# Patient Record
Sex: FEMALE | Race: BLACK OR AFRICAN AMERICAN | NOT HISPANIC OR LATINO | Employment: OTHER | URBAN - METROPOLITAN AREA
[De-identification: names, ages, dates, MRNs, and addresses within clinical notes are randomized per-mention and may not be internally consistent; named-entity substitution may affect disease eponyms.]

---

## 2017-04-26 PROBLEM — G30.1 LATE ONSET ALZHEIMER'S DISEASE WITHOUT BEHAVIORAL DISTURBANCE: Status: ACTIVE | Noted: 2017-04-26

## 2017-04-26 PROBLEM — F02.80 LATE ONSET ALZHEIMER'S DISEASE WITHOUT BEHAVIORAL DISTURBANCE: Status: ACTIVE | Noted: 2017-04-26

## 2017-04-26 PROBLEM — I12.9 HYPERTENSIVE KIDNEY DISEASE WITH CHRONIC KIDNEY DISEASE STAGE III: Status: ACTIVE | Noted: 2017-04-26

## 2017-04-26 PROBLEM — E66.09 NON MORBID OBESITY DUE TO EXCESS CALORIES: Status: ACTIVE | Noted: 2017-04-26

## 2017-04-26 PROBLEM — N18.30 HYPERTENSIVE KIDNEY DISEASE WITH CHRONIC KIDNEY DISEASE STAGE III: Status: ACTIVE | Noted: 2017-04-26

## 2017-10-09 PROBLEM — E11.69 DM TYPE 2 WITH DIABETIC MIXED HYPERLIPIDEMIA: Status: ACTIVE | Noted: 2017-10-09

## 2017-10-09 PROBLEM — E78.2 DM TYPE 2 WITH DIABETIC MIXED HYPERLIPIDEMIA: Status: ACTIVE | Noted: 2017-10-09

## 2018-03-19 PROBLEM — N18.30 TYPE 2 DIABETES MELLITUS WITH STAGE 3 CHRONIC KIDNEY DISEASE, WITH LONG-TERM CURRENT USE OF INSULIN: Status: ACTIVE | Noted: 2018-03-19

## 2018-03-19 PROBLEM — I10 HTN, GOAL BELOW 140/90: Status: ACTIVE | Noted: 2018-03-19

## 2018-03-19 PROBLEM — Z79.4 TYPE 2 DIABETES MELLITUS WITH STAGE 3 CHRONIC KIDNEY DISEASE, WITH LONG-TERM CURRENT USE OF INSULIN: Status: ACTIVE | Noted: 2018-03-19

## 2018-03-19 PROBLEM — E11.22 TYPE 2 DIABETES MELLITUS WITH STAGE 3 CHRONIC KIDNEY DISEASE, WITH LONG-TERM CURRENT USE OF INSULIN: Status: ACTIVE | Noted: 2018-03-19

## 2018-04-09 PROBLEM — H61.21 IMPACTED CERUMEN OF RIGHT EAR: Status: ACTIVE | Noted: 2018-04-09

## 2018-04-09 PROBLEM — H92.02 LEFT EAR PAIN: Status: ACTIVE | Noted: 2018-04-09

## 2018-04-09 PROBLEM — H91.92: Status: ACTIVE | Noted: 2018-04-09

## 2018-04-09 PROBLEM — H60.502 ACUTE OTITIS EXTERNA OF LEFT EAR: Status: ACTIVE | Noted: 2018-04-09

## 2018-09-26 PROBLEM — Z23 NEEDS FLU SHOT: Status: ACTIVE | Noted: 2018-09-26

## 2019-01-17 PROBLEM — G40.909 SEIZURE DISORDER: Status: ACTIVE | Noted: 2019-01-17

## 2019-02-21 PROBLEM — H91.93 BILATERAL HEARING LOSS: Status: ACTIVE | Noted: 2019-02-21

## 2019-03-13 PROBLEM — E66.09 NON MORBID OBESITY DUE TO EXCESS CALORIES: Status: RESOLVED | Noted: 2017-04-26 | Resolved: 2019-03-13

## 2023-04-05 ENCOUNTER — OFFICE VISIT (OUTPATIENT)
Dept: FAMILY MEDICINE | Facility: CLINIC | Age: 88
End: 2023-04-05
Payer: MEDICARE

## 2023-04-05 VITALS
TEMPERATURE: 99 F | HEART RATE: 58 BPM | BODY MASS INDEX: 33.63 KG/M2 | HEIGHT: 60 IN | SYSTOLIC BLOOD PRESSURE: 134 MMHG | WEIGHT: 171.31 LBS | DIASTOLIC BLOOD PRESSURE: 60 MMHG | OXYGEN SATURATION: 97 %

## 2023-04-05 DIAGNOSIS — G40.909 SEIZURE DISORDER: ICD-10-CM

## 2023-04-05 DIAGNOSIS — N18.30 TYPE 2 DIABETES MELLITUS WITH STAGE 3 CHRONIC KIDNEY DISEASE, WITH LONG-TERM CURRENT USE OF INSULIN: ICD-10-CM

## 2023-04-05 DIAGNOSIS — F01.50 VASCULAR DEMENTIA WITHOUT BEHAVIORAL DISTURBANCE: ICD-10-CM

## 2023-04-05 DIAGNOSIS — I10 ESSENTIAL HYPERTENSION, BENIGN: ICD-10-CM

## 2023-04-05 DIAGNOSIS — E11.22 TYPE 2 DIABETES MELLITUS WITH STAGE 3 CHRONIC KIDNEY DISEASE, WITH LONG-TERM CURRENT USE OF INSULIN: ICD-10-CM

## 2023-04-05 DIAGNOSIS — I10 HTN, GOAL BELOW 140/90: ICD-10-CM

## 2023-04-05 DIAGNOSIS — Z79.4 TYPE 2 DIABETES MELLITUS WITH STAGE 3 CHRONIC KIDNEY DISEASE, WITH LONG-TERM CURRENT USE OF INSULIN: ICD-10-CM

## 2023-04-05 PROCEDURE — 99999 PR PBB SHADOW E&M-NEW PATIENT-LVL III: ICD-10-PCS | Mod: PBBFAC,,, | Performed by: FAMILY MEDICINE

## 2023-04-05 PROCEDURE — 99205 PR OFFICE/OUTPT VISIT, NEW, LEVL V, 60-74 MIN: ICD-10-PCS | Mod: S$PBB,,, | Performed by: FAMILY MEDICINE

## 2023-04-05 PROCEDURE — 99205 OFFICE O/P NEW HI 60 MIN: CPT | Mod: S$PBB,,, | Performed by: FAMILY MEDICINE

## 2023-04-05 PROCEDURE — 99203 OFFICE O/P NEW LOW 30 MIN: CPT | Mod: PBBFAC,PO | Performed by: FAMILY MEDICINE

## 2023-04-05 PROCEDURE — 99999 PR PBB SHADOW E&M-NEW PATIENT-LVL III: CPT | Mod: PBBFAC,,, | Performed by: FAMILY MEDICINE

## 2023-04-05 RX ORDER — AMLODIPINE BESYLATE 10 MG/1
10 TABLET ORAL DAILY
Qty: 90 TABLET | Refills: 3 | Status: SHIPPED | OUTPATIENT
Start: 2023-04-05

## 2023-04-05 RX ORDER — DONEPEZIL HYDROCHLORIDE 5 MG/1
5 TABLET, FILM COATED ORAL DAILY
Qty: 90 TABLET | Refills: 3 | Status: SHIPPED | OUTPATIENT
Start: 2023-04-05

## 2023-04-05 RX ORDER — RIVAROXABAN 2.5 MG/1
2.5 TABLET, FILM COATED ORAL DAILY
Qty: 30 TABLET | Refills: 0 | Status: SHIPPED | OUTPATIENT
Start: 2023-04-05 | End: 2023-05-04

## 2023-04-05 RX ORDER — PRAVASTATIN SODIUM 40 MG/1
40 TABLET ORAL
COMMUNITY
Start: 2023-03-24 | End: 2023-04-05 | Stop reason: SDUPTHER

## 2023-04-05 RX ORDER — INSULIN LISPRO 100 [IU]/ML
INJECTION, SUSPENSION SUBCUTANEOUS
Qty: 30 ML | Refills: 6 | OUTPATIENT
Start: 2023-04-05 | End: 2023-08-07

## 2023-04-05 RX ORDER — CARBAMAZEPINE 200 MG/1
200 CAPSULE, EXTENDED RELEASE ORAL 2 TIMES DAILY
Qty: 180 CAPSULE | Refills: 4 | Status: SHIPPED | OUTPATIENT
Start: 2023-04-05

## 2023-04-05 RX ORDER — CARVEDILOL 3.12 MG/1
3.12 TABLET ORAL 2 TIMES DAILY
Qty: 180 TABLET | Refills: 3 | Status: SHIPPED | OUTPATIENT
Start: 2023-04-05 | End: 2024-04-04

## 2023-04-05 RX ORDER — VALSARTAN AND HYDROCHLOROTHIAZIDE 320; 25 MG/1; MG/1
1 TABLET, FILM COATED ORAL DAILY
Qty: 90 TABLET | Refills: 3 | OUTPATIENT
Start: 2023-04-05 | End: 2023-08-07

## 2023-04-05 RX ORDER — PEN NEEDLE, DIABETIC 32 GX 1/4"
NEEDLE, DISPOSABLE MISCELLANEOUS 2 TIMES DAILY
COMMUNITY
Start: 2023-02-05

## 2023-04-05 RX ORDER — PRAVASTATIN SODIUM 40 MG/1
40 TABLET ORAL NIGHTLY
Qty: 90 TABLET | Refills: 3 | Status: SHIPPED | OUTPATIENT
Start: 2023-04-05

## 2023-04-05 NOTE — PROGRESS NOTES
HISTORY OF PRESENT ILLNESS:  Jossie Morejon is a 89 y.o. female who presents to the clinic today for Establish Care  .       Is a new patient to us with complex medical care.  She is 89 years old and she was away and the state for her last 12 months getting care.  Since that time she has death of her younger start her which has really changed her overall ability to care of herself over last 6 months.  Her current caretaker who is mainly her granddaughter but has help from the rest of family states that over the last 6 months she has had a significant decline.  She is way more foot withdrawn she is more irritable and her memory loss is even worse.  After reviewing the chart it seems like she has vascular type dementia and seizure disorder.  She was taken off the Aricept were not sure why but she feels like she was doing much better with her memory on this.  To the best of her knowledge and from review of records we can find any reason where the Aricept was discontinued secondary to harmful reasons.  Now that she is here permanently they are looking to get her established and in care and they were concerned that she does not like to go outside for care.  They are open to all possible avenues of caring for their loved one.    Patient Active Problem List   Diagnosis    Vitamin D deficiency    Dyslipidemia    History of breast cancer    Diabetes mellitus with renal manifestations, controlled    Prerenal azotemia    Chronic kidney disease, stage III (moderate)    BMI 32.0-32.9,adult    Hypertensive kidney disease with chronic kidney disease stage III    Late onset Alzheimer's disease without behavioral disturbance    DM type 2 with diabetic mixed hyperlipidemia    Type 2 diabetes mellitus with stage 3 chronic kidney disease, with long-term current use of insulin    HTN, goal below 140/90    Hearing difficulty, left    Left ear pain    Impacted cerumen of right ear    Acute otitis externa of left ear    Needs flu shot     "Seizure disorder    Bilateral hearing loss           CARE TEAM:  Patient Care Team:  Sammy Woody MD as PCP - General (Family Medicine)         ROS  Per HPI  Memory issues noted      PHYSICAL EXAM:   /60   Pulse (!) 58   Temp 98.9 °F (37.2 °C) (Oral)   Ht 5' (1.524 m)   Wt 77.7 kg (171 lb 4.8 oz)   SpO2 97%   BMI 33.45 kg/m²   BP Readings from Last 5 Encounters:   04/05/23 134/60   03/13/19 (!) 155/72   02/21/19 (!) 163/75   01/17/19 (!) 161/83   09/26/18 136/80     Wt Readings from Last 5 Encounters:   04/05/23 77.7 kg (171 lb 4.8 oz)   03/13/19 78.2 kg (172 lb 6.4 oz)   02/21/19 77.4 kg (170 lb 9.6 oz)   01/17/19 79.4 kg (175 lb)   09/26/18 79.8 kg (176 lb)             She is alert.  Hearing is compromised  Dentition is poor.  Hygiene is good.  Abdominal obesity noted  Arthritic change in the joints of hands/elbow, knees.  Wide based gait noted.  Hard to keep her attention.  Obvious memory issues and hard for her to participate in standard conversation.      Seek old records  Reviewed epic records.     Medication List with Changes/Refills   New Medications    RIVAROXABAN (XARELTO) 2.5 MG TAB    Take 1 tablet (2.5 mg total) by mouth once daily.   Current Medications    BLOOD SUGAR DIAGNOSTIC STRP    1 strip by Misc.(Non-Drug; Combo Route) route 3 (three) times daily.    BLOOD-GLUCOSE METER MISC    1 Device by Misc.(Non-Drug; Combo Route) route 3 (three) times daily.    ERGOCALCIFEROL (VITAMIN D2) 50,000 UNIT CAP    Take 1 capsule (50,000 Units total) by mouth every 7 days.    INSULIN SYRINGE-NEEDLE U-100 (INS SYRINGE/NEEDLE 0.5 ML 27 G) 1/2 ML 27 GAUGE X 1/2" SYRG    1 each by Misc.(Non-Drug; Combo Route) route 2 (two) times daily.    UNIFINE PENTIPS 32 GAUGE X 1/4" NDLE    2 (two) times daily.   Changed and/or Refilled Medications    Modified Medication Previous Medication    AMLODIPINE (NORVASC) 10 MG TABLET amLODIPine (NORVASC) 10 MG tablet       Take 1 tablet (10 mg total) by mouth once daily.    " Take 1 tablet (10 mg total) by mouth once daily.    CARBAMAZEPINE (CARBATROL) 200 MG CM12 carBAMazepine (CARBATROL) 200 MG CM12       Take 1 capsule (200 mg total) by mouth 2 (two) times daily.    Take 1 capsule (200 mg total) by mouth 2 (two) times daily.    CARVEDILOL (COREG) 3.125 MG TABLET carvedilol (COREG) 3.125 MG tablet       Take 1 tablet (3.125 mg total) by mouth 2 (two) times daily.    Take 1 tablet (3.125 mg total) by mouth 2 (two) times daily.    DONEPEZIL (ARICEPT) 5 MG TABLET donepezil (ARICEPT) 5 MG tablet       Take 1 tablet (5 mg total) by mouth once daily.    TAKE ONE TABLET BY MOUTH EVERY DAY    HUMALOG MIX 75-25,U-100,INSULN 100 UNIT/ML (75-25) SUSP HUMALOG MIX 75-25,U-100,INSULN 100 unit/mL (75-25) Susp       30 units QAM, 15 units QPM daily    30 units QAM, 15 units QPM daily    PRAVASTATIN (PRAVACHOL) 40 MG TABLET pravastatin (PRAVACHOL) 40 MG tablet       Take 1 tablet (40 mg total) by mouth every evening.    Take 40 mg by mouth.    VALSARTAN-HYDROCHLOROTHIAZIDE (DIOVAN-HCT) 320-25 MG PER TABLET valsartan-hydrochlorothiazide (DIOVAN-HCT) 320-25 mg per tablet       Take 1 tablet by mouth once daily.    Take 1 tablet by mouth once daily.   Discontinued Medications    ATORVASTATIN (LIPITOR) 10 MG TABLET    Take 1 tablet (10 mg total) by mouth once daily.         ASSESSMENT AND PLAN:    Problem List Items Addressed This Visit       Type 2 diabetes mellitus with stage 3 chronic kidney disease, with long-term current use of insulin    Relevant Medications    HUMALOG MIX 75-25,U-100,INSULN 100 unit/mL (75-25) Susp    pravastatin (PRAVACHOL) 40 MG tablet    HTN, goal below 140/90    Relevant Medications    valsartan-hydrochlorothiazide (DIOVAN-HCT) 320-25 mg per tablet    carvediloL (COREG) 3.125 MG tablet    Seizure disorder    Relevant Medications    carBAMazepine (CARBATROL) 200 MG CM12     Other Visit Diagnoses       Essential hypertension        Relevant Medications    amLODIPine (NORVASC) 10  MG tablet    Vascular dementia without behavioral disturbance        Relevant Medications    donepeziL (ARICEPT) 5 MG tablet    rivaroxaban (XARELTO) 2.5 mg Tab          Potential medication side effects were discussed with the patient; let me know if any occur.      We had a prolonged discussion about these complex clinical issues and went over the various important aspects to consider. All questions were answered.    Spoke with granddaughter who is the caregiver at length about history and current concerns 25 minutes.    Reviewed prior chart from Dr. Ignacio for 20 minutes.    Seek the old records and coordinate from her out of state care over last 12 months, 15 minutes.    Coordinate care, set up family meeting for possible palliative home care, refills and charting for an additional 20 minutes.  Future Appointments   Date Time Provider Department Center   5/9/2023  4:00 PM Sammy Woody MD [facility] [place]     1 month and go over the directives and possible palliative care  No follow-ups on file. or sooner as needed.

## 2023-04-26 ENCOUNTER — HOSPITAL ENCOUNTER (EMERGENCY)
Facility: HOSPITAL | Age: 88
Discharge: HOME | End: 2023-04-26
Attending: EMERGENCY MEDICINE
Payer: MEDICARE

## 2023-04-26 VITALS
DIASTOLIC BLOOD PRESSURE: 90 MMHG | OXYGEN SATURATION: 98 % | HEART RATE: 75 BPM | WEIGHT: 165 LBS | BODY MASS INDEX: 27.49 KG/M2 | TEMPERATURE: 99 F | HEIGHT: 65 IN | RESPIRATION RATE: 15 BRPM | SYSTOLIC BLOOD PRESSURE: 180 MMHG

## 2023-04-26 DIAGNOSIS — H61.23 BILATERAL IMPACTED CERUMEN: ICD-10-CM

## 2023-04-26 DIAGNOSIS — R51.9 ACUTE NONINTRACTABLE HEADACHE, UNSPECIFIED HEADACHE TYPE: ICD-10-CM

## 2023-04-26 DIAGNOSIS — S16.1XXA NECK MUSCLE STRAIN, INITIAL ENCOUNTER: Primary | ICD-10-CM

## 2023-04-26 LAB — POCT GLUCOSE: 147 MG/DL (ref 70–110)

## 2023-04-26 PROCEDURE — 82962 GLUCOSE BLOOD TEST: CPT

## 2023-04-26 PROCEDURE — 25000003 PHARM REV CODE 250

## 2023-04-26 PROCEDURE — 69209 REMOVE IMPACTED EAR WAX UNI: CPT | Mod: 50

## 2023-04-26 PROCEDURE — 99284 EMERGENCY DEPT VISIT MOD MDM: CPT | Mod: 25

## 2023-04-26 RX ORDER — ACETAMINOPHEN 500 MG
500 TABLET ORAL EVERY 6 HOURS PRN
Qty: 20 TABLET | Refills: 0 | Status: SHIPPED | OUTPATIENT
Start: 2023-04-26 | End: 2023-12-17

## 2023-04-26 RX ORDER — LIDOCAINE 50 MG/G
1 PATCH TOPICAL DAILY
Qty: 15 PATCH | Refills: 0 | Status: SHIPPED | OUTPATIENT
Start: 2023-04-26

## 2023-04-26 RX ORDER — DICLOFENAC SODIUM 10 MG/G
2 GEL TOPICAL 4 TIMES DAILY PRN
Qty: 50 G | Refills: 0 | Status: SHIPPED | OUTPATIENT
Start: 2023-04-26

## 2023-04-26 RX ORDER — TIZANIDINE 2 MG/1
4 TABLET ORAL EVERY 6 HOURS PRN
Qty: 20 TABLET | Refills: 0 | OUTPATIENT
Start: 2023-04-26 | End: 2023-08-07

## 2023-04-26 RX ADMIN — CARBAMIDE PEROXIDE 6.5% 5 DROP: 6.5 LIQUID AURICULAR (OTIC) at 01:04

## 2023-04-26 NOTE — ED TRIAGE NOTES
Pt presents to ED via POV with daughter at bedside with c/o LEFT sided neck pain. States took something to help her sleep last night and when she woke up this morning, she felt like she could not move. She called for her granddaughter who helped sit her up in bed. Since, she had has neck pain., especially with movement. Pt also reports LEFT ear pain and fullness x several days. No tx used.

## 2023-04-26 NOTE — DISCHARGE INSTRUCTIONS

## 2023-04-26 NOTE — Clinical Note
Jossie Ornelas accompanied their mother to the emergency department on 4/26/2023. They may return to work on 04/27/2023.      If you have any questions or concerns, please don't hesitate to call.      Alayna Holdsworth, PA-C

## 2023-04-26 NOTE — ED PROVIDER NOTES
Encounter Date: 4/26/2023    SCRIBE #1 NOTE: ITrena, am scribing for, and in the presence of,  Alayna Holdsworth, PA-C. I have scribed the following portions of the note - Other sections scribed: HPI and ROS.     History     Chief Complaint   Patient presents with    Neck Pain    Headache     Pt c/o headache and neck pain x 1 week. Pt's daughter states pt has been c/o left ear fullness and may require cleaning. Pt states she took tylenol 2 night ago which put her to sleep. Pt denies cp, sob, n/v/d, AMS, numbness/tingling.      Jossie Morejon is a 89 y.o. female, with a past medical history of alzheimer's, DM, HLD, HTN, stroke, and seizures, who presents to the ED with intermittent left-sided neck pain and headache that began 1 week ago but exacerbated this morning. Patient states the pain was so bad that she could hardly get up. Patient has an associated symptom of frontalis headache that is intermittent and currently a 0/10. Additional history obtained form daughter states the patient has been complaining of left ear pain.  Patient's daughter states that she is a history of cerumen impaction and usually has to get it removed.  Patient endorse tylenol pm with mild relief. No other exacerbating or alleviating factors. Patient denies fever, chest pain, nausea, emesis, or other associated symptoms. Daughter denies the patient experiencing recent trauma or sick contacts. No known allergies.  Per daughter patient is at mental baseline.      The history is provided by the patient and a relative. No  was used.   Review of patient's allergies indicates:  No Known Allergies  Past Medical History:   Diagnosis Date    Alzheimer disease     Breast cancer     CKD (chronic kidney disease)     Diabetes mellitus type II     Dyslipidemia     Dyslipidemia     Essential hypertension, benign     History of CVA (cerebrovascular accident)     Hypercholesteremia     Microalbuminuria     Mild anemia      Seizure disorder      No past surgical history on file.  Family History   Problem Relation Age of Onset    Diabetes Mother      Social History     Tobacco Use    Smoking status: Never    Smokeless tobacco: Never   Substance Use Topics    Alcohol use: No    Drug use: No     Review of Systems   Constitutional:  Negative for chills, diaphoresis and fever.   HENT:  Positive for ear pain (left). Negative for congestion, rhinorrhea and sore throat.    Eyes:  Negative for visual disturbance.   Respiratory:  Negative for cough and shortness of breath.    Cardiovascular:  Negative for chest pain.   Gastrointestinal:  Negative for abdominal pain, constipation, diarrhea, nausea and vomiting.   Genitourinary:  Negative for decreased urine volume, dysuria, frequency and urgency.   Musculoskeletal:  Positive for neck pain (left).   Skin:  Negative for color change, rash and wound.   Neurological:  Positive for headaches (frontalis). Negative for dizziness, syncope, speech difficulty, weakness, light-headedness and numbness.   Psychiatric/Behavioral:  Negative for behavioral problems and confusion.      Physical Exam     Initial Vitals [04/26/23 1209]   BP Pulse Resp Temp SpO2   (!) 179/74 76 18 99.2 °F (37.3 °C) 99 %      MAP       --         Physical Exam    Nursing note and vitals reviewed.  Constitutional: She appears well-developed and well-nourished. She is not diaphoretic. She is active. She does not appear ill. No distress.   HENT:   Head: Normocephalic and atraumatic.   Right Ear: External ear normal.   Left Ear: External ear normal.   Nose: Nose normal.   Bilateral cerumen impaction   Eyes: Conjunctivae, EOM and lids are normal. Pupils are equal, round, and reactive to light. Right eye exhibits no discharge. Left eye exhibits no discharge. Right eye exhibits normal extraocular motion and no nystagmus. Left eye exhibits normal extraocular motion and no nystagmus.   Neck: Phonation normal. Neck supple.   Normal range of  motion.   Full passive range of motion without pain.     Cardiovascular:  Normal rate and regular rhythm.           Pulmonary/Chest: Effort normal and breath sounds normal. No respiratory distress.   Abdominal: She exhibits no distension.   Musculoskeletal:         General: Normal range of motion.      Cervical back: Full passive range of motion without pain, normal range of motion and neck supple. No edema, erythema or rigidity. No spinous process tenderness or muscular tenderness. Normal range of motion.     Neurological: She is alert and oriented to person, place, and time. She has normal strength. No cranial nerve deficit or sensory deficit. GCS eye subscore is 4. GCS verbal subscore is 5. GCS motor subscore is 6.   Skin: Skin is dry. Capillary refill takes less than 2 seconds.       ED Course   Procedures  Labs Reviewed   POCT GLUCOSE - Abnormal; Notable for the following components:       Result Value    POCT Glucose 147 (*)     All other components within normal limits   POCT GLUCOSE MONITORING CONTINUOUS          Imaging Results              CT Head Without Contrast (Final result)  Result time 04/26/23 13:38:56      Final result by Tamara Romeo MD (04/26/23 13:38:56)                   Impression:      1. No acute intracranial abnormality.  2. No fracture or subluxation of the cervical spine.      Electronically signed by: Tamara Romeo  Date:    04/26/2023  Time:    13:38               Narrative:    EXAMINATION:  CT HEAD WITHOUT CONTRAST; CT CERVICAL SPINE WITHOUT CONTRAST    CLINICAL HISTORY:  Headache, new or worsening (Age >= 50y);; Neck trauma (Age >= 65y);    TECHNIQUE:  Low dose axial images were obtained through the head and cervical spine.  Coronal and sagittal reformations were also performed. Contrast was not administered.    COMPARISON:  None.    FINDINGS:  Head:    Blood: No acute intracranial hemorrhage.    Parenchyma: No definite loss of gray-white differentiation to suggest acute or  subacute transcortical infarct. Remote left lacunar infarct    Ventricles/Extra-axial spaces: No abnormal extra-axial fluid collection. Basal cisterns are patent.    Vessels: The internal carotid arteries are calcified bilaterally.    Orbits: Unremarkable.    Scalp: Unremarkable.    Skull: There are no depressed skull fractures or destructive bone lesions.    Sinuses and mastoids: Polypoid circumferential mucosal thickening in the right maxillary sinus.    Other findings: None    Cervical spine:    Fractures: No acute fractures    Alignment: There is no significant vertebral subluxation  Atlanto-axial and atlanto-occipital joints: Atlanto-axial and atlanto-occipital intervals are not widened.  Facet joints: There is no traumatic facet joint widening.  Vertebral bodies: Normal  Discs: Multilevel degenerative disc osteophyte complexes are present.  There is also bilateral facet and ligamentum flavum hypertrophy.  Spinal canal and foraminal narrowing: Although CT does not optimally evaluate the soft tissue contents of the spinal canal and foramina, no critical stenosis is suggested.  Paraspinal soft tissues: Unremarkable.    Upper Lungs:Clear                                       CT Cervical Spine Without Contrast (Final result)  Result time 04/26/23 13:38:56      Final result by Tamara Romeo MD (04/26/23 13:38:56)                   Impression:      1. No acute intracranial abnormality.  2. No fracture or subluxation of the cervical spine.      Electronically signed by: Tamara Romeo  Date:    04/26/2023  Time:    13:38               Narrative:    EXAMINATION:  CT HEAD WITHOUT CONTRAST; CT CERVICAL SPINE WITHOUT CONTRAST    CLINICAL HISTORY:  Headache, new or worsening (Age >= 50y);; Neck trauma (Age >= 65y);    TECHNIQUE:  Low dose axial images were obtained through the head and cervical spine.  Coronal and sagittal reformations were also performed. Contrast was not  administered.    COMPARISON:  None.    FINDINGS:  Head:    Blood: No acute intracranial hemorrhage.    Parenchyma: No definite loss of gray-white differentiation to suggest acute or subacute transcortical infarct. Remote left lacunar infarct    Ventricles/Extra-axial spaces: No abnormal extra-axial fluid collection. Basal cisterns are patent.    Vessels: The internal carotid arteries are calcified bilaterally.    Orbits: Unremarkable.    Scalp: Unremarkable.    Skull: There are no depressed skull fractures or destructive bone lesions.    Sinuses and mastoids: Polypoid circumferential mucosal thickening in the right maxillary sinus.    Other findings: None    Cervical spine:    Fractures: No acute fractures    Alignment: There is no significant vertebral subluxation  Atlanto-axial and atlanto-occipital joints: Atlanto-axial and atlanto-occipital intervals are not widened.  Facet joints: There is no traumatic facet joint widening.  Vertebral bodies: Normal  Discs: Multilevel degenerative disc osteophyte complexes are present.  There is also bilateral facet and ligamentum flavum hypertrophy.  Spinal canal and foraminal narrowing: Although CT does not optimally evaluate the soft tissue contents of the spinal canal and foramina, no critical stenosis is suggested.  Paraspinal soft tissues: Unremarkable.    Upper Lungs:Clear                                       Medications   carbamide peroxide 6.5 % otic solution 5 drop (5 drops Left Ear Given 4/26/23 1328)   carbamide peroxide 6.5 % otic solution 5 drop (5 drops Right Ear Given 4/26/23 1328)     Medical Decision Making:   Initial Assessment:   89 y.o. female, with a past medical history of alzheimer's, DM, HLD, HTN, stroke, and seizures, who presents to the ED with intermittent left-sided neck pain and headache.  Patient's chart and medical history reviewed.  Differential Diagnosis:   SAH  Epidural hematoma  Subdural  hematoma  CVA  TIA  Fracture  Dislocation  Contusion  Sprain  Strain  Clinical Tests:   Lab Tests: Ordered and Reviewed  Radiological Study: Reviewed and Ordered  ED Management:  Patient's vitals reviewed.  She is afebrile, no respiratory distress, nontoxic-appearing in the ED. patient's neuro exam was normal.  Patient's physical exam is unremarkable besides bilateral cerumen impaction.  Patient denied pain medication.  Point of care glucose is 147.  Bilateral cerumen impaction performed by nurse, patient states she is feeling a lot better.  CT head and neck showed no acute intracranial abnormality. No fracture or subluxation of the cervical spine.  Discussed with patient started this could possibly be due to her sleeping wrong having a neck strain on left side.  Patient will be sent home with Tylenol, lidocaine patches, tizanidine, and Voltaren gel for symptomatic control.  Discussed with her that she must leave patch on for 12 hours then leave off for 12 hours, she verbalized understanding.  Instructed patient to rest, ice, and heat.  Patient will follow-up with her PCP. Patient agrees with this plan. Discussed with her strict return precautions, she verbalized understanding. Patient is stable for discharge.   \          Scribe Attestation:   Scribe #1: I performed the above scribed service and the documentation accurately describes the services I performed. I attest to the accuracy of the note.                   Clinical Impression:   Final diagnoses:  [S16.1XXA] Neck muscle strain, initial encounter - left (Primary)  [H61.23] Bilateral impacted cerumen  [R51.9] Acute nonintractable headache, unspecified headache type        ED Disposition Condition    Discharge Stable        I, Alayna Holdsworth,PA-C, personally performed the services described in this documentation. All medical record entries made by the scribe were at my direction and in my presence. I have reviewed the chart and agree that the record reflects  my personal performance and is accurate and complete.    ED Prescriptions       Medication Sig Dispense Start Date End Date Auth. Provider    acetaminophen (TYLENOL) 500 MG tablet Take 1 tablet (500 mg total) by mouth every 6 (six) hours as needed for Temperature greater than or Pain. 20 tablet 4/26/2023 -- Alayna Holdsworth, PA-C    LIDOcaine (LIDODERM) 5 % Place 1 patch onto the skin once daily. Remove & Discard patch within 12 hours then leave off for 12 hours 15 patch 4/26/2023 -- Alayna Holdsworth, PA-C    tiZANidine (ZANAFLEX) 2 MG tablet Take 2 tablets (4 mg total) by mouth every 6 (six) hours as needed (Pain). 20 tablet 4/26/2023 -- Alayna Holdsworth, PA-C    diclofenac sodium (VOLTAREN) 1 % Gel Apply 2 g topically 4 (four) times daily as needed (Pain). 50 g 4/26/2023 -- Alayna Holdsworth, PA-C          Follow-up Information       Follow up With Specialties Details Why Contact Info    Sammy Woody MD Family Medicine   [address] [facility]  [place] [id number]  [phone]               Alayna Holdsworth, PA-C  04/26/23 3773

## 2023-08-06 ENCOUNTER — HOSPITAL ENCOUNTER (OUTPATIENT)
Facility: HOSPITAL | Age: 88
Discharge: HOME | End: 2023-08-07
Attending: EMERGENCY MEDICINE | Admitting: HOSPITALIST
Payer: MEDICARE

## 2023-08-06 DIAGNOSIS — U07.1 COVID: Primary | ICD-10-CM

## 2023-08-06 DIAGNOSIS — R79.89 ELEVATED TROPONIN: ICD-10-CM

## 2023-08-06 DIAGNOSIS — R55 NEAR SYNCOPE: ICD-10-CM

## 2023-08-06 DIAGNOSIS — E16.2 HYPOGLYCEMIA: ICD-10-CM

## 2023-08-06 DIAGNOSIS — N17.9 AKI (ACUTE KIDNEY INJURY): ICD-10-CM

## 2023-08-06 PROBLEM — N18.30 ACUTE RENAL FAILURE SUPERIMPOSED ON STAGE 3 CHRONIC KIDNEY DISEASE: Status: ACTIVE | Noted: 2023-08-06

## 2023-08-06 LAB
ALBUMIN SERPL BCP-MCNC: 3.6 G/DL (ref 3.5–5.2)
ALP SERPL-CCNC: 145 U/L (ref 55–135)
ALT SERPL W/O P-5'-P-CCNC: 10 U/L (ref 10–44)
AMORPH CRY URNS QL MICRO: ABNORMAL
ANION GAP SERPL CALC-SCNC: 9 MMOL/L (ref 8–16)
AST SERPL-CCNC: 13 U/L (ref 10–40)
BACTERIA #/AREA URNS HPF: ABNORMAL /HPF
BASOPHILS # BLD AUTO: 0.01 K/UL (ref 0–0.2)
BASOPHILS NFR BLD: 0.2 % (ref 0–1.9)
BILIRUB SERPL-MCNC: 0.2 MG/DL (ref 0.1–1)
BILIRUB UR QL STRIP: NEGATIVE
BNP SERPL-MCNC: 50 PG/ML (ref 0–99)
BUN SERPL-MCNC: 40 MG/DL (ref 8–23)
CALCIUM SERPL-MCNC: 8.9 MG/DL (ref 8.7–10.5)
CARBAMAZEPINE SERPL-MCNC: 3.7 UG/ML (ref 4–12)
CHLORIDE SERPL-SCNC: 104 MMOL/L (ref 95–110)
CK SERPL-CCNC: 202 U/L (ref 20–180)
CLARITY UR: ABNORMAL
CO2 SERPL-SCNC: 23 MMOL/L (ref 23–29)
COLOR UR: ABNORMAL
CREAT SERPL-MCNC: 2.5 MG/DL (ref 0.5–1.4)
CTP QC/QA: YES
CTP QC/QA: YES
DIFFERENTIAL METHOD: ABNORMAL
EOSINOPHIL # BLD AUTO: 0.1 K/UL (ref 0–0.5)
EOSINOPHIL NFR BLD: 1.1 % (ref 0–8)
ERYTHROCYTE [DISTWIDTH] IN BLOOD BY AUTOMATED COUNT: 17.3 % (ref 11.5–14.5)
EST. GFR  (NO RACE VARIABLE): 18 ML/MIN/1.73 M^2
GLUCOSE SERPL-MCNC: 126 MG/DL (ref 70–110)
GLUCOSE UR QL STRIP: NEGATIVE
HCT VFR BLD AUTO: 28.5 % (ref 37–48.5)
HGB BLD-MCNC: 8.8 G/DL (ref 12–16)
HGB UR QL STRIP: ABNORMAL
HYALINE CASTS #/AREA URNS LPF: 0 /LPF
IMM GRANULOCYTES # BLD AUTO: 0.07 K/UL (ref 0–0.04)
IMM GRANULOCYTES NFR BLD AUTO: 1.1 % (ref 0–0.5)
KETONES UR QL STRIP: NEGATIVE
LACTATE SERPL-SCNC: 1.7 MMOL/L (ref 0.5–2.2)
LEUKOCYTE ESTERASE UR QL STRIP: ABNORMAL
LYMPHOCYTES # BLD AUTO: 1.1 K/UL (ref 1–4.8)
LYMPHOCYTES NFR BLD: 17.7 % (ref 18–48)
MAGNESIUM SERPL-MCNC: 2.4 MG/DL (ref 1.6–2.6)
MCH RBC QN AUTO: 24 PG (ref 27–31)
MCHC RBC AUTO-ENTMCNC: 30.9 G/DL (ref 32–36)
MCV RBC AUTO: 78 FL (ref 82–98)
MICROSCOPIC COMMENT: ABNORMAL
MONOCYTES # BLD AUTO: 0.6 K/UL (ref 0.3–1)
MONOCYTES NFR BLD: 9.2 % (ref 4–15)
NEUTROPHILS # BLD AUTO: 4.5 K/UL (ref 1.8–7.7)
NEUTROPHILS NFR BLD: 70.7 % (ref 38–73)
NITRITE UR QL STRIP: NEGATIVE
NRBC BLD-RTO: 0 /100 WBC
PH UR STRIP: 6 [PH] (ref 5–8)
PLATELET # BLD AUTO: 202 K/UL (ref 150–450)
PMV BLD AUTO: 9.8 FL (ref 9.2–12.9)
POC MOLECULAR INFLUENZA A AGN: NEGATIVE
POC MOLECULAR INFLUENZA B AGN: NEGATIVE
POCT GLUCOSE: 124 MG/DL (ref 70–110)
POCT GLUCOSE: 142 MG/DL (ref 70–110)
POCT GLUCOSE: 168 MG/DL (ref 70–110)
POCT GLUCOSE: 211 MG/DL (ref 70–110)
POCT GLUCOSE: 94 MG/DL (ref 70–110)
POTASSIUM SERPL-SCNC: 3.3 MMOL/L (ref 3.5–5.1)
PROT SERPL-MCNC: 7 G/DL (ref 6–8.4)
PROT UR QL STRIP: ABNORMAL
RBC # BLD AUTO: 3.66 M/UL (ref 4–5.4)
RBC #/AREA URNS HPF: 6 /HPF (ref 0–4)
SARS-COV-2 RDRP RESP QL NAA+PROBE: POSITIVE
SODIUM SERPL-SCNC: 136 MMOL/L (ref 136–145)
SP GR UR STRIP: 1.01 (ref 1–1.03)
SQUAMOUS #/AREA URNS HPF: 9 /HPF
TROPONIN I SERPL DL<=0.01 NG/ML-MCNC: 0.04 NG/ML (ref 0–0.03)
TROPONIN I SERPL DL<=0.01 NG/ML-MCNC: 0.08 NG/ML (ref 0–0.03)
TSH SERPL DL<=0.005 MIU/L-ACNC: 1.19 UIU/ML (ref 0.4–4)
URN SPEC COLLECT METH UR: ABNORMAL
UROBILINOGEN UR STRIP-ACNC: NEGATIVE EU/DL
WBC # BLD AUTO: 6.38 K/UL (ref 3.9–12.7)
WBC #/AREA URNS HPF: 4 /HPF (ref 0–5)

## 2023-08-06 PROCEDURE — 93005 ELECTROCARDIOGRAM TRACING: CPT

## 2023-08-06 PROCEDURE — 83735 ASSAY OF MAGNESIUM: CPT | Performed by: EMERGENCY MEDICINE

## 2023-08-06 PROCEDURE — 82962 GLUCOSE BLOOD TEST: CPT | Mod: 91

## 2023-08-06 PROCEDURE — 82550 ASSAY OF CK (CPK): CPT | Performed by: EMERGENCY MEDICINE

## 2023-08-06 PROCEDURE — 80053 COMPREHEN METABOLIC PANEL: CPT | Performed by: EMERGENCY MEDICINE

## 2023-08-06 PROCEDURE — 84484 ASSAY OF TROPONIN QUANT: CPT | Mod: 91 | Performed by: PHYSICIAN ASSISTANT

## 2023-08-06 PROCEDURE — 80156 ASSAY CARBAMAZEPINE TOTAL: CPT | Performed by: EMERGENCY MEDICINE

## 2023-08-06 PROCEDURE — 87635 SARS-COV-2 COVID-19 AMP PRB: CPT | Performed by: EMERGENCY MEDICINE

## 2023-08-06 PROCEDURE — 85025 COMPLETE CBC W/AUTO DIFF WBC: CPT | Performed by: EMERGENCY MEDICINE

## 2023-08-06 PROCEDURE — 93010 EKG 12-LEAD: ICD-10-PCS | Mod: ,,, | Performed by: INTERNAL MEDICINE

## 2023-08-06 PROCEDURE — 83880 ASSAY OF NATRIURETIC PEPTIDE: CPT | Performed by: EMERGENCY MEDICINE

## 2023-08-06 PROCEDURE — 81000 URINALYSIS NONAUTO W/SCOPE: CPT | Performed by: EMERGENCY MEDICINE

## 2023-08-06 PROCEDURE — 82962 GLUCOSE BLOOD TEST: CPT

## 2023-08-06 PROCEDURE — 93010 ELECTROCARDIOGRAM REPORT: CPT | Mod: ,,, | Performed by: INTERNAL MEDICINE

## 2023-08-06 PROCEDURE — 63600175 PHARM REV CODE 636 W HCPCS: Performed by: EMERGENCY MEDICINE

## 2023-08-06 PROCEDURE — 99285 EMERGENCY DEPT VISIT HI MDM: CPT | Mod: 25

## 2023-08-06 PROCEDURE — 83605 ASSAY OF LACTIC ACID: CPT | Performed by: EMERGENCY MEDICINE

## 2023-08-06 PROCEDURE — 96361 HYDRATE IV INFUSION ADD-ON: CPT

## 2023-08-06 PROCEDURE — 96374 THER/PROPH/DIAG INJ IV PUSH: CPT

## 2023-08-06 PROCEDURE — G0378 HOSPITAL OBSERVATION PER HR: HCPCS

## 2023-08-06 PROCEDURE — 84484 ASSAY OF TROPONIN QUANT: CPT | Performed by: EMERGENCY MEDICINE

## 2023-08-06 PROCEDURE — 83036 HEMOGLOBIN GLYCOSYLATED A1C: CPT | Performed by: PHYSICIAN ASSISTANT

## 2023-08-06 PROCEDURE — 25000003 PHARM REV CODE 250: Performed by: PHYSICIAN ASSISTANT

## 2023-08-06 PROCEDURE — 84443 ASSAY THYROID STIM HORMONE: CPT | Performed by: EMERGENCY MEDICINE

## 2023-08-06 RX ORDER — ASPIRIN 325 MG
325 TABLET ORAL
Status: DISPENSED | OUTPATIENT
Start: 2023-08-06 | End: 2023-08-07

## 2023-08-06 RX ORDER — AMLODIPINE BESYLATE 5 MG/1
10 TABLET ORAL DAILY
Status: DISCONTINUED | OUTPATIENT
Start: 2023-08-07 | End: 2023-08-07 | Stop reason: HOSPADM

## 2023-08-06 RX ORDER — GLUCAGON 1 MG
1 KIT INJECTION
Status: DISCONTINUED | OUTPATIENT
Start: 2023-08-06 | End: 2023-08-06

## 2023-08-06 RX ORDER — ASCORBIC ACID 500 MG
500 TABLET ORAL 2 TIMES DAILY
Status: DISCONTINUED | OUTPATIENT
Start: 2023-08-06 | End: 2023-08-07 | Stop reason: HOSPADM

## 2023-08-06 RX ORDER — IBUPROFEN 200 MG
24 TABLET ORAL
Status: DISCONTINUED | OUTPATIENT
Start: 2023-08-06 | End: 2023-08-06

## 2023-08-06 RX ORDER — PRAVASTATIN SODIUM 40 MG/1
40 TABLET ORAL NIGHTLY
Status: DISCONTINUED | OUTPATIENT
Start: 2023-08-06 | End: 2023-08-07 | Stop reason: HOSPADM

## 2023-08-06 RX ORDER — CARVEDILOL 3.12 MG/1
3.12 TABLET ORAL 2 TIMES DAILY
Status: DISCONTINUED | OUTPATIENT
Start: 2023-08-06 | End: 2023-08-07

## 2023-08-06 RX ORDER — SODIUM CHLORIDE 9 MG/ML
INJECTION, SOLUTION INTRAVENOUS CONTINUOUS
Status: DISCONTINUED | OUTPATIENT
Start: 2023-08-06 | End: 2023-08-07 | Stop reason: HOSPADM

## 2023-08-06 RX ORDER — GLUCAGON 1 MG
1 KIT INJECTION
Status: DISCONTINUED | OUTPATIENT
Start: 2023-08-06 | End: 2023-08-07 | Stop reason: HOSPADM

## 2023-08-06 RX ORDER — DONEPEZIL HYDROCHLORIDE 5 MG/1
5 TABLET, FILM COATED ORAL DAILY
Status: DISCONTINUED | OUTPATIENT
Start: 2023-08-07 | End: 2023-08-07 | Stop reason: HOSPADM

## 2023-08-06 RX ORDER — IBUPROFEN 200 MG
16 TABLET ORAL
Status: DISCONTINUED | OUTPATIENT
Start: 2023-08-06 | End: 2023-08-06

## 2023-08-06 RX ORDER — ACETAMINOPHEN 500 MG
500 TABLET ORAL EVERY 6 HOURS PRN
Status: DISCONTINUED | OUTPATIENT
Start: 2023-08-06 | End: 2023-08-07 | Stop reason: HOSPADM

## 2023-08-06 RX ORDER — DEXTROSE MONOHYDRATE AND SODIUM CHLORIDE 5; .9 G/100ML; G/100ML
INJECTION, SOLUTION INTRAVENOUS CONTINUOUS
Status: DISCONTINUED | OUTPATIENT
Start: 2023-08-06 | End: 2023-08-06

## 2023-08-06 RX ORDER — AMOXICILLIN 250 MG
1 CAPSULE ORAL DAILY PRN
Status: DISCONTINUED | OUTPATIENT
Start: 2023-08-06 | End: 2023-08-07 | Stop reason: HOSPADM

## 2023-08-06 RX ORDER — CARBAMAZEPINE 100 MG/1
200 TABLET, EXTENDED RELEASE ORAL 2 TIMES DAILY
Status: DISCONTINUED | OUTPATIENT
Start: 2023-08-07 | End: 2023-08-07 | Stop reason: HOSPADM

## 2023-08-06 RX ORDER — TALC
6 POWDER (GRAM) TOPICAL NIGHTLY PRN
Status: DISCONTINUED | OUTPATIENT
Start: 2023-08-06 | End: 2023-08-07 | Stop reason: HOSPADM

## 2023-08-06 RX ORDER — SODIUM CHLORIDE 0.9 % (FLUSH) 0.9 %
10 SYRINGE (ML) INJECTION
Status: DISCONTINUED | OUTPATIENT
Start: 2023-08-06 | End: 2023-08-07 | Stop reason: HOSPADM

## 2023-08-06 RX ORDER — IBUPROFEN 200 MG
16 TABLET ORAL
Status: DISCONTINUED | OUTPATIENT
Start: 2023-08-06 | End: 2023-08-07 | Stop reason: HOSPADM

## 2023-08-06 RX ORDER — IBUPROFEN 200 MG
24 TABLET ORAL
Status: DISCONTINUED | OUTPATIENT
Start: 2023-08-06 | End: 2023-08-07 | Stop reason: HOSPADM

## 2023-08-06 RX ADMIN — SODIUM CHLORIDE, POTASSIUM CHLORIDE, SODIUM LACTATE AND CALCIUM CHLORIDE 1000 ML: 600; 310; 30; 20 INJECTION, SOLUTION INTRAVENOUS at 04:08

## 2023-08-06 RX ADMIN — SODIUM CHLORIDE: 9 INJECTION, SOLUTION INTRAVENOUS at 09:08

## 2023-08-06 NOTE — ED PROVIDER NOTES
Encounter Date: 8/6/2023    SCRIBE #1 NOTE: I, ROBIN CRAWFORD, am scribing for, and in the presence of,  Denita Maria MD. I have scribed the following portions of the note - Other sections scribed: HPI, ROS, PE, MDM.       History     Chief Complaint   Patient presents with    near syncope     PT to EMS triage for near syncope. Per EMS, daughter states pt was having seizure like activity while walking from car to get inside after Urgent Care visit for +covid. PMHX of dementia and only oriented to self at baseline. EMS reports upon arrival pt was at baseline with a glucose of 48. 25g of Dextrose was given with EMS and 200 cc of D10, repeat cbg was 280. Upon arrival . VSSDaryl Morejon is a 89 y.o. female, with a PMHx of Alzheimer disease, breast cancer, CKD, DM type 2, dyslipidemia, essential HTN, Hx of CVA, hypercholesteremia, microalbuminuria, mild anemia, and seizure disorder, who presents to the ED via EMS with near syncope. Patient reports that she is not in any pain. Additional history obtained from independent historian, patient's granddaughter, who states that the patient was walking back from a vehicle into the house when she started having full body shaking and fell onto her mother. She further states that her mother put the patient down to the ground and her eyes rolled back and got stiff. She reports that the patient would talk or shake her head during the episode and could have had a seizure. She further reports that the patient was diagnosed with COVID-19 after coming into sick contact with family members in [place] the summer. She notes that the patient had been sleeping more than usual and eating less. No other exacerbating or alleviating factors. Denies any CP, dizziness, LOC, or other associated symptoms. Endorses compliance with daily medications. Denies any compliance with Xarelto. Patient denies any EtOH, tobacco, or illicit drug use. Patient has NKDA.     Additional history  obtained from independent historian, EMS personnel, who states that patient had a low BP of 90 systolic with CBG 48. They administered 25 g of Dextrose and 200 cc of D10 with patient's repeat . Upon arrival, patient had .    The history is provided by the patient, the EMS personnel and a relative. No  was used.     Review of patient's allergies indicates:  No Known Allergies  Past Medical History:   Diagnosis Date    Alzheimer disease     Breast cancer     CKD (chronic kidney disease)     Diabetes mellitus type II     Dyslipidemia     Dyslipidemia     Essential hypertension, benign     History of CVA (cerebrovascular accident)     Hypercholesteremia     Microalbuminuria     Mild anemia     Seizure disorder      No past surgical history on file.  Family History   Problem Relation Age of Onset    Diabetes Mother      Social History     Tobacco Use    Smoking status: Never    Smokeless tobacco: Never   Substance Use Topics    Alcohol use: No    Drug use: No     Review of Systems   Constitutional:  Positive for appetite change and fatigue. Negative for chills, diaphoresis and fever.   Eyes:  Negative for photophobia and visual disturbance.   Respiratory:  Negative for cough and shortness of breath.    Cardiovascular:  Negative for chest pain and leg swelling.   Gastrointestinal:  Negative for abdominal pain, blood in stool, constipation, diarrhea, nausea and vomiting.   Genitourinary:  Negative for dysuria, flank pain, frequency, hematuria and urgency.   Musculoskeletal:  Negative for neck pain and neck stiffness.   Skin:  Negative for rash and wound.   Neurological:  Positive for tremors and syncope. Negative for dizziness, weakness, light-headedness, numbness and headaches.   Psychiatric/Behavioral:  Negative for confusion and suicidal ideas.    All other systems reviewed and are negative.      Physical Exam     Initial Vitals [08/06/23 1524]   BP Pulse Resp Temp SpO2   (!) 111/56 82  20 99.5 °F (37.5 °C) 95 %      MAP       --         Physical Exam    Nursing note and vitals reviewed.  Constitutional: She appears well-developed and well-nourished. She is not diaphoretic. No distress.   History consistent with history of dementia.    HENT:   Head: Normocephalic and atraumatic.   Mouth/Throat: Oropharynx is clear and moist. No oropharyngeal exudate.   Slightly dry mucous membranes.    Eyes: Conjunctivae and EOM are normal. Pupils are equal, round, and reactive to light. Right eye exhibits no discharge. Left eye exhibits no discharge.   Neck: Neck supple. No JVD present.   Normal range of motion.  Cardiovascular:  Normal rate, regular rhythm, normal heart sounds and intact distal pulses.     Exam reveals no gallop and no friction rub.       No murmur heard.  Pulmonary/Chest: Breath sounds normal. No respiratory distress. She has no wheezes. She has no rhonchi. She has no rales.   Abdominal: Abdomen is soft. Bowel sounds are normal. She exhibits no distension. There is no abdominal tenderness. There is no rebound and no guarding.   Musculoskeletal:         General: No tenderness or edema.      Cervical back: Normal range of motion and neck supple.     Lymphadenopathy:     She has no cervical adenopathy.   Neurological: She is alert. She has normal strength. No cranial nerve deficit or sensory deficit. GCS score is 15. GCS eye subscore is 4. GCS verbal subscore is 5. GCS motor subscore is 6.   AAOx2.   Moves all extremities and carries on conversation. CN- II: PERRL; III/IV/VI: EOMI w/out evidence of nystagmus; V: no deficits appreciated to light touch bilateral face; VII: no facial weakness, no facial asymmetry. Eyebrow raise symmetric. Smile symmetric; IX/X: palate midline, and raises symmetrically; XI: shoulder shrug 5/5 bilaterally; XII: tongue is midline w/out asymmetry. Strength 4/5 to bilateral upper and lower extremities, sensation intact to light touch   Skin: Skin is warm and dry. Capillary  refill takes less than 2 seconds.   Psychiatric: She has a normal mood and affect. Thought content normal.         ED Course   Procedures  Labs Reviewed   CBC W/ AUTO DIFFERENTIAL - Abnormal; Notable for the following components:       Result Value    RBC 3.66 (*)     Hemoglobin 8.8 (*)     Hematocrit 28.5 (*)     MCV 78 (*)     MCH 24.0 (*)     MCHC 30.9 (*)     RDW 17.3 (*)     Immature Granulocytes 1.1 (*)     Immature Grans (Abs) 0.07 (*)     Lymph % 17.7 (*)     All other components within normal limits   COMPREHENSIVE METABOLIC PANEL - Abnormal; Notable for the following components:    Potassium 3.3 (*)     Glucose 126 (*)     BUN 40 (*)     Creatinine 2.5 (*)     Alkaline Phosphatase 145 (*)     eGFR 18 (*)     All other components within normal limits   TROPONIN I - Abnormal; Notable for the following components:    Troponin I 0.042 (*)     All other components within normal limits   URINALYSIS, REFLEX TO URINE CULTURE - Abnormal; Notable for the following components:    Color, UA Orange (*)     Appearance, UA Cloudy (*)     Protein, UA 1+ (*)     Occult Blood UA 3+ (*)     Leukocytes, UA 3+ (*)     All other components within normal limits    Narrative:     Specimen Source->Urine   CK - Abnormal; Notable for the following components:     (*)     All other components within normal limits   URINALYSIS MICROSCOPIC - Abnormal; Notable for the following components:    RBC, UA 6 (*)     Bacteria Many (*)     All other components within normal limits    Narrative:     Specimen Source->Urine   CARBAMAZEPINE LEVEL, TOTAL - Abnormal; Notable for the following components:    Carbamazepine Lvl 3.7 (*)     All other components within normal limits   SARS-COV-2 RDRP GENE - Abnormal; Notable for the following components:    POC Rapid COVID Positive (*)     All other components within normal limits   POCT GLUCOSE - Abnormal; Notable for the following components:    POCT Glucose 211 (*)     All other components  within normal limits   POCT GLUCOSE - Abnormal; Notable for the following components:    POCT Glucose 168 (*)     All other components within normal limits   POCT GLUCOSE - Abnormal; Notable for the following components:    POCT Glucose 142 (*)     All other components within normal limits   POCT GLUCOSE - Abnormal; Notable for the following components:    POCT Glucose 124 (*)     All other components within normal limits   B-TYPE NATRIURETIC PEPTIDE   MAGNESIUM   TSH   LACTIC ACID, PLASMA   CARBAMAZEPINE LEVEL, TOTAL   HEMOGLOBIN A1C   TROPONIN I   POCT INFLUENZA A/B MOLECULAR   POCT GLUCOSE MONITORING CONTINUOUS   POCT GLUCOSE MONITORING CONTINUOUS   POCT GLUCOSE MONITORING CONTINUOUS          Imaging Results              CT Head Without Contrast (Final result)  Result time 08/06/23 18:06:57      Final result by Kitty Ovalles MD (08/06/23 18:06:57)                   Impression:      No acute intracranial abnormality detected.  Cerebral atrophy and chronic small vessel ischemic changes.    Sinus disease.    Empty sella.      Electronically signed by: Kitty Ovalles  Date:    08/06/2023  Time:    18:06               Narrative:    EXAMINATION:  CT OF THE HEAD WITHOUT    CLINICAL HISTORY:  Seizure, new-onset, no history of trauma;    TECHNIQUE:  5 mm unenhanced axial images were obtained from the skull base to the vertex.    COMPARISON:  04/26/2023    FINDINGS:  Moderate cerebral atrophy and chronic small vessel ischemic changes are present.  There is no acute intracranial hemorrhage, territorial infarct or mass effect, or midline shift.  The sella is empty.  In the visualized paranasal sinuses, there is an air-fluid level seen in the left maxillary sinus.  There is moderate patchy opacification of bilateral mastoid air cells.  There is mucoperiosteal thickening seen in the left frontal sinus.  Mild mucoperiosteal thickening is seen in the visualized anterior aspects of sphenoid sinuses and the right maxillary  sinus.  Hyperostosis frontalis is present.                                       X-Ray Chest AP Portable (Final result)  Result time 08/06/23 16:15:46      Final result by Kishore Mendez MD (08/06/23 16:15:46)                   Impression:      No acute chest disease identified.      Electronically signed by: Kishore Mendez MD  Date:    08/06/2023  Time:    16:15               Narrative:    EXAMINATION:  XR CHEST AP PORTABLE    CLINICAL HISTORY:  Syncope and collapse    TECHNIQUE:  Single frontal view of the chest was performed.    COMPARISON:  08/15/2011.    FINDINGS:  The heart and mediastinal structures appear unremarkable.  Atherosclerotic calcification is present within the aortic arch.  Pulmonary vasculature appears to be within normal limits.  The lungs are free of focal consolidations.  There is no evidence for pneumothorax or large pleural effusions.  Bony structures are grossly intact.                                       Medications   potassium bicarbonate disintegrating tablet 20 mEq (20 mEq Oral Not Given 8/6/23 1715)   aspirin tablet 325 mg (325 mg Oral Not Given 8/6/23 1715)   sodium chloride 0.9% flush 10 mL (has no administration in time range)   glucose chewable tablet 16 g (has no administration in time range)   glucose chewable tablet 24 g (has no administration in time range)   dextrose 50% injection 12.5 g (has no administration in time range)   dextrose 50% injection 25 g (has no administration in time range)   glucagon (human recombinant) injection 1 mg (has no administration in time range)   ascorbic acid (vitamin C) tablet 500 mg (500 mg Oral Not Given 8/6/23 2100)   multivitamin tablet (has no administration in time range)   acetaminophen tablet 500 mg (has no administration in time range)   senna-docusate 8.6-50 mg per tablet 1 tablet (has no administration in time range)   melatonin tablet 6 mg (has no administration in time range)   amLODIPine tablet 10 mg (has no administration in  time range)   carBAMazepine 12 hr tablet 200 mg (has no administration in time range)   carvediloL tablet 3.125 mg (3.125 mg Oral Not Given 8/6/23 2200)   donepeziL tablet 5 mg (has no administration in time range)   pravastatin tablet 40 mg (40 mg Oral Not Given 8/6/23 2200)   0.9%  NaCl infusion ( Intravenous New Bag 8/6/23 2152)   lactated ringers bolus 1,000 mL (0 mLs Intravenous Stopped 8/6/23 1745)     Medical Decision Making:   History:   I obtained history from: someone other than patient and EMS provider.       <> Summary of History: Additional history is provided by independent historian: patient's granddaughter.    Old Medical Records: I decided to obtain old medical records.  Old Records Summarized: other records.  Independently Interpreted Test(s):   I have ordered and independently interpreted EKG Reading(s) - see summary below       <> Summary of EKG Reading(s): EKG independently interpreted by Denita Maria MD reads: See ED course.    Clinical Tests:   Lab Tests: Ordered and Reviewed  Radiological Study: Ordered and Reviewed  Medical Tests: Ordered and Reviewed  MDM  MDM:    89 y.o.female with PMHx as noted above presents with near syncope vs seizure, hypoglycemia, hypotension. Physical exam remarkable for well appearing female, conversing with ease, no peripheral edema noted, abdomen soft, nt/nd,  in no overt respiratory distress.  ED workup remarkable for CXR - unremarkable, Pt presentation consistent with COVID19 infection, with symptoms consistent as noted above.  At this time given patient's history, physical exam, and ED workup do not suspect acute PE, acute MI/ACS, PTX, CHF/COPD exacerbation, bacterial PNA, septic shock, acute respiratory failure, or any further malignant cause.      Patient near syncope vs seizure. History of seizure once in the past per family.    Per EMS patient started shaking and legs gave out on her but did not have LOC, consistent with more near syncope. Noted to be  hypoglycemia and hypotensive.     Family gave insulin this morning but patient with decreased appetite, likely contributing.     Improved with D10 given by EMS and food given in ED. BG remained stable.     BP improved with IVF.     Troponin elevated at 0.042, EKG as below.     Crea elevated at 2.5 (previous 1.25), BUN 40, likely 2/2 to dehydration. Patient has not provided urine sample yet in ED and refusing in and out cath.     Potassium 3.3, replaced.     Anemia lower to 8.8 from previous baseline. Family denies melena or BRBPR.     Covid positive, fall risk so not ambulated but SPO2 WNL at rest.     Case discussed with patient and family who agree with plan for monitoring and hydration.     Case discussed with Hero who agrees to place patient in observation for further evaluation and care.             Scribe Attestation:   Scribe #1: I performed the above scribed service and the documentation accurately describes the services I performed. I attest to the accuracy of the note.        ED Course as of 08/06/23 2258   Sun Aug 06, 2023   1658 EKG 12-lead  Initial EKG with background noise but normal sinus rhythm at 74.  Possible LVH.  Difficult to ascertain ST-elevation or depression given the noise however no clear ST elevation noted.  T-wave inversions in aVL and appears to be flattening in 1.  QTC is 457.    Repeat EKG obtained still with some back round noise however sinus rhythm with first-degree AV block at 69.  No ST elevation or significant depression noted.  T-wave flattening in 1 and T-wave inversions in aVL.  Normal axis.  QTC is 450.  [JT]      ED Course User Index  [JT] Denita Maria MD               Scribstephanie attestation: I, Denita Maria, personally performed the services described in this documentation. All medical record entries made by the scribe were at my direction and in my presence.  I have reviewed the chart and agree that the record reflects my personal performance and is accurate and complete.    Clinical Impression:   Final diagnoses:  [R55] Near syncope  [N17.9] CHRISTO (acute kidney injury)  [U07.1] COVID (Primary)  [R77.8] Elevated troponin        ED Disposition Condition    Observation Stable                Denita Maria MD  08/06/23 1033

## 2023-08-06 NOTE — Clinical Note
Diagnosis: CHRISTO (acute kidney injury) [[id number]]   Future Attending Provider: MENDOZA LANDIN [[id number]]   Admitting Provider:: MENDOZA LANDIN [[id number]]

## 2023-08-07 VITALS
RESPIRATION RATE: 21 BRPM | SYSTOLIC BLOOD PRESSURE: 148 MMHG | HEART RATE: 69 BPM | DIASTOLIC BLOOD PRESSURE: 63 MMHG | OXYGEN SATURATION: 98 % | BODY MASS INDEX: 27.49 KG/M2 | WEIGHT: 165 LBS | HEIGHT: 65 IN | TEMPERATURE: 99 F

## 2023-08-07 DIAGNOSIS — U07.1 COVID-19 VIRUS DETECTED: ICD-10-CM

## 2023-08-07 LAB
ALBUMIN SERPL BCP-MCNC: 3.4 G/DL (ref 3.5–5.2)
ALBUMIN SERPL BCP-MCNC: 3.5 G/DL (ref 3.5–5.2)
ALP SERPL-CCNC: 149 U/L (ref 55–135)
ALT SERPL W/O P-5'-P-CCNC: 10 U/L (ref 10–44)
ANION GAP SERPL CALC-SCNC: 11 MMOL/L (ref 8–16)
ANION GAP SERPL CALC-SCNC: 7 MMOL/L (ref 8–16)
AST SERPL-CCNC: 16 U/L (ref 10–40)
BASOPHILS # BLD AUTO: 0.01 K/UL (ref 0–0.2)
BASOPHILS NFR BLD: 0.2 % (ref 0–1.9)
BILIRUB SERPL-MCNC: 0.2 MG/DL (ref 0.1–1)
BUN SERPL-MCNC: 29 MG/DL (ref 8–23)
BUN SERPL-MCNC: 36 MG/DL (ref 8–23)
CALCIUM SERPL-MCNC: 8.7 MG/DL (ref 8.7–10.5)
CALCIUM SERPL-MCNC: 9.4 MG/DL (ref 8.7–10.5)
CHLORIDE SERPL-SCNC: 106 MMOL/L (ref 95–110)
CHLORIDE SERPL-SCNC: 108 MMOL/L (ref 95–110)
CO2 SERPL-SCNC: 21 MMOL/L (ref 23–29)
CO2 SERPL-SCNC: 23 MMOL/L (ref 23–29)
CREAT SERPL-MCNC: 1.7 MG/DL (ref 0.5–1.4)
CREAT SERPL-MCNC: 2 MG/DL (ref 0.5–1.4)
DIFFERENTIAL METHOD: ABNORMAL
EOSINOPHIL # BLD AUTO: 0.1 K/UL (ref 0–0.5)
EOSINOPHIL NFR BLD: 1.5 % (ref 0–8)
ERYTHROCYTE [DISTWIDTH] IN BLOOD BY AUTOMATED COUNT: 17.3 % (ref 11.5–14.5)
EST. GFR  (NO RACE VARIABLE): 23 ML/MIN/1.73 M^2
EST. GFR  (NO RACE VARIABLE): 28 ML/MIN/1.73 M^2
ESTIMATED AVG GLUCOSE: 120 MG/DL (ref 68–131)
GLUCOSE SERPL-MCNC: 110 MG/DL (ref 70–110)
GLUCOSE SERPL-MCNC: 83 MG/DL (ref 70–110)
HBA1C MFR BLD: 5.8 % (ref 4–5.6)
HCT VFR BLD AUTO: 29.6 % (ref 37–48.5)
HGB BLD-MCNC: 9 G/DL (ref 12–16)
IMM GRANULOCYTES # BLD AUTO: 0.06 K/UL (ref 0–0.04)
IMM GRANULOCYTES NFR BLD AUTO: 1.3 % (ref 0–0.5)
LYMPHOCYTES # BLD AUTO: 1.6 K/UL (ref 1–4.8)
LYMPHOCYTES NFR BLD: 34.8 % (ref 18–48)
MAGNESIUM SERPL-MCNC: 2.3 MG/DL (ref 1.6–2.6)
MCH RBC QN AUTO: 23.9 PG (ref 27–31)
MCHC RBC AUTO-ENTMCNC: 30.4 G/DL (ref 32–36)
MCV RBC AUTO: 79 FL (ref 82–98)
MONOCYTES # BLD AUTO: 0.5 K/UL (ref 0.3–1)
MONOCYTES NFR BLD: 11.4 % (ref 4–15)
NEUTROPHILS # BLD AUTO: 2.3 K/UL (ref 1.8–7.7)
NEUTROPHILS NFR BLD: 50.8 % (ref 38–73)
NRBC BLD-RTO: 0 /100 WBC
PHOSPHATE SERPL-MCNC: 2.9 MG/DL (ref 2.7–4.5)
PHOSPHATE SERPL-MCNC: 3.2 MG/DL (ref 2.7–4.5)
PLATELET # BLD AUTO: 210 K/UL (ref 150–450)
PMV BLD AUTO: 10.3 FL (ref 9.2–12.9)
POTASSIUM SERPL-SCNC: 3.6 MMOL/L (ref 3.5–5.1)
POTASSIUM SERPL-SCNC: 4.3 MMOL/L (ref 3.5–5.1)
PROT SERPL-MCNC: 6.9 G/DL (ref 6–8.4)
RBC # BLD AUTO: 3.76 M/UL (ref 4–5.4)
SODIUM SERPL-SCNC: 138 MMOL/L (ref 136–145)
SODIUM SERPL-SCNC: 138 MMOL/L (ref 136–145)
TROPONIN I SERPL DL<=0.01 NG/ML-MCNC: 0.05 NG/ML (ref 0–0.03)
WBC # BLD AUTO: 4.57 K/UL (ref 3.9–12.7)

## 2023-08-07 PROCEDURE — 84100 ASSAY OF PHOSPHORUS: CPT | Performed by: PHYSICIAN ASSISTANT

## 2023-08-07 PROCEDURE — 99205 PR OFFICE/OUTPT VISIT, NEW, LEVL V, 60-74 MIN: ICD-10-PCS | Mod: ,,, | Performed by: NURSE PRACTITIONER

## 2023-08-07 PROCEDURE — 25000003 PHARM REV CODE 250: Performed by: PHYSICIAN ASSISTANT

## 2023-08-07 PROCEDURE — 63600175 PHARM REV CODE 636 W HCPCS: Performed by: NURSE PRACTITIONER

## 2023-08-07 PROCEDURE — 80053 COMPREHEN METABOLIC PANEL: CPT | Performed by: PHYSICIAN ASSISTANT

## 2023-08-07 PROCEDURE — 84100 ASSAY OF PHOSPHORUS: CPT | Mod: 59 | Performed by: NURSE PRACTITIONER

## 2023-08-07 PROCEDURE — 96361 HYDRATE IV INFUSION ADD-ON: CPT

## 2023-08-07 PROCEDURE — G0378 HOSPITAL OBSERVATION PER HR: HCPCS

## 2023-08-07 PROCEDURE — 85025 COMPLETE CBC W/AUTO DIFF WBC: CPT | Performed by: PHYSICIAN ASSISTANT

## 2023-08-07 PROCEDURE — 83735 ASSAY OF MAGNESIUM: CPT | Performed by: PHYSICIAN ASSISTANT

## 2023-08-07 PROCEDURE — 84484 ASSAY OF TROPONIN QUANT: CPT | Performed by: PHYSICIAN ASSISTANT

## 2023-08-07 PROCEDURE — 99205 OFFICE O/P NEW HI 60 MIN: CPT | Mod: ,,, | Performed by: NURSE PRACTITIONER

## 2023-08-07 RX ORDER — MOLNUPIRAVIR 200 MG/1
CAPSULE ORAL
COMMUNITY
Start: 2023-08-06 | End: 2023-08-07

## 2023-08-07 RX ORDER — ASCORBIC ACID 500 MG
500 TABLET ORAL 2 TIMES DAILY
Qty: 30 TABLET | Refills: 3 | Status: SHIPPED | OUTPATIENT
Start: 2023-08-07

## 2023-08-07 RX ORDER — HYDRALAZINE HYDROCHLORIDE 20 MG/ML
5 INJECTION INTRAMUSCULAR; INTRAVENOUS EVERY 6 HOURS PRN
Status: DISCONTINUED | OUTPATIENT
Start: 2023-08-07 | End: 2023-08-07 | Stop reason: HOSPADM

## 2023-08-07 RX ADMIN — HYDRALAZINE HYDROCHLORIDE 5 MG: 20 INJECTION INTRAMUSCULAR; INTRAVENOUS at 03:08

## 2023-08-07 RX ADMIN — OXYCODONE HYDROCHLORIDE AND ACETAMINOPHEN 500 MG: 500 TABLET ORAL at 09:08

## 2023-08-07 RX ADMIN — THERA TABS 1 TABLET: TAB at 09:08

## 2023-08-07 RX ADMIN — AMLODIPINE BESYLATE 10 MG: 5 TABLET ORAL at 09:08

## 2023-08-07 RX ADMIN — CARBAMAZEPINE 200 MG: 100 TABLET, EXTENDED RELEASE ORAL at 09:08

## 2023-08-07 NOTE — ASSESSMENT & PLAN NOTE
Cr today of 2.5. baseline of 1.2 to 1.5. suspect related to decreased PO intake in setting of COVID.  Started on gentle IVF  Renal dose medications, avoid nephrotoxic drugs, monitor intake and output  Home valsartan/HCTZ held  Was also hypoglycemic on EMS arrival to 48 suspect related to decreased PO Intake, without hypoglycemia in ED      Patient with acute kidney injury/acute renal failure likely due to pre-renal azotemia due to dehydration CHRISTO is currently stable. Baseline creatinine 1.2-1.5 - Labs reviewed- Renal function/electrolytes with Estimated Creatinine Clearance: 15.1 mL/min (A) (based on SCr of 2.5 mg/dL (H)). according to latest data. Monitor urine output and serial BMP and adjust therapy as needed. Avoid nephrotoxins and renally dose meds for GFR listed above.

## 2023-08-07 NOTE — SUBJECTIVE & OBJECTIVE
"Past Medical History:   Diagnosis Date    Alzheimer disease     Breast cancer     CKD (chronic kidney disease)     Diabetes mellitus type II     Dyslipidemia     Dyslipidemia     Essential hypertension, benign     History of CVA (cerebrovascular accident)     Hypercholesteremia     Microalbuminuria     Mild anemia     Seizure disorder        No past surgical history on file.    Review of patient's allergies indicates:  No Known Allergies    No current facility-administered medications on file prior to encounter.     Current Outpatient Medications on File Prior to Encounter   Medication Sig    acetaminophen (TYLENOL) 500 MG tablet Take 1 tablet (500 mg total) by mouth every 6 (six) hours as needed for Temperature greater than or Pain.    amLODIPine (NORVASC) 10 MG tablet Take 1 tablet (10 mg total) by mouth once daily.    blood sugar diagnostic Strp 1 strip by Misc.(Non-Drug; Combo Route) route 3 (three) times daily.    blood-glucose meter Misc 1 Device by Misc.(Non-Drug; Combo Route) route 3 (three) times daily.    carBAMazepine (CARBATROL) 200 MG CM12 Take 1 capsule (200 mg total) by mouth 2 (two) times daily.    carvediloL (COREG) 3.125 MG tablet Take 1 tablet (3.125 mg total) by mouth 2 (two) times daily.    diclofenac sodium (VOLTAREN) 1 % Gel Apply 2 g topically 4 (four) times daily as needed (Pain).    donepeziL (ARICEPT) 5 MG tablet Take 1 tablet (5 mg total) by mouth once daily.    ergocalciferol (VITAMIN D2) 50,000 unit Cap Take 1 capsule (50,000 Units total) by mouth every 7 days.    HUMALOG MIX 75-25,U-100,INSULN 100 unit/mL (75-25) Susp 30 units QAM, 15 units QPM daily    insulin syringe-needle U-100 (INS SYRINGE/NEEDLE 0.5 ML 27 G) 1/2 mL 27 gauge x 1/2" Syrg 1 each by Misc.(Non-Drug; Combo Route) route 2 (two) times daily.    LIDOcaine (LIDODERM) 5 % Place 1 patch onto the skin once daily. Remove & Discard patch within 12 hours then leave off for 12 hours    pravastatin (PRAVACHOL) 40 MG tablet Take 1 " "tablet (40 mg total) by mouth every evening.    rivaroxaban (XARELTO) 2.5 mg Tab TAKE 1 TABLET(2.5 MG) BY MOUTH EVERY DAY    tiZANidine (ZANAFLEX) 2 MG tablet Take 2 tablets (4 mg total) by mouth every 6 (six) hours as needed (Pain).    UNIFINE PENTIPS 32 gauge x 1/4" Ndle 2 (two) times daily.    valsartan-hydrochlorothiazide (DIOVAN-HCT) 320-25 mg per tablet Take 1 tablet by mouth once daily.     Family History       Problem Relation (Age of Onset)    Diabetes Mother          Tobacco Use    Smoking status: Never    Smokeless tobacco: Never   Substance and Sexual Activity    Alcohol use: No    Drug use: No    Sexual activity: Never     Review of Systems   Unable to perform ROS: Dementia     Objective:     Vital Signs (Most Recent):  Temp: 99.5 °F (37.5 °C) (08/06/23 1524)  Pulse: 83 (08/06/23 1906)  Resp: 20 (08/06/23 1642)  BP: (!) 164/73 (08/06/23 1851)  SpO2: 98 % (08/06/23 1906) Vital Signs (24h Range):  Temp:  [99.5 °F (37.5 °C)] 99.5 °F (37.5 °C)  Pulse:  [71-83] 83  Resp:  [18-20] 20  SpO2:  [95 %-100 %] 98 %  BP: (111-164)/(56-73) 164/73     Weight: 74.8 kg (165 lb)  Body mass index is 27.46 kg/m².     Physical Exam  Vitals and nursing note reviewed.   Constitutional:       General: She is not in acute distress.     Appearance: She is well-developed. She is not diaphoretic.   HENT:      Head: Normocephalic and atraumatic.      Right Ear: External ear normal.      Left Ear: External ear normal.   Eyes:      General:         Right eye: No discharge.         Left eye: No discharge.      Conjunctiva/sclera: Conjunctivae normal.   Neck:      Thyroid: No thyromegaly.   Cardiovascular:      Rate and Rhythm: Normal rate and regular rhythm.      Heart sounds: No murmur heard.  Pulmonary:      Effort: Pulmonary effort is normal. No respiratory distress.      Breath sounds: Normal breath sounds.   Abdominal:      General: Bowel sounds are normal. There is no distension.      Palpations: Abdomen is soft. There is no " mass.      Tenderness: There is no abdominal tenderness.   Musculoskeletal:         General: No deformity.      Cervical back: Normal range of motion and neck supple.      Right lower leg: No edema.      Left lower leg: No edema.   Skin:     General: Skin is warm and dry.   Neurological:      Mental Status: She is alert.      Sensory: No sensory deficit.      Comments: Symmetric movement of BUE and BLE against gravity   Psychiatric:         Mood and Affect: Mood normal.         Behavior: Behavior normal.                Significant Labs: CBC:   Recent Labs   Lab 08/06/23  1610   WBC 6.38   HGB 8.8*   HCT 28.5*        CMP:   Recent Labs   Lab 08/06/23  1610      K 3.3*      CO2 23   *   BUN 40*   CREATININE 2.5*   CALCIUM 8.9   PROT 7.0   ALBUMIN 3.6   BILITOT 0.2   ALKPHOS 145*   AST 13   ALT 10   ANIONGAP 9     Cardiac Markers:   Recent Labs   Lab 08/06/23  1610   BNP 50     Lactic Acid:   Recent Labs   Lab 08/06/23  1610   LACTATE 1.7     Troponin:   Recent Labs   Lab 08/06/23  1610   TROPONINI 0.042*     TSH:   Recent Labs   Lab 08/06/23  1610   TSH 1.188     Urine Studies:   Recent Labs   Lab 08/06/23  1959   COLORU Orange*   APPEARANCEUA Cloudy*   PHUR 6.0   SPECGRAV 1.015   PROTEINUA 1+*   GLUCUA Negative   KETONESU Negative   BILIRUBINUA Negative   OCCULTUA 3+*   NITRITE Negative   UROBILINOGEN Negative   LEUKOCYTESUR 3+*   RBCUA 6*   WBCUA 4   BACTERIA Many*   SQUAMEPITHEL 9   HYALINECASTS 0       Significant Imaging:   Imaging Results              CT Head Without Contrast (Final result)  Result time 08/06/23 18:06:57      Final result by Kitty Ovalles MD (08/06/23 18:06:57)                   Impression:      No acute intracranial abnormality detected.  Cerebral atrophy and chronic small vessel ischemic changes.    Sinus disease.    Empty sella.      Electronically signed by: Kitty Ovalles  Date:    08/06/2023  Time:    18:06               Narrative:    EXAMINATION:  CT OF  THE HEAD WITHOUT    CLINICAL HISTORY:  Seizure, new-onset, no history of trauma;    TECHNIQUE:  5 mm unenhanced axial images were obtained from the skull base to the vertex.    COMPARISON:  04/26/2023    FINDINGS:  Moderate cerebral atrophy and chronic small vessel ischemic changes are present.  There is no acute intracranial hemorrhage, territorial infarct or mass effect, or midline shift.  The sella is empty.  In the visualized paranasal sinuses, there is an air-fluid level seen in the left maxillary sinus.  There is moderate patchy opacification of bilateral mastoid air cells.  There is mucoperiosteal thickening seen in the left frontal sinus.  Mild mucoperiosteal thickening is seen in the visualized anterior aspects of sphenoid sinuses and the right maxillary sinus.  Hyperostosis frontalis is present.                                       X-Ray Chest AP Portable (Final result)  Result time 08/06/23 16:15:46      Final result by Kishore Mendez MD (08/06/23 16:15:46)                   Impression:      No acute chest disease identified.      Electronically signed by: Kishore Mendez MD  Date:    08/06/2023  Time:    16:15               Narrative:    EXAMINATION:  XR CHEST AP PORTABLE    CLINICAL HISTORY:  Syncope and collapse    TECHNIQUE:  Single frontal view of the chest was performed.    COMPARISON:  08/15/2011.    FINDINGS:  The heart and mediastinal structures appear unremarkable.  Atherosclerotic calcification is present within the aortic arch.  Pulmonary vasculature appears to be within normal limits.  The lungs are free of focal consolidations.  There is no evidence for pneumothorax or large pleural effusions.  Bony structures are grossly intact.

## 2023-08-07 NOTE — HOSPITAL COURSE
Admission to observation for near syncope due to hypoglycemia and CHIRSTO. Found also to have COVID 19 infection - no respiratory symptoms or hypoxia. History of dementia- baseline oriented to self only. Mental status at baseline on discharge. No further hypoglycemia. HgbA1c 5.8. Renal function close to baseline. Stop home insulin and continue to hold ARB/hctz. VS stable and oxygenating well on RA.   Seen by Palliative Care team and Daughter Jossie wish for informational hospice visit. [facility] will meet with daughter at home on discharge.

## 2023-08-07 NOTE — ED NOTES
"Attempting to give pt medications, pt states "Someone is going to senior care, you don't have no medicines for me. I'm not taking that." Pt refusing to state name and states, "You should know that". Pt informed that she is in the hospital. Pt continues to refuse care from nurses.   "

## 2023-08-07 NOTE — SUBJECTIVE & OBJECTIVE
Past Medical History:   Diagnosis Date    Alzheimer disease     Breast cancer     CKD (chronic kidney disease)     Diabetes mellitus type II     Dyslipidemia     Dyslipidemia     Essential hypertension, benign     History of CVA (cerebrovascular accident)     Hypercholesteremia     Microalbuminuria     Mild anemia     Seizure disorder        No past surgical history on file.    Review of patient's allergies indicates:  No Known Allergies    Medications:  Continuous Infusions:   sodium chloride 0.9% 100 mL/hr at 08/07/23 0715     Scheduled Meds:   amLODIPine  10 mg Oral Daily    ascorbic acid (vitamin C)  500 mg Oral BID    carBAMazepine  200 mg Oral BID    donepeziL  5 mg Oral Daily    multivitamin  1 tablet Oral Daily    pravastatin  40 mg Oral QHS     PRN Meds:acetaminophen, dextrose 50%, dextrose 50%, glucagon (human recombinant), glucose, glucose, hydrALAZINE, melatonin, senna-docusate 8.6-50 mg, sodium chloride 0.9%    Family History       Problem Relation (Age of Onset)    Diabetes Mother          Tobacco Use    Smoking status: Never    Smokeless tobacco: Never   Substance and Sexual Activity    Alcohol use: No    Drug use: No    Sexual activity: Never       Review of Systems   Unable to perform ROS: Dementia     Objective:     Vital Signs (Most Recent):  Temp: 98.6 °F (37 °C) (08/07/23 0603)  Pulse: 74 (08/07/23 1004)  Resp: (!) 31 (08/07/23 1004)  BP: (!) 161/73 (08/07/23 1004)  SpO2: 99 % (08/07/23 1004) Vital Signs (24h Range):  Temp:  [98.6 °F (37 °C)-99.5 °F (37.5 °C)] 98.6 °F (37 °C)  Pulse:  [71-83] 74  Resp:  [11-31] 31  SpO2:  [95 %-100 %] 99 %  BP: (111-165)/(56-83) 161/73     Weight: 74.8 kg (165 lb)  Body mass index is 27.46 kg/m².       Physical Exam  Constitutional:       General: She is not in acute distress.     Appearance: She is obese. She is ill-appearing. She is not toxic-appearing or diaphoretic.   HENT:      Head: Normocephalic and atraumatic.      Right Ear: External ear normal.       Left Ear: External ear normal.      Nose: Nose normal.   Eyes:      General:         Right eye: No discharge.   Cardiovascular:      Rate and Rhythm: Normal rate and regular rhythm.   Pulmonary:      Effort: Pulmonary effort is normal.      Breath sounds: Wheezing (cough) present.   Abdominal:      General: Abdomen is flat.   Musculoskeletal:      Right lower leg: No edema.      Left lower leg: No edema.   Skin:     General: Skin is warm and dry.   Neurological:      Mental Status: She is alert. Mental status is at baseline.      Motor: Weakness present.      Comments: Oriented to self only   Psychiatric:      Comments: Pleasant, calm                Advance Care Planning   Advance Directives:     Decision Making:  Family answered questions and Patient unable to communicate due to disease severity/cognitive impairment  Goals of Care: The family endorses that what is most important right now is to focus on spending time at home, avoiding the hospital, quality of life, even if it means sacrificing a little time, and comfort and QOL              Significant Labs: All pertinent labs within the past 24 hours have been reviewed.  CBC:   Recent Labs   Lab 08/07/23 0414   WBC 4.57   HGB 9.0*   HCT 29.6*   MCV 79*        BMP:  Recent Labs   Lab 08/07/23 0414   GLU 83      K 3.6      CO2 21*   BUN 36*   CREATININE 2.0*   CALCIUM 8.7   MG 2.3     LFT:  Lab Results   Component Value Date    AST 16 08/07/2023    ALKPHOS 149 (H) 08/07/2023    BILITOT 0.2 08/07/2023     Albumin:   Albumin   Date Value Ref Range Status   08/07/2023 3.4 (L) 3.5 - 5.2 g/dL Final     Protein:   Total Protein   Date Value Ref Range Status   08/07/2023 6.9 6.0 - 8.4 g/dL Final     Lactic acid:   Lab Results   Component Value Date    LACTATE 1.7 08/06/2023       Significant Imaging: CT: I have reviewed all pertinent results/findings within the past 24 hours:  head , CXR

## 2023-08-07 NOTE — ED NOTES
"Attempted to pull patient up in bed, pt states "You better leave me alone". Pt in bed resting quietly, bed in lowest position.   "

## 2023-08-07 NOTE — PLAN OF CARE
[facility]  Discharge Assessment    Primary Care Provider: Sammy Woody MD   Case Management Assessment     PCP: See above  Pharmacy: [facility] on [address]    Patient Arrived From: Home with daughter  Existing Help at Home: Daughter    Barriers to Discharge: None    Discharge Plan:    A. Home with home health   B. Home with family      Discharge planning assessment completed with assistance from patient's daughter, Jossie, via telephone.  Patient from home with daughter and needed assistance with ADLs.  Patient uses a cane to assist with ambulation.  Patient's daughter feels that patient would benefit from a TTB and a Purewick.  Patient may also benefit from home health or [facility].        Discharge Assessment (most recent)       BRIEF DISCHARGE ASSESSMENT - 08/07/23 0981          Discharge Planning    Assessment Type Discharge Planning Brief Assessment     Resource/Environmental Concerns none     Support Systems Children     Assistance Needed Needs assistance with bathing.  Uses a cane to assist with ambulation.  Has nighttime incontinence and would like a Purewick at discharge if possible.     Equipment Currently Used at Home cane, straight;bedside commode     Current Living Arrangements home     Care Facility Name n/a     Patient/Family Anticipates Transition to home with family     Patient/Family Anticipated Services at Transition home health care;outpatient care     DME Needed Upon Discharge  bath bench;other (see comments)   Purewick    Discharge Plan A Home Health     Discharge Plan B Home with family

## 2023-08-07 NOTE — HPI
Jossie Morejon 89 y.o. female with dementia, CKD, HTN HLD, seizure disorder presents to the hospital chief complaint of near-syncope.  The patient has dementia and is unsure what brought her to the hospital and denies any complaints at this time.    For discussion with the patient's daughter she reports today took her mother urgent care to be tested for COVID as she tested positive for COVID and family member in [place] recently tested positive for COVID.  Upon returning home and ambulating from the car back to her home she experienced a near syncopal episode with associated generalized shaking.  She was assisted to the ground by the daughter.  She is at her baseline mental status per the daughter which is disoriented to time and place.  The daughter reports she was given Xarelto by her PCP for possible vascular dementia.  The patient has had decreased p.o. intake for the last week, but the daughter has continued to give her insulin.    In the ED, she was initially hypoglycemic to 48 upon EMS arrival her creatinine is 2.6 troponin 0.042 COVID positive without hypoxia on room air chest x-ray without acute process CT head without acute abnormality, TSH and lactic acid negative.

## 2023-08-07 NOTE — CONSULTS
[facility] - Emergency Dept  Palliative Medicine  Consult Note    Patient Name: Jossie Morejon  MRN: [id number]  Admission Date: 8/6/2023  Hospital Length of Stay: 0 days  Code Status: Full Code   Attending Provider: Alondra Mckeon, *  Consulting Provider: Shayna Huynh NP  Primary Care Physician: Sammy Woody MD  Principal Problem:COVID-19 virus detected    Patient information was obtained from relative(s), past medical records, ER records and primary team.      Inpatient consult to Palliative Care  Consult performed by: Shayna Huynh NP  Consult ordered by: Jocelin Shankar NP  Reason for consult: GOC        Assessment/Plan:   Palliative encounter:  Advance Care Planning   -Palliative consult recieved   -chart reviewed in detail  -discussed with  PERICO Monreal  -at time of consult patient in ED. Her daughter at bedside but patient did not know her. I introduced myself and my role.  Patient did laugh during my visit and only said a few words but unable to carry on a conversation. Daughter reports patient lives with her and she is able to work at home so she can watch her.    Per daughter patient is at baseline  -we discussed patient's dementia as a progressive disease and that she does meet criteria for hospice.   -Discussed hospice and how hospice can assist with offering the best QOL at this time in the patients illness.   Daughter agreeable to informational hospice visit and agrees this is likely something she and her sister will agree to. Patient is being dc'd soon and daughter would like for hospice to meet her at the home.  D[facility] interested in [facility]. Call to them and they will meet after d/c today  -Addressed all questions and concerns  -emotional support; daughter is a breast cancer survivor and needs support and will also benefit from hospice counseling for c/g      Late onset Alzheimer's disease without behavioral disturbance  -At baseline disoriented to time and place. At  baseline per daughter  -FAST 7a  -PPS 40%  -meet hospice criteria      Acute renal failure superimposed on stage 3 chronic kidney disease  -Patient with acute kidney injury/acute renal failure likely due to pre-renal azotemia due to dehydration  -mgmt per primary    Seizure disorder  -No recent seizure activity per daughter.    -on carbamazepine     Covid 19 virus  -noted  -not symptomatic     HTN, goal below 140/90  -noted     Diabetes mellitus with renal manifestations, controlled  -noted    Thank you for your consult.     Subjective:       HPI: Jossie Morejon 89 y.o. female with dementia, CKD, HTN HLD, seizure disorder presents to the hospital chief complaint of near-syncope.  The patient has dementia and is unsure what brought her to the hospital and denies any complaints at this time.     For discussion with the patient's daughter she reports today took her mother urgent care to be tested for COVID as she tested positive for COVID and family member in [place] recently tested positive for COVID.  Upon returning home and ambulating from the car back to her home she experienced a near syncopal episode with associated generalized shaking.  She was assisted to the ground by the daughter.  She is at her baseline mental status per the daughter which is disoriented to time and place.  The daughter reports she was given Xarelto by her PCP for possible vascular dementia.  The patient has had decreased p.o. intake for the last week, but the daughter has continued to give her insulin.     In the ED, she was initially hypoglycemic to 48 upon EMS arrival her creatinine is 2.6 troponin 0.042 COVID positive without hypoxia on room air chest x-ray without acute process CT head without acute abnormality, TSH and lactic acid negative.         Hospital Course:  No notes on file        Past Medical History:   Diagnosis Date    Alzheimer disease     Breast cancer     CKD (chronic kidney disease)     Diabetes mellitus type II      Dyslipidemia     Dyslipidemia     Essential hypertension, benign     History of CVA (cerebrovascular accident)     Hypercholesteremia     Microalbuminuria     Mild anemia     Seizure disorder        No past surgical history on file.    Review of patient's allergies indicates:  No Known Allergies    Medications:  Continuous Infusions:   sodium chloride 0.9% 100 mL/hr at 08/07/23 0715     Scheduled Meds:   amLODIPine  10 mg Oral Daily    ascorbic acid (vitamin C)  500 mg Oral BID    carBAMazepine  200 mg Oral BID    donepeziL  5 mg Oral Daily    multivitamin  1 tablet Oral Daily    pravastatin  40 mg Oral QHS     PRN Meds:acetaminophen, dextrose 50%, dextrose 50%, glucagon (human recombinant), glucose, glucose, hydrALAZINE, melatonin, senna-docusate 8.6-50 mg, sodium chloride 0.9%    Family History       Problem Relation (Age of Onset)    Diabetes Mother          Tobacco Use    Smoking status: Never    Smokeless tobacco: Never   Substance and Sexual Activity    Alcohol use: No    Drug use: No    Sexual activity: Never       Review of Systems   Unable to perform ROS: Dementia     Objective:     Vital Signs (Most Recent):  Temp: 98.6 °F (37 °C) (08/07/23 0603)  Pulse: 74 (08/07/23 1004)  Resp: (!) 31 (08/07/23 1004)  BP: (!) 161/73 (08/07/23 1004)  SpO2: 99 % (08/07/23 1004) Vital Signs (24h Range):  Temp:  [98.6 °F (37 °C)-99.5 °F (37.5 °C)] 98.6 °F (37 °C)  Pulse:  [71-83] 74  Resp:  [11-31] 31  SpO2:  [95 %-100 %] 99 %  BP: (111-165)/(56-83) 161/73     Weight: 74.8 kg (165 lb)  Body mass index is 27.46 kg/m².       Physical Exam  Constitutional:       General: She is not in acute distress.     Appearance: She is obese. She is ill-appearing. She is not toxic-appearing or diaphoretic.   HENT:      Head: Normocephalic and atraumatic.      Right Ear: External ear normal.      Left Ear: External ear normal.      Nose: Nose normal.   Eyes:      General:         Right eye: No discharge.   Cardiovascular:       Rate and Rhythm: Normal rate and regular rhythm.   Pulmonary:      Effort: Pulmonary effort is normal.      Breath sounds: Wheezing (cough) present.   Abdominal:      General: Abdomen is flat.   Musculoskeletal:      Right lower leg: No edema.      Left lower leg: No edema.   Skin:     General: Skin is warm and dry.   Neurological:      Mental Status: She is alert. Mental status is at baseline.      Motor: Weakness present.      Comments: Oriented to self only   Psychiatric:      Comments: Pleasant, calm                Advance Care Planning  Advance Directives:     Decision Making:  Family answered questions and Patient unable to communicate due to disease severity/cognitive impairment  Goals of Care: The family endorses that what is most important right now is to focus on spending time at home, avoiding the hospital, quality of life, even if it means sacrificing a little time, and comfort and QOL              Significant Labs: All pertinent labs within the past 24 hours have been reviewed.  CBC:   Recent Labs   Lab 08/07/23 0414   WBC 4.57   HGB 9.0*   HCT 29.6*   MCV 79*        BMP:  Recent Labs   Lab 08/07/23 0414   GLU 83      K 3.6      CO2 21*   BUN 36*   CREATININE 2.0*   CALCIUM 8.7   MG 2.3     LFT:  Lab Results   Component Value Date    AST 16 08/07/2023    ALKPHOS 149 (H) 08/07/2023    BILITOT 0.2 08/07/2023     Albumin:   Albumin   Date Value Ref Range Status   08/07/2023 3.4 (L) 3.5 - 5.2 g/dL Final     Protein:   Total Protein   Date Value Ref Range Status   08/07/2023 6.9 6.0 - 8.4 g/dL Final     Lactic acid:   Lab Results   Component Value Date    LACTATE 1.7 08/06/2023       Significant Imaging: CT: I have reviewed all pertinent results/findings within the past 24 hours:  head , CXR        I spent a total of 88 minutes on the day of the visit. This includes face to face time in discussion of goals of care(18 mins of total time), symptom assessment, coordination of care and  emotional support.  This also includes non-face to face time preparing to see the patient (eg, review of tests/imaging), obtaining and/or reviewing separately obtained history, documenting clinical information in the electronic or other health record, independently interpreting results and communicating results to the patient/family/caregiver, or care coordinator.    Shayna Huynh NP  Palliative Medicine  [facility] - [place] - Emergency Dept

## 2023-08-07 NOTE — PHARMACY MED REC
"Admission Medication History     The home medication history was taken by Geno Hawkins CPhT.      You may go to "Admission" then "Reconcile Home Medications" tabs to review and/or act upon these items.     The home medication list has been updated by the Pharmacy department.   Please read ALL comments highlighted in yellow.   Please address this information as you see fit.    Feel free to contact us if you have any questions or require assistance.      Patient is refusing Medication History Interview.  Stated, "get out of here and leave me alone".        Geno Hawkins CP.  [phone]                .        "

## 2023-08-07 NOTE — DISCHARGE SUMMARY
[facility]  [facility] Medicine  Discharge Summary      Patient Name: Jossie Morejon  MRN: [id number]  NAIDA: [id number]  Patient Class: OP- Observation  Admission Date: 8/6/2023  Hospital Length of Stay: 0 days  Discharge Date and Time:  08/07/2023 1:52 PM  Attending Physician: Alondra Mckeon, *   Discharging Provider: Cassidy Oseguera NP  Primary Care Provider: Sammy Woody MD    Primary Care Team: CASSIDY OSEGUERA    HPI:   Jossie Morejon 89 y.o. female with dementia, CKD, HTN HLD, seizure disorder presents to the hospital chief complaint of near-syncope.  The patient has dementia and is unsure what brought her to the hospital and denies any complaints at this time.    For discussion with the patient's daughter she reports today took her mother urgent care to be tested for COVID as she tested positive for COVID and family member in [place] recently tested positive for COVID.  Upon returning home and ambulating from the car back to her home she experienced a near syncopal episode with associated generalized shaking.  She was assisted to the ground by the daughter.  She is at her baseline mental status per the daughter which is disoriented to time and place.  The daughter reports she was given Xarelto by her PCP for possible vascular dementia.  The patient has had decreased p.o. intake for the last week, but the daughter has continued to give her insulin.    In the ED, she was initially hypoglycemic to 48 upon EMS arrival her creatinine is 2.6 troponin 0.042 COVID positive without hypoxia on room air chest x-ray without acute process CT head without acute abnormality, TSH and lactic acid negative.      * No surgery found *      Hospital Course:   Admission to observation for near syncope due to hypoglycemia and CHRISTO. Found also to have COVID 19 infection - no respiratory symptoms or hypoxia. History of dementia- baseline oriented to self only. Mental status at baseline on discharge. No further  hypoglycemia. HgbA1c 5.8. Renal function close to baseline. Stop home insulin and continue to hold ARB/hctz. VS stable and oxygenating well on RA.   Seen by Palliative Care team and Daughter Jossie burch for informational hospice visit. [facility] will meet with daughter at home on discharge.        Goals of Care Treatment Preferences:  Code Status: Full Code              Consults:   Consults (From admission, onward)        Status Ordering Provider     Inpatient consult to Palliative Care  Once        Provider:  (Not yet assigned)    CASSIDY Boyle          No new Assessment & Plan notes have been filed under this hospital service since the last note was generated.  Service: Hospital Medicine    Final Active Diagnoses:    Diagnosis Date Noted POA    PRINCIPAL PROBLEM:  COVID-19 virus detected [U07.1] 08/06/2023 Yes    Acute renal failure superimposed on stage 3 chronic kidney disease [N17.9, N18.30] 08/06/2023 Yes    Seizure disorder [G40.909] 01/17/2019 Yes    HTN, goal below 140/90 [I10] 03/19/2018 Yes    Late onset Alzheimer's disease without behavioral disturbance [G30.1, F02.80] 04/26/2017 Yes    BMI 32.0-32.9,adult [Z68.32] 02/10/2016 Not Applicable    Diabetes mellitus with renal manifestations, controlled [E11.29] 04/15/2013 Yes    Dyslipidemia [E78.5] 11/16/2012 Yes      Problems Resolved During this Admission:       Discharged Condition: stable    Disposition: Home or Self Care    Follow Up:   Follow-up Information     Sammy Woody MD Follow up.    Specialty: Family Medicine  Contact information:  [address] [address] [phone]             [facility] Follow up.    Why: See contact information for future reference.  Contact information:  [address] [address] [phone]                     Patient Instructions:      Ambulatory referral/consult to [facility]   Standing  Status: Future   Referral Priority: Routine Referral Type: Consultation   Referral Reason: Specialty Services Required   Number of Visits Requested: 1     Diet Cardiac     Diet diabetic     Notify your health care provider if you experience any of the following:  temperature >100.4     Notify your health care provider if you experience any of the following:  increased confusion or weakness     Notify your health care provider if you experience any of the following:  difficulty breathing or increased cough     Activity as tolerated       Significant Diagnostic Studies: N/A    Pending Diagnostic Studies:     None         Medications:  Reconciled Home Medications:      Medication List      START taking these medications    ascorbic acid (vitamin C) 500 MG tablet  Commonly known as: VITAMIN C  Take 1 tablet (500 mg total) by mouth 2 (two) times daily.     multivitamin Tab  Take 1 tablet by mouth once daily.  Start taking on: August 8, 2023        CONTINUE taking these medications    acetaminophen 500 MG tablet  Commonly known as: TYLENOL  Take 1 tablet (500 mg total) by mouth every 6 (six) hours as needed for Temperature greater than or Pain.     amLODIPine 10 MG tablet  Commonly known as: NORVASC  Take 1 tablet (10 mg total) by mouth once daily.     blood sugar diagnostic Strp  1 strip by Misc.(Non-Drug; Combo Route) route 3 (three) times daily.     blood-glucose meter Misc  1 Device by Misc.(Non-Drug; Combo Route) route 3 (three) times daily.     carBAMazepine 200 MG Cm12  Commonly known as: CARBATROL  Take 1 capsule (200 mg total) by mouth 2 (two) times daily.     carvediloL 3.125 MG tablet  Commonly known as: COREG  Take 1 tablet (3.125 mg total) by mouth 2 (two) times daily.     diclofenac sodium 1 % Gel  Commonly known as: VOLTAREN  Apply 2 g topically 4 (four) times daily as needed (Pain).     donepeziL 5 MG tablet  Commonly known as: ARICEPT  Take 1 tablet (5 mg total) by mouth once daily.     ergocalciferol  "50,000 unit Cap  Commonly known as: VITAMIN D2  Take 1 capsule (50,000 Units total) by mouth every 7 days.     insulin syringe-needle U-100 1/2 mL 27 gauge x 1/2" Syrg  Commonly known as: INS SYRINGE/NEEDLE 0.5 ML 27 G  1 each by Misc.(Non-Drug; Combo Route) route 2 (two) times daily.     LIDOcaine 5 %  Commonly known as: LIDODERM  Place 1 patch onto the skin once daily. Remove & Discard patch within 12 hours then leave off for 12 hours     pravastatin 40 MG tablet  Commonly known as: PRAVACHOL  Take 1 tablet (40 mg total) by mouth every evening.     UNIFINE PENTIPS 32 gauge x 1/4" Ndle  Generic drug: pen needle, diabetic  2 (two) times daily.     XARELTO 2.5 mg Tab  Generic drug: rivaroxaban  TAKE 1 TABLET(2.5 MG) BY MOUTH EVERY DAY        STOP taking these medications    HumaLOG Mix 75-25(U-100)Insuln 100 unit/mL (75-25) Susp  Generic drug: insulin lispro protamine-insulin lispro     LAGEVRIO (EUA) 200 mg capsule (EUA)  Generic drug: molnupiravir     tiZANidine 2 MG tablet  Commonly known as: ZANAFLEX     valsartan-hydrochlorothiazide 320-25 mg per tablet  Commonly known as: DIOVAN-HCT            Indwelling Lines/Drains at time of discharge:   Lines/Drains/Airways     None                 Time spent on the discharge of patient: 35 minutes         Jocelin Shankar NP  Department of Hospital Medicine  [facility] [place]  "

## 2023-08-07 NOTE — PLAN OF CARE
[facility] - Emergency Dept  Discharge Final Note    Primary Care Provider: Sammy Woody MD    Expected Discharge Date: 8/7/2023    Final Discharge Note (most recent)       Final Note - 08/07/23 1304          Final Note    Assessment Type Final Discharge Note     Anticipated Discharge Disposition Home or Self Care     What phone number can be called within the next 1-3 days to see how you are doing after discharge? [phone]     Hospital Resources/Appts/Education Provided Community resources provided        Post-Acute Status    Discharge Delays None known at this time                     Important Message from Medicare             Contact Info       Sammy Woody MD   Specialty: Family Medicine   Relationship: PCP - General    [address] [address]   Phone: [phone]       Next Steps: Follow up    [facility]    [address]  [facility] [place] [address]   Phone: [phone]       Next Steps: Follow up    Instructions: See contact information for future reference.        OK for patient to discharge from case managment standpoint.

## 2023-08-07 NOTE — H&P
[facility]  [facility] Medicine  History & Physical    Patient Name: Jossie Morejon  MRN: [id number]  Patient Class: OP- Observation  Admission Date: 8/6/2023  Attending Physician: Alondra Mckeon, *   Primary Care Provider: Sammy Woody MD         Patient information was obtained from patient, past medical records and ER records.     Subjective:     Principal Problem:COVID-19 virus detected    Chief Complaint:   Chief Complaint   Patient presents with    near syncope     PT to EMS triage for near syncope. Per EMS, daughter states pt was having seizure like activity while walking from car to get inside after Urgent Care visit for +covid. PMHX of dementia and only oriented to self at baseline. EMS reports upon arrival pt was at baseline with a glucose of 48. 25g of Dextrose was given with EMS and 200 cc of D10, repeat cbg was 280. Upon arrival . VSS.         HPI: Jossie Morejon 89 y.o. female with dementia, CKD, HTN HLD, seizure disorder presents to the hospital chief complaint of near-syncope.  The patient has dementia and is unsure what brought her to the hospital and denies any complaints at this time.    For discussion with the patient's daughter she reports today took her mother urgent care to be tested for COVID as she tested positive for COVID and family member in [place] recently tested positive for COVID.  Upon returning home and ambulating from the car back to her home she experienced a near syncopal episode with associated generalized shaking.  She was assisted to the ground by the daughter.  She is at her baseline mental status per the daughter which is disoriented to time and place.  The daughter reports she was given Xarelto by her PCP for possible vascular dementia.  The patient has had decreased p.o. intake for the last week, but the daughter has continued to give her insulin.    In the ED, she was initially hypoglycemic to 48 upon EMS arrival her creatinine is 2.6 troponin  "0.042 COVID positive without hypoxia on room air chest x-ray without acute process CT head without acute abnormality, TSH and lactic acid negative.      Past Medical History:   Diagnosis Date    Alzheimer disease     Breast cancer     CKD (chronic kidney disease)     Diabetes mellitus type II     Dyslipidemia     Dyslipidemia     Essential hypertension, benign     History of CVA (cerebrovascular accident)     Hypercholesteremia     Microalbuminuria     Mild anemia     Seizure disorder        No past surgical history on file.    Review of patient's allergies indicates:  No Known Allergies    No current facility-administered medications on file prior to encounter.     Current Outpatient Medications on File Prior to Encounter   Medication Sig    acetaminophen (TYLENOL) 500 MG tablet Take 1 tablet (500 mg total) by mouth every 6 (six) hours as needed for Temperature greater than or Pain.    amLODIPine (NORVASC) 10 MG tablet Take 1 tablet (10 mg total) by mouth once daily.    blood sugar diagnostic Strp 1 strip by Misc.(Non-Drug; Combo Route) route 3 (three) times daily.    blood-glucose meter Misc 1 Device by Misc.(Non-Drug; Combo Route) route 3 (three) times daily.    carBAMazepine (CARBATROL) 200 MG CM12 Take 1 capsule (200 mg total) by mouth 2 (two) times daily.    carvediloL (COREG) 3.125 MG tablet Take 1 tablet (3.125 mg total) by mouth 2 (two) times daily.    diclofenac sodium (VOLTAREN) 1 % Gel Apply 2 g topically 4 (four) times daily as needed (Pain).    donepeziL (ARICEPT) 5 MG tablet Take 1 tablet (5 mg total) by mouth once daily.    ergocalciferol (VITAMIN D2) 50,000 unit Cap Take 1 capsule (50,000 Units total) by mouth every 7 days.    HUMALOG MIX 75-25,U-100,INSULN 100 unit/mL (75-25) Susp 30 units QAM, 15 units QPM daily    insulin syringe-needle U-100 (INS SYRINGE/NEEDLE 0.5 ML 27 G) 1/2 mL 27 gauge x 1/2" Syrg 1 each by Misc.(Non-Drug; Combo Route) route 2 (two) times daily.    " "LIDOcaine (LIDODERM) 5 % Place 1 patch onto the skin once daily. Remove & Discard patch within 12 hours then leave off for 12 hours    pravastatin (PRAVACHOL) 40 MG tablet Take 1 tablet (40 mg total) by mouth every evening.    rivaroxaban (XARELTO) 2.5 mg Tab TAKE 1 TABLET(2.5 MG) BY MOUTH EVERY DAY    tiZANidine (ZANAFLEX) 2 MG tablet Take 2 tablets (4 mg total) by mouth every 6 (six) hours as needed (Pain).    UNIFINE PENTIPS 32 gauge x 1/4" Ndle 2 (two) times daily.    valsartan-hydrochlorothiazide (DIOVAN-HCT) 320-25 mg per tablet Take 1 tablet by mouth once daily.     Family History       Problem Relation (Age of Onset)    Diabetes Mother          Tobacco Use    Smoking status: Never    Smokeless tobacco: Never   Substance and Sexual Activity    Alcohol use: No    Drug use: No    Sexual activity: Never     Review of Systems   Unable to perform ROS: Dementia     Objective:     Vital Signs (Most Recent):  Temp: 99.5 °F (37.5 °C) (08/06/23 1524)  Pulse: 83 (08/06/23 1906)  Resp: 20 (08/06/23 1642)  BP: (!) 164/73 (08/06/23 1851)  SpO2: 98 % (08/06/23 1906) Vital Signs (24h Range):  Temp:  [99.5 °F (37.5 °C)] 99.5 °F (37.5 °C)  Pulse:  [71-83] 83  Resp:  [18-20] 20  SpO2:  [95 %-100 %] 98 %  BP: (111-164)/(56-73) 164/73     Weight: 74.8 kg (165 lb)  Body mass index is 27.46 kg/m².     Physical Exam  Vitals and nursing note reviewed.   Constitutional:       General: She is not in acute distress.     Appearance: She is well-developed. She is not diaphoretic.   HENT:      Head: Normocephalic and atraumatic.      Right Ear: External ear normal.      Left Ear: External ear normal.   Eyes:      General:         Right eye: No discharge.         Left eye: No discharge.      Conjunctiva/sclera: Conjunctivae normal.   Neck:      Thyroid: No thyromegaly.   Cardiovascular:      Rate and Rhythm: Normal rate and regular rhythm.      Heart sounds: No murmur heard.  Pulmonary:      Effort: Pulmonary effort is normal. No " respiratory distress.      Breath sounds: Normal breath sounds.   Abdominal:      General: Bowel sounds are normal. There is no distension.      Palpations: Abdomen is soft. There is no mass.      Tenderness: There is no abdominal tenderness.   Musculoskeletal:         General: No deformity.      Cervical back: Normal range of motion and neck supple.      Right lower leg: No edema.      Left lower leg: No edema.   Skin:     General: Skin is warm and dry.   Neurological:      Mental Status: She is alert.      Sensory: No sensory deficit.      Comments: Symmetric movement of BUE and BLE against gravity   Psychiatric:         Mood and Affect: Mood normal.         Behavior: Behavior normal.                Significant Labs: CBC:   Recent Labs   Lab 08/06/23  1610   WBC 6.38   HGB 8.8*   HCT 28.5*        CMP:   Recent Labs   Lab 08/06/23  1610      K 3.3*      CO2 23   *   BUN 40*   CREATININE 2.5*   CALCIUM 8.9   PROT 7.0   ALBUMIN 3.6   BILITOT 0.2   ALKPHOS 145*   AST 13   ALT 10   ANIONGAP 9     Cardiac Markers:   Recent Labs   Lab 08/06/23  1610   BNP 50     Lactic Acid:   Recent Labs   Lab 08/06/23  1610   LACTATE 1.7     Troponin:   Recent Labs   Lab 08/06/23  1610   TROPONINI 0.042*     TSH:   Recent Labs   Lab 08/06/23  1610   TSH 1.188     Urine Studies:   Recent Labs   Lab 08/06/23  1959   COLORU Orange*   APPEARANCEUA Cloudy*   PHUR 6.0   SPECGRAV 1.015   PROTEINUA 1+*   GLUCUA Negative   KETONESU Negative   BILIRUBINUA Negative   OCCULTUA 3+*   NITRITE Negative   UROBILINOGEN Negative   LEUKOCYTESUR 3+*   RBCUA 6*   WBCUA 4   BACTERIA Many*   SQUAMEPITHEL 9   HYALINECASTS 0       Significant Imaging:   Imaging Results              CT Head Without Contrast (Final result)  Result time 08/06/23 18:06:57      Final result by Kitty Ovalles MD (08/06/23 18:06:57)                   Impression:      No acute intracranial abnormality detected.  Cerebral atrophy and chronic small vessel  ischemic changes.    Sinus disease.    Empty sella.      Electronically signed by: Kitty Ovalles  Date:    08/06/2023  Time:    18:06               Narrative:    EXAMINATION:  CT OF THE HEAD WITHOUT    CLINICAL HISTORY:  Seizure, new-onset, no history of trauma;    TECHNIQUE:  5 mm unenhanced axial images were obtained from the skull base to the vertex.    COMPARISON:  04/26/2023    FINDINGS:  Moderate cerebral atrophy and chronic small vessel ischemic changes are present.  There is no acute intracranial hemorrhage, territorial infarct or mass effect, or midline shift.  The sella is empty.  In the visualized paranasal sinuses, there is an air-fluid level seen in the left maxillary sinus.  There is moderate patchy opacification of bilateral mastoid air cells.  There is mucoperiosteal thickening seen in the left frontal sinus.  Mild mucoperiosteal thickening is seen in the visualized anterior aspects of sphenoid sinuses and the right maxillary sinus.  Hyperostosis frontalis is present.                                       X-Ray Chest AP Portable (Final result)  Result time 08/06/23 16:15:46      Final result by Kishore Mendez MD (08/06/23 16:15:46)                   Impression:      No acute chest disease identified.      Electronically signed by: Kishore Mendez MD  Date:    08/06/2023  Time:    16:15               Narrative:    EXAMINATION:  XR CHEST AP PORTABLE    CLINICAL HISTORY:  Syncope and collapse    TECHNIQUE:  Single frontal view of the chest was performed.    COMPARISON:  08/15/2011.    FINDINGS:  The heart and mediastinal structures appear unremarkable.  Atherosclerotic calcification is present within the aortic arch.  Pulmonary vasculature appears to be within normal limits.  The lungs are free of focal consolidations.  There is no evidence for pneumothorax or large pleural effusions.  Bony structures are grossly intact.                                        Assessment/Plan:     * COVID-19 virus  "detected  Presents with a near syncopal episode and found to have hypoglycemia with EMS. Troponin 0.042. suspect shaking and near syncope related to hypoglycemia. Without hypoxia on RA and chest x-ray without acute process  Will hold starting lovenox, dexamethasone, remdesivir as without hypoxia  Troponin trend/telemetry for near syncope  Hypoglycemia has resolved in ED-suspect caused by decreased PO intake and continued insulin administration by family.   Airborne/contact/droplet isolation  Full code    Acute renal failure superimposed on stage 3 chronic kidney disease  Cr today of 2.5. baseline of 1.2 to 1.5. suspect related to decreased PO intake in setting of COVID.  Started on gentle IVF  Renal dose medications, avoid nephrotoxic drugs, monitor intake and output  Home valsartan/HCTZ held  Was also hypoglycemic on EMS arrival to 48 suspect related to decreased PO Intake, without hypoglycemia in ED      Patient with acute kidney injury/acute renal failure likely due to pre-renal azotemia due to dehydration CHRISTO is currently stable. Baseline creatinine 1.2-1.5 - Labs reviewed- Renal function/electrolytes with Estimated Creatinine Clearance: 15.1 mL/min (A) (based on SCr of 2.5 mg/dL (H)). according to latest data. Monitor urine output and serial BMP and adjust therapy as needed. Avoid nephrotoxins and renally dose meds for GFR listed above.    Seizure disorder  No recent seizure activity per daughter. Continue home carbamazepine check carbatrol level  Seizure precautions    HTN, goal below 140/90  Continue home amlodipine and coreg. Home valsartan/HCTZ held for CHRISTO    Late onset Alzheimer's disease without behavioral disturbance  At baseline disoriented to time and place. Today oriented to self and place. At baseline per daughter  Continue home aricept  Family reports was started on xarelto for "vascular dementia" by PCP, is no longer taking per family    BMI 32.0-32.9,adult  Will recommend diet and lifestyle " modifications outpatient    Diabetes mellitus with renal manifestations, controlled  Patient's FSGs are uncontrolled due to hypoglycemia on current medication regimen.  Last A1c reviewed-   Lab Results   Component Value Date    HGBA1C 5.9 (H) 09/26/2018     Most recent fingerstick glucose reviewed-   Recent Labs   Lab 08/06/23  1517 08/06/23  1530 08/06/23  1639 08/06/23  1911   POCTGLUCOSE 211* 168* 142* 124*     Current correctional scale  insulin held for hypoglycemia  Maintain anti-hyperglycemic dose as follows-   Antihyperglycemics (From admission, onward)    None        Hold Oral hypoglycemics while patient is in the hospital.    Dyslipidemia  Continue home statin      VTE Risk Mitigation (From admission, onward)         Ordered     Place LAKHWINDER hose  Until discontinued         08/06/23 2112     Place sequential compression device  Until discontinued         08/06/23 2112                 Discussed with ED physician.    VTE: LAKHWINDER/SCD  Code: Full  Diet: diabetic  Dispo: pending improvement in CHRISTO  On 08/06/2023, patient should be placed in hospital observation services under my care in collaboration with Alondra Craig MD.      Hero Alvarenga PA-C  Department of Hospital Medicine  [facility] [place] [facility]

## 2023-08-07 NOTE — ASSESSMENT & PLAN NOTE
Presents with a near syncopal episode and found to have hypoglycemia with EMS. Troponin 0.042. suspect shaking and near syncope related to hypoglycemia. Without hypoxia on RA and chest x-ray without acute process  Will hold starting lovenox, dexamethasone, remdesivir as without hypoxia  Troponin trend/telemetry for near syncope  Hypoglycemia has resolved in ED-suspect caused by decreased PO intake and continued insulin administration by family.   Airborne/contact/droplet isolation  Full code

## 2023-08-07 NOTE — HPI
HPI: Jossie Morejon 89 y.o. female with dementia, CKD, HTN HLD, seizure disorder presents to the hospital chief complaint of near-syncope.  The patient has dementia and is unsure what brought her to the hospital and denies any complaints at this time.     For discussion with the patient's daughter she reports today took her mother urgent care to be tested for COVID as she tested positive for COVID and family member in [place] recently tested positive for COVID.  Upon returning home and ambulating from the car back to her home she experienced a near syncopal episode with associated generalized shaking.  She was assisted to the ground by the daughter.  She is at her baseline mental status per the daughter which is disoriented to time and place.  The daughter reports she was given Xarelto by her PCP for possible vascular dementia.  The patient has had decreased p.o. intake for the last week, but the daughter has continued to give her insulin.     In the ED, she was initially hypoglycemic to 48 upon EMS arrival her creatinine is 2.6 troponin 0.042 COVID positive without hypoxia on room air chest x-ray without acute process CT head without acute abnormality, TSH and lactic acid negative.

## 2023-08-07 NOTE — ASSESSMENT & PLAN NOTE
No recent seizure activity per daughter. Continue home carbamazepine check carbatrol level  Seizure precautions

## 2023-08-07 NOTE — ASSESSMENT & PLAN NOTE
"At baseline disoriented to time and place. Today oriented to self and place. At baseline per daughter  Continue home aricept  Family reports was started on xarelto for "vascular dementia" by PCP, is no longer taking per family  "

## 2023-08-07 NOTE — ASSESSMENT & PLAN NOTE
Patient's FSGs are uncontrolled due to hypoglycemia on current medication regimen.  Last A1c reviewed-   Lab Results   Component Value Date    HGBA1C 5.9 (H) 09/26/2018     Most recent fingerstick glucose reviewed-   Recent Labs   Lab 08/06/23  1517 08/06/23  1530 08/06/23  1639 08/06/23  1911   POCTGLUCOSE 211* 168* 142* 124*     Current correctional scale  insulin held for hypoglycemia  Maintain anti-hyperglycemic dose as follows-   Antihyperglycemics (From admission, onward)    None        Hold Oral hypoglycemics while patient is in the hospital.

## 2023-08-08 ENCOUNTER — PES CALL (OUTPATIENT)
Dept: ADMINISTRATIVE | Facility: CLINIC | Age: 88
End: 2023-08-08
Payer: MEDICARE

## 2023-11-06 PROBLEM — N18.30 ACUTE RENAL FAILURE SUPERIMPOSED ON STAGE 3 CHRONIC KIDNEY DISEASE: Status: RESOLVED | Noted: 2023-08-06 | Resolved: 2023-11-06

## 2023-11-06 PROBLEM — N17.9 ACUTE RENAL FAILURE SUPERIMPOSED ON STAGE 3 CHRONIC KIDNEY DISEASE: Status: RESOLVED | Noted: 2023-08-06 | Resolved: 2023-11-06

## 2023-12-17 ENCOUNTER — HOSPITAL ENCOUNTER (EMERGENCY)
Facility: HOSPITAL | Age: 88
Discharge: HOME | End: 2023-12-17
Attending: EMERGENCY MEDICINE
Payer: MEDICARE

## 2023-12-17 VITALS
WEIGHT: 170 LBS | OXYGEN SATURATION: 98 % | HEART RATE: 90 BPM | BODY MASS INDEX: 28.32 KG/M2 | RESPIRATION RATE: 17 BRPM | DIASTOLIC BLOOD PRESSURE: 93 MMHG | HEIGHT: 65 IN | TEMPERATURE: 98 F | SYSTOLIC BLOOD PRESSURE: 190 MMHG

## 2023-12-17 DIAGNOSIS — N28.89 RENAL MASS, LEFT: ICD-10-CM

## 2023-12-17 DIAGNOSIS — R10.9 LEFT FLANK PAIN: Primary | ICD-10-CM

## 2023-12-17 LAB
ALBUMIN SERPL BCP-MCNC: 4 G/DL (ref 3.5–5.2)
ALLENS TEST: ABNORMAL
ALP SERPL-CCNC: 144 U/L (ref 55–135)
ALT SERPL W/O P-5'-P-CCNC: 10 U/L (ref 10–44)
ANION GAP SERPL CALC-SCNC: 12 MMOL/L (ref 8–16)
ANION GAP SERPL CALC-SCNC: 13 MMOL/L (ref 8–16)
AST SERPL-CCNC: 15 U/L (ref 10–40)
BASOPHILS # BLD AUTO: 0.02 K/UL (ref 0–0.2)
BASOPHILS NFR BLD: 0.4 % (ref 0–1.9)
BILIRUB SERPL-MCNC: 0.2 MG/DL (ref 0.1–1)
BILIRUB UR QL STRIP: NEGATIVE
BUN SERPL-MCNC: 25 MG/DL (ref 6–30)
BUN SERPL-MCNC: 26 MG/DL (ref 10–30)
CALCIUM SERPL-MCNC: 9.3 MG/DL (ref 8.7–10.5)
CHLORIDE SERPL-SCNC: 109 MMOL/L (ref 95–110)
CHLORIDE SERPL-SCNC: 110 MMOL/L (ref 95–110)
CLARITY UR: CLEAR
CO2 SERPL-SCNC: 21 MMOL/L (ref 23–29)
COLOR UR: COLORLESS
CREAT SERPL-MCNC: 1.6 MG/DL (ref 0.5–1.4)
CREAT SERPL-MCNC: 1.6 MG/DL (ref 0.5–1.4)
DIFFERENTIAL METHOD: ABNORMAL
EOSINOPHIL # BLD AUTO: 0.1 K/UL (ref 0–0.5)
EOSINOPHIL NFR BLD: 2.1 % (ref 0–8)
ERYTHROCYTE [DISTWIDTH] IN BLOOD BY AUTOMATED COUNT: 17.1 % (ref 11.5–14.5)
EST. GFR  (NO RACE VARIABLE): 30 ML/MIN/1.73 M^2
GLUCOSE SERPL-MCNC: 109 MG/DL (ref 70–110)
GLUCOSE SERPL-MCNC: 112 MG/DL (ref 70–110)
GLUCOSE UR QL STRIP: NEGATIVE
HCT VFR BLD AUTO: 31.1 % (ref 37–48.5)
HCT VFR BLD CALC: 35 %PCV (ref 36–54)
HGB BLD-MCNC: 9.6 G/DL (ref 12–16)
HGB UR QL STRIP: NEGATIVE
IMM GRANULOCYTES # BLD AUTO: 0.02 K/UL (ref 0–0.04)
IMM GRANULOCYTES NFR BLD AUTO: 0.4 % (ref 0–0.5)
KETONES UR QL STRIP: NEGATIVE
LACTATE SERPL-SCNC: 1 MMOL/L (ref 0.5–2.2)
LEUKOCYTE ESTERASE UR QL STRIP: NEGATIVE
LIPASE SERPL-CCNC: 547 U/L (ref 4–60)
LYMPHOCYTES # BLD AUTO: 1.7 K/UL (ref 1–4.8)
LYMPHOCYTES NFR BLD: 35.8 % (ref 18–48)
MCH RBC QN AUTO: 25.4 PG (ref 27–31)
MCHC RBC AUTO-ENTMCNC: 30.9 G/DL (ref 32–36)
MCV RBC AUTO: 82 FL (ref 82–98)
MONOCYTES # BLD AUTO: 0.6 K/UL (ref 0.3–1)
MONOCYTES NFR BLD: 12.2 % (ref 4–15)
NEUTROPHILS # BLD AUTO: 2.3 K/UL (ref 1.8–7.7)
NEUTROPHILS NFR BLD: 49.1 % (ref 38–73)
NITRITE UR QL STRIP: NEGATIVE
NRBC BLD-RTO: 0 /100 WBC
PH UR STRIP: 7 [PH] (ref 5–8)
PLATELET # BLD AUTO: 203 K/UL (ref 150–450)
PMV BLD AUTO: 10.1 FL (ref 9.2–12.9)
POC IONIZED CALCIUM: 1.34 MMOL/L (ref 1.06–1.42)
POC TCO2 (MEASURED): 27 MMOL/L (ref 23–29)
POCT GLUCOSE: 119 MG/DL (ref 70–110)
POTASSIUM BLD-SCNC: 4.1 MMOL/L (ref 3.5–5.1)
POTASSIUM SERPL-SCNC: 4.2 MMOL/L (ref 3.5–5.1)
PROT SERPL-MCNC: 7.8 G/DL (ref 6–8.4)
PROT UR QL STRIP: NEGATIVE
RBC # BLD AUTO: 3.78 M/UL (ref 4–5.4)
SAMPLE: ABNORMAL
SITE: ABNORMAL
SODIUM BLD-SCNC: 143 MMOL/L (ref 136–145)
SODIUM SERPL-SCNC: 143 MMOL/L (ref 136–145)
SP GR UR STRIP: 1.01 (ref 1–1.03)
URN SPEC COLLECT METH UR: ABNORMAL
UROBILINOGEN UR STRIP-ACNC: NEGATIVE EU/DL
WBC # BLD AUTO: 4.77 K/UL (ref 3.9–12.7)

## 2023-12-17 PROCEDURE — 84295 ASSAY OF SERUM SODIUM: CPT

## 2023-12-17 PROCEDURE — 84132 ASSAY OF SERUM POTASSIUM: CPT | Mod: 91

## 2023-12-17 PROCEDURE — 85025 COMPLETE CBC W/AUTO DIFF WBC: CPT | Performed by: EMERGENCY MEDICINE

## 2023-12-17 PROCEDURE — 85014 HEMATOCRIT: CPT

## 2023-12-17 PROCEDURE — 82330 ASSAY OF CALCIUM: CPT

## 2023-12-17 PROCEDURE — 99285 EMERGENCY DEPT VISIT HI MDM: CPT | Mod: 25

## 2023-12-17 PROCEDURE — 25000003 PHARM REV CODE 250: Performed by: EMERGENCY MEDICINE

## 2023-12-17 PROCEDURE — 80053 COMPREHEN METABOLIC PANEL: CPT | Performed by: EMERGENCY MEDICINE

## 2023-12-17 PROCEDURE — 81003 URINALYSIS AUTO W/O SCOPE: CPT | Performed by: EMERGENCY MEDICINE

## 2023-12-17 PROCEDURE — 82565 ASSAY OF CREATININE: CPT

## 2023-12-17 PROCEDURE — 99900035 HC TECH TIME PER 15 MIN (STAT)

## 2023-12-17 PROCEDURE — 83690 ASSAY OF LIPASE: CPT | Performed by: EMERGENCY MEDICINE

## 2023-12-17 PROCEDURE — 83605 ASSAY OF LACTIC ACID: CPT | Performed by: EMERGENCY MEDICINE

## 2023-12-17 PROCEDURE — 96361 HYDRATE IV INFUSION ADD-ON: CPT

## 2023-12-17 PROCEDURE — 96360 HYDRATION IV INFUSION INIT: CPT

## 2023-12-17 RX ORDER — ACETAMINOPHEN 500 MG
500 TABLET ORAL EVERY 6 HOURS PRN
Qty: 30 TABLET | Refills: 0 | Status: SHIPPED | OUTPATIENT
Start: 2023-12-17

## 2023-12-17 RX ORDER — OXYCODONE AND ACETAMINOPHEN 5; 325 MG/1; MG/1
1 TABLET ORAL EVERY 4 HOURS PRN
Qty: 12 TABLET | Refills: 0 | Status: SHIPPED | OUTPATIENT
Start: 2023-12-17 | End: 2023-12-19

## 2023-12-17 RX ORDER — ACETAMINOPHEN 500 MG
1000 TABLET ORAL
Status: COMPLETED | OUTPATIENT
Start: 2023-12-17 | End: 2023-12-17

## 2023-12-17 RX ADMIN — SODIUM CHLORIDE 1000 ML: 9 INJECTION, SOLUTION INTRAVENOUS at 03:12

## 2023-12-17 RX ADMIN — ACETAMINOPHEN 1000 MG: 500 TABLET ORAL at 06:12

## 2023-12-17 NOTE — ED PROVIDER NOTES
"Encounter Date: 12/17/2023    SCRIBE #1 NOTE: I, Amy Jiménez, am scribing for, and in the presence of,  Jessica Valladares DO.       History     Chief Complaint   Patient presents with    Back Pain     Pt reports with cc of back pain since yesterday. Took tylenol last dose this morning with minimal relief.      Jossie Morejon is a 89 y.o. female, with a PMHx of Alzheimer's, CKD, T2DM, HLD, HTN, CVA, who presents to the ED with a chief complaint of left flank pain since yesterday. Entire history is provided by patient's daughter secondary to dementia. Patient's daughter reports patient keeps asking for tylenol at home which brings no relief. Daughter reports patient said became a "tightening" upon arrival to ED. Reports patient is acting normal otherwise. Reports patient had tea and toast PTA. Denies recent falls or injuries. Denies Hx of renal stones. NKDA. Patient lives with daughter. No other exacerbating or alleviating factors. Denies CP, SOB, nausea, vomiting, dysuria, hematuria or other associated symptoms.       The history is provided by a relative. No  was used.     Review of patient's allergies indicates:  No Known Allergies  Past Medical History:   Diagnosis Date    Alzheimer disease     Breast cancer     CKD (chronic kidney disease)     Diabetes mellitus type II     Dyslipidemia     Dyslipidemia     Essential hypertension, benign     History of CVA (cerebrovascular accident)     Hypercholesteremia     Microalbuminuria     Mild anemia     Seizure disorder      No past surgical history on file.  Family History   Problem Relation Age of Onset    Diabetes Mother      Social History     Tobacco Use    Smoking status: Never    Smokeless tobacco: Never   Substance Use Topics    Alcohol use: No    Drug use: No     Review of Systems   Constitutional:  Negative for fever.   HENT:  Negative for rhinorrhea and sore throat.    Eyes:  Negative for redness.   Respiratory:  Negative for shortness of " breath.    Cardiovascular:  Negative for chest pain and leg swelling.   Gastrointestinal:  Negative for abdominal pain, diarrhea, nausea and vomiting.   Genitourinary:  Positive for flank pain. Negative for dysuria and hematuria.   Musculoskeletal:  Negative for back pain.   Skin:  Negative for rash.   Neurological:  Negative for syncope and headaches.   All other systems reviewed and are negative.      Physical Exam     Initial Vitals [12/17/23 1234]   BP Pulse Resp Temp SpO2   (!) 184/81 69 16 98 °F (36.7 °C) 98 %      MAP       --         Physical Exam    Nursing note and vitals reviewed.  Constitutional: She appears well-developed and well-nourished.   HENT:   Head: Normocephalic and atraumatic.   Right Ear: External ear normal.   Left Ear: External ear normal.   Nose: Nose normal.   Mouth/Throat: Oropharynx is clear and moist.   Eyes: Conjunctivae are normal.   Neck: Phonation normal. Neck supple.   Normal range of motion.  Cardiovascular:  Normal rate, regular rhythm, normal heart sounds and intact distal pulses.     Exam reveals no gallop and no friction rub.       No murmur heard.  Pulmonary/Chest: Effort normal and breath sounds normal. No stridor. No respiratory distress. She has no wheezes. She has no rhonchi. She has no rales. She exhibits no tenderness.   Abdominal: Abdomen is soft. Bowel sounds are normal. She exhibits no distension. There is no abdominal tenderness.   No overlying skin changes. No rash.    No right CVA tenderness.  There is left CVA tenderness. There is no rigidity, no rebound and no guarding.   Musculoskeletal:         General: No tenderness or edema. Normal range of motion.      Cervical back: Normal range of motion and neck supple. No bony tenderness.      Thoracic back: No bony tenderness.      Lumbar back: No bony tenderness.     Neurological: She is alert and oriented to person, place, and time. She has normal strength. No cranial nerve deficit or sensory deficit. GCS score is  15. GCS eye subscore is 4. GCS verbal subscore is 5. GCS motor subscore is 6.   Skin: Skin is warm and dry. Capillary refill takes less than 2 seconds. No rash noted.   Psychiatric: She has a normal mood and affect. Her behavior is normal.   Demented.        Patient gave consent to have physical exam performed.      ED Course   Procedures  Labs Reviewed   CBC W/ AUTO DIFFERENTIAL - Abnormal; Notable for the following components:       Result Value    RBC 3.78 (*)     Hemoglobin 9.6 (*)     Hematocrit 31.1 (*)     MCH 25.4 (*)     MCHC 30.9 (*)     RDW 17.1 (*)     All other components within normal limits   COMPREHENSIVE METABOLIC PANEL - Abnormal; Notable for the following components:    CO2 21 (*)     Creatinine 1.6 (*)     Alkaline Phosphatase 144 (*)     eGFR 30 (*)     All other components within normal limits   LIPASE - Abnormal; Notable for the following components:    Lipase 547 (*)     All other components within normal limits   URINALYSIS, REFLEX TO URINE CULTURE - Abnormal; Notable for the following components:    Color, UA Colorless (*)     All other components within normal limits    Narrative:     Specimen Source->Urine   ISTAT PROCEDURE - Abnormal; Notable for the following components:    POC Glucose 112 (*)     POC Creatinine 1.6 (*)     POC Hematocrit 35 (*)     All other components within normal limits   POCT GLUCOSE - Abnormal; Notable for the following components:    POCT Glucose 119 (*)     All other components within normal limits   LACTIC ACID, PLASMA          Imaging Results              CT Abdomen Pelvis  Without Contrast (Final result)  Result time 12/17/23 14:29:56      Final result by Cuauhtemoc Sol MD (12/17/23 14:29:56)                   Impression:      1. No definite acute findings identified in the abdomen or pelvis by unenhanced CT technique.  2. Lobulated hyperintense mass lesion at the lower pole the left kidney measuring up to 42 mm.  Finding concerning for solid renal mass,  for which further characterization with dedicated renal mass protocol MRI or CT would be recommended.  Additionally, there is an indeterminate small hyperattenuating lesion at the lower pole right kidney, also for which further characterization with additional dedicated imaging would be recommended.  3. Mixed lucent and sclerotic lesion involving T12 vertebra, as above.  Finding could represent benign etiology such as an intraosseous hemangioma, however metastasis would not be excluded, given findings in the kidneys above.  Attention on follow-up examinations, preferably MRI.  *Additional details, as provided in the body of report.      Electronically signed by: Cuauhtemoc Sol  Date:    12/17/2023  Time:    14:29               Narrative:    EXAMINATION:  CT ABDOMEN PELVIS WITHOUT CONTRAST    CLINICAL HISTORY:  Flank pain, kidney stone suspected;    TECHNIQUE:  Low dose axial images, sagittal and coronal reformations were obtained from the lung bases to the pubic symphysis.    COMPARISON:  None    FINDINGS:  This examination is limited due to lack of intravenous contrast.    Lower chest: Aortic and mitral valvular calcifications.  Atelectasis and scar at the visualized lung bases.  Calcified granuloma.    Liver: Simple cyst lateral segment left hepatic lobe.    Gallbladder and bile ducts: Unremarkable.    Pancreas: Normal contour.    Spleen: Normal contour.    Adrenals: Normal contour.    Kidneys:    There is at least a mild degree of cortical atrophy of both kidneys.  No definite evidence of radiopaque urolithiasis or obstructive uropathy is open appreciated.    There is a lobulated hyperattenuating exophytic mass lesion at the upper pole of the left kidney measuring on the order of 42 mm.    Additionally, there is an indeterminate hyperattenuating exophytic lesion at the anterior lower pole of the right kidney measuring the order of 13 mm (axial series 2, image 52).  Few additional simple cysts are noted in the  kidneys as well as subcentimeter hypodense lesions, the latter of which are too small to definitely characterize.    Lymph nodes: No abdominal or pelvic lymphadenopathy.    Bowel and mesentery: No evidence of bowel obstruction.  Moderate to large volume colonic stool.  Normal appendix.    Abdominal aorta: Nonaneurysmal.  Heavy atherosclerotic calcification.    Inferior vena cava: Unremarkable.    Free fluid or free air: None.    Pelvis: Unremarkable.    Urinary bladder: Unremarkable.    Body wall: 26 mm lobulated fluid attenuation lesions noted in the right lateral chest wall (axial series 2, image 8).    Bones: Mixed lucent and sclerotic lesion with irregular thickening involving the T12 vertebra, which demonstrates mild cortical irregularity and height loss of the inferior endplate and anterior cortex.  Margins appear well corticated without evidence of acute fracture.  Degenerative findings are noted elsewhere in the visualized spine and at the hips.                                       Medications   sodium chloride 0.9% bolus 1,000 mL 1,000 mL (0 mLs Intravenous Stopped 12/17/23 1655)   acetaminophen tablet 1,000 mg (1,000 mg Oral Given 12/17/23 1848)     Medical Decision Making  Amount and/or Complexity of Data Reviewed  Independent Historian:      Details: Daughter. See HPI  External Data Reviewed: ECG.     Details: See ED course  Labs: ordered. Decision-making details documented in ED Course.  Radiology: ordered. Decision-making details documented in ED Course.  ECG/medicine tests: ordered and independent interpretation performed. Decision-making details documented in ED Course.     Details: EKGs independently interpreted by Dr. Valladares reads: see ED course. External documents reviewed for previous EKG comparison: see ED course.       Risk  OTC drugs.  Prescription drug management.    Medical Decision Making:    This is an evaluation of a 89 y.o. female that presents to the Emergency Department for L flank  pain  Chief Complaint   Patient presents with    Back Pain     Pt reports with cc of back pain since yesterday. Took tylenol last dose this morning with minimal relief.        Independent historian: daughter at bedside provides HPI as patient has dementia.  Patient is also on hospice.  Family wants to know why she is complaining of flank pain and keeps asking for medicine.    The patient is a non-toxic and well appearing patient. On physical exam, patient appears well hydrated with moist mucus membranes. Breath sounds are clear and equal bilaterally with no adventitious breath sounds, tachypnea or respiratory distress. Regular rate and rhythm. No murmurs. Abdomen soft and non tender. Left CVA tenderness. Patient is tolerating PO without difficulty. Physical exam otherwise as above.     I have reviewed vital signs and nursing notes.   Vital Signs Are Reassuring.     Based on the patient's symptoms, I am considering and evaluating for the following differential diagnoses: UTI, renal stone, hyperglycemia, flank pain, DKA, HTN, uncontrolled HTN    Consider hospitalization for: flank pain    Patient is NOT agreeable to admission as she is on hospice.    ED Course:Treatment in the ED included Physical Exam and medications given in ED  Medications   sodium chloride 0.9% bolus 1,000 mL 1,000 mL (0 mLs Intravenous Stopped 12/17/23 1655)   acetaminophen tablet 1,000 mg (1,000 mg Oral Given 12/17/23 1848)   .   Patient reports feeling better after treatment in the ER.       External Data/Documents Reviewed: Previous medical records and vital signs reviewed, see HPI and Physical exam.   Labs: ordered and reviewed. WBC 4.77.  Radiology: ordered as indicated and reviewed. Renal mass      Risk  Diagnosis or treatment significantly limited by the following social determinants of health: Body mass index is 28.29 kg/m².     In shared decision making with the patient, we discussed treatment, prescriptions, labs, and imaging  results.    Discharge home with   ED Prescriptions       Medication Sig Dispense Start Date End Date Auth. Provider    acetaminophen (TYLENOL) 500 MG tablet Take 1 tablet (500 mg total) by mouth every 6 (six) hours as needed for Pain (As needed for mild-to-moderate pain). 30 tablet 12/17/2023 -- Jessica Valladares DO    oxyCODONE-acetaminophen (PERCOCET) 5-325 mg per tablet Take 1 tablet by mouth every 4 (four) hours as needed for Pain (As needed for severe 10/10 pain). 12 tablet 12/17/2023 12/19/2023 Jessica Valladares DO          Fill and take prescriptions as directed.  Return to the ED if symptoms worsen or do not resolve.   Answered questions and discussed discharge plan.    Patient feels better and is ready for discharge.  Follow up with PCP/specialist in 1 day        At time of discharge patient is awake alert  and moving all 4 extremities.     The following labs and imaging were reviewed:        Admission on 12/17/2023, Discharged on 12/17/2023   Component Date Value Ref Range Status    WBC 12/17/2023 4.77  3.90 - 12.70 K/uL Final    RBC 12/17/2023 3.78 (L)  4.00 - 5.40 M/uL Final    Hemoglobin 12/17/2023 9.6 (L)  12.0 - 16.0 g/dL Final    Hematocrit 12/17/2023 31.1 (L)  37.0 - 48.5 % Final    MCV 12/17/2023 82  82 - 98 fL Final    MCH 12/17/2023 25.4 (L)  27.0 - 31.0 pg Final    MCHC 12/17/2023 30.9 (L)  32.0 - 36.0 g/dL Final    RDW 12/17/2023 17.1 (H)  11.5 - 14.5 % Final    Platelets 12/17/2023 203  150 - 450 K/uL Final    MPV 12/17/2023 10.1  9.2 - 12.9 fL Final    Immature Granulocytes 12/17/2023 0.4  0.0 - 0.5 % Final    Gran # (ANC) 12/17/2023 2.3  1.8 - 7.7 K/uL Final    Immature Grans (Abs) 12/17/2023 0.02  0.00 - 0.04 K/uL Final    Comment: Mild elevation in immature granulocytes is non specific and   can be seen in a variety of conditions including stress response,   acute inflammation, trauma and pregnancy. Correlation with other   laboratory and clinical findings is essential.      Lymph # 12/17/2023 1.7   1.0 - 4.8 K/uL Final    Mono # 12/17/2023 0.6  0.3 - 1.0 K/uL Final    Eos # 12/17/2023 0.1  0.0 - 0.5 K/uL Final    Baso # 12/17/2023 0.02  0.00 - 0.20 K/uL Final    nRBC 12/17/2023 0  0 /100 WBC Final    Gran % 12/17/2023 49.1  38.0 - 73.0 % Final    Lymph % 12/17/2023 35.8  18.0 - 48.0 % Final    Mono % 12/17/2023 12.2  4.0 - 15.0 % Final    Eosinophil % 12/17/2023 2.1  0.0 - 8.0 % Final    Basophil % 12/17/2023 0.4  0.0 - 1.9 % Final    Differential Method 12/17/2023 Automated   Final    Sodium 12/17/2023 143  136 - 145 mmol/L Final    Potassium 12/17/2023 4.2  3.5 - 5.1 mmol/L Final    Chloride 12/17/2023 110  95 - 110 mmol/L Final    CO2 12/17/2023 21 (L)  23 - 29 mmol/L Final    Glucose 12/17/2023 109  70 - 110 mg/dL Final    BUN 12/17/2023 26  10 - 30 mg/dL Final    Creatinine 12/17/2023 1.6 (H)  0.5 - 1.4 mg/dL Final    Calcium 12/17/2023 9.3  8.7 - 10.5 mg/dL Final    Total Protein 12/17/2023 7.8  6.0 - 8.4 g/dL Final    Albumin 12/17/2023 4.0  3.5 - 5.2 g/dL Final    Total Bilirubin 12/17/2023 0.2  0.1 - 1.0 mg/dL Final    Comment: For infants and newborns, interpretation of results should be based  on gestational age, weight and in agreement with clinical  observations.    Premature Infant recommended reference ranges:  Up to 24 hours.............<8.0 mg/dL  Up to 48 hours............<12.0 mg/dL  3-5 days..................<15.0 mg/dL  6-29 days.................<15.0 mg/dL      Alkaline Phosphatase 12/17/2023 144 (H)  55 - 135 U/L Final    AST 12/17/2023 15  10 - 40 U/L Final    ALT 12/17/2023 10  10 - 44 U/L Final    eGFR 12/17/2023 30 (A)  >60 mL/min/1.73 m^2 Final    Anion Gap 12/17/2023 12  8 - 16 mmol/L Final    Lipase 12/17/2023 547 (H)  4 - 60 U/L Final    Specimen UA 12/17/2023 Urine, Catheterized   Final    Color, UA 12/17/2023 Colorless (A)  Yellow, Straw, Joslyn Final    Appearance, UA 12/17/2023 Clear  Clear Final    pH, UA 12/17/2023 7.0  5.0 - 8.0 Final    Specific Gravity, UA 12/17/2023  1.010  1.005 - 1.030 Final    Protein, UA 12/17/2023 Negative  Negative Final    Comment: Recommend a 24 hour urine protein or a urine   protein/creatinine ratio if globulin induced proteinuria is  clinically suspected.      Glucose, UA 12/17/2023 Negative  Negative Final    Ketones, UA 12/17/2023 Negative  Negative Final    Bilirubin (UA) 12/17/2023 Negative  Negative Final    Occult Blood UA 12/17/2023 Negative  Negative Final    Nitrite, UA 12/17/2023 Negative  Negative Final    Urobilinogen, UA 12/17/2023 Negative  <2.0 EU/dL Final    Leukocytes, UA 12/17/2023 Negative  Negative Final    Lactate (Lactic Acid) 12/17/2023 1.0  0.5 - 2.2 mmol/L Final    Comment: Falsely low lactic acid results can be found in samples   containing >=13.0 mg/dL total bilirubin and/or >=3.5 mg/dL   direct bilirubin.      POC Glucose 12/17/2023 112 (H)  70 - 110 mg/dL Final    POC BUN 12/17/2023 25  6 - 30 mg/dL Final    POC Creatinine 12/17/2023 1.6 (H)  0.5 - 1.4 mg/dL Final    POC Sodium 12/17/2023 143  136 - 145 mmol/L Final    POC Potassium 12/17/2023 4.1  3.5 - 5.1 mmol/L Final    POC Chloride 12/17/2023 109  95 - 110 mmol/L Final    POC TCO2 (MEASURED) 12/17/2023 27  23 - 29 mmol/L Final    POC Anion Gap 12/17/2023 13  8 - 16 mmol/L Final    POC Ionized Calcium 12/17/2023 1.34  1.06 - 1.42 mmol/L Final    POC Hematocrit 12/17/2023 35 (L)  36 - 54 %PCV Final    Sample 12/17/2023 VENOUS   Final    Site 12/17/2023 Other   Final    Allens Test 12/17/2023 N/A   Final    POCT Glucose 12/17/2023 119 (H)  70 - 110 mg/dL Final        Imaging Results              CT Abdomen Pelvis  Without Contrast (Final result)  Result time 12/17/23 14:29:56      Final result by Cuauhtemoc Sol MD (12/17/23 14:29:56)                   Impression:      1. No definite acute findings identified in the abdomen or pelvis by unenhanced CT technique.  2. Lobulated hyperintense mass lesion at the lower pole the left kidney measuring up to 42 mm.  Finding  concerning for solid renal mass, for which further characterization with dedicated renal mass protocol MRI or CT would be recommended.  Additionally, there is an indeterminate small hyperattenuating lesion at the lower pole right kidney, also for which further characterization with additional dedicated imaging would be recommended.  3. Mixed lucent and sclerotic lesion involving T12 vertebra, as above.  Finding could represent benign etiology such as an intraosseous hemangioma, however metastasis would not be excluded, given findings in the kidneys above.  Attention on follow-up examinations, preferably MRI.  *Additional details, as provided in the body of report.      Electronically signed by: Cuauhtemoc Sol  Date:    12/17/2023  Time:    14:29               Narrative:    EXAMINATION:  CT ABDOMEN PELVIS WITHOUT CONTRAST    CLINICAL HISTORY:  Flank pain, kidney stone suspected;    TECHNIQUE:  Low dose axial images, sagittal and coronal reformations were obtained from the lung bases to the pubic symphysis.    COMPARISON:  None    FINDINGS:  This examination is limited due to lack of intravenous contrast.    Lower chest: Aortic and mitral valvular calcifications.  Atelectasis and scar at the visualized lung bases.  Calcified granuloma.    Liver: Simple cyst lateral segment left hepatic lobe.    Gallbladder and bile ducts: Unremarkable.    Pancreas: Normal contour.    Spleen: Normal contour.    Adrenals: Normal contour.    Kidneys:    There is at least a mild degree of cortical atrophy of both kidneys.  No definite evidence of radiopaque urolithiasis or obstructive uropathy is open appreciated.    There is a lobulated hyperattenuating exophytic mass lesion at the upper pole of the left kidney measuring on the order of 42 mm.    Additionally, there is an indeterminate hyperattenuating exophytic lesion at the anterior lower pole of the right kidney measuring the order of 13 mm (axial series 2, image 52).  Few additional  simple cysts are noted in the kidneys as well as subcentimeter hypodense lesions, the latter of which are too small to definitely characterize.    Lymph nodes: No abdominal or pelvic lymphadenopathy.    Bowel and mesentery: No evidence of bowel obstruction.  Moderate to large volume colonic stool.  Normal appendix.    Abdominal aorta: Nonaneurysmal.  Heavy atherosclerotic calcification.    Inferior vena cava: Unremarkable.    Free fluid or free air: None.    Pelvis: Unremarkable.    Urinary bladder: Unremarkable.    Body wall: 26 mm lobulated fluid attenuation lesions noted in the right lateral chest wall (axial series 2, image 8).    Bones: Mixed lucent and sclerotic lesion with irregular thickening involving the T12 vertebra, which demonstrates mild cortical irregularity and height loss of the inferior endplate and anterior cortex.  Margins appear well corticated without evidence of acute fracture.  Degenerative findings are noted elsewhere in the visualized spine and at the hips.                                            Scribe Attestation:   Scribe #1: I performed the above scribed service and the documentation accurately describes the services I performed. I attest to the accuracy of the note.                          I, Dr. Jessica Valladares, personally performed the services described in this documentation. This document was produced by a scribe under my direction and in my presence. All medical record entries made by the scribe were at my direction and in my presence.  I have reviewed the chart and agree that the record reflects my personal performance and is accurate and complete. Jessica Valladares, DO.     12/18/2023 5:53 PM        Clinical Impression:  Final diagnoses:  [R10.9] Left flank pain (Primary)  [N28.89] Renal mass, left          ED Disposition Condition    Discharge Stable          ED Prescriptions       Medication Sig Dispense Start Date End Date Auth. Provider    acetaminophen (TYLENOL) 500  MG tablet Take 1 tablet (500 mg total) by mouth every 6 (six) hours as needed for Pain (As needed for mild-to-moderate pain). 30 tablet 12/17/2023 -- Jessica Valladares DO    oxyCODONE-acetaminophen (PERCOCET) 5-325 mg per tablet Take 1 tablet by mouth every 4 (four) hours as needed for Pain (As needed for severe 10/10 pain). 12 tablet 12/17/2023 12/19/2023 Jessica Valladares DO          Follow-up Information       Follow up With Specialties Details Why Contact Info    Sammy Woody MD Family Medicine Schedule an appointment as soon as possible for a visit in 1 day  [address] [place] [address]  [phone]      [facility] [place] - Emergency Dept Emergency Medicine Go to  Please go to [facility] emergency department if symptoms worsen [address]  [facility] [place] [address] [phone]             Jessica Valladares DO  12/18/23 9627

## 2024-02-09 ENCOUNTER — HOSPITAL ENCOUNTER (EMERGENCY)
Facility: HOSPITAL | Age: 89
Discharge: HOME | End: 2024-02-09
Attending: STUDENT IN AN ORGANIZED HEALTH CARE EDUCATION/TRAINING PROGRAM
Payer: MEDICARE

## 2024-02-09 VITALS
HEIGHT: 65 IN | BODY MASS INDEX: 25.83 KG/M2 | SYSTOLIC BLOOD PRESSURE: 168 MMHG | WEIGHT: 155 LBS | HEART RATE: 88 BPM | TEMPERATURE: 98 F | OXYGEN SATURATION: 94 % | DIASTOLIC BLOOD PRESSURE: 72 MMHG | RESPIRATION RATE: 18 BRPM

## 2024-02-09 DIAGNOSIS — R60.9 SWELLING: ICD-10-CM

## 2024-02-09 DIAGNOSIS — N18.9 CHRONIC KIDNEY DISEASE, UNSPECIFIED CKD STAGE: ICD-10-CM

## 2024-02-09 DIAGNOSIS — D64.9 ANEMIA, UNSPECIFIED TYPE: ICD-10-CM

## 2024-02-09 DIAGNOSIS — I82.491 ACUTE DEEP VEIN THROMBOSIS (DVT) OF OTHER SPECIFIED VEIN OF RIGHT LOWER EXTREMITY: Primary | ICD-10-CM

## 2024-02-09 LAB
ALBUMIN SERPL BCP-MCNC: 3.7 G/DL (ref 3.5–5.2)
ALP SERPL-CCNC: 175 U/L (ref 55–135)
ALT SERPL W/O P-5'-P-CCNC: 11 U/L (ref 10–44)
ANION GAP SERPL CALC-SCNC: 12 MMOL/L (ref 8–16)
APTT PPP: 25.4 SEC (ref 21–32)
AST SERPL-CCNC: 12 U/L (ref 10–40)
BASOPHILS # BLD AUTO: 0.02 K/UL (ref 0–0.2)
BASOPHILS NFR BLD: 0.3 % (ref 0–1.9)
BILIRUB SERPL-MCNC: 0.2 MG/DL (ref 0.1–1)
BUN SERPL-MCNC: 35 MG/DL (ref 10–30)
CALCIUM SERPL-MCNC: 9.1 MG/DL (ref 8.7–10.5)
CHLORIDE SERPL-SCNC: 108 MMOL/L (ref 95–110)
CO2 SERPL-SCNC: 24 MMOL/L (ref 23–29)
CREAT SERPL-MCNC: 1.7 MG/DL (ref 0.5–1.4)
DIFFERENTIAL METHOD BLD: ABNORMAL
EOSINOPHIL # BLD AUTO: 0.1 K/UL (ref 0–0.5)
EOSINOPHIL NFR BLD: 1.6 % (ref 0–8)
ERYTHROCYTE [DISTWIDTH] IN BLOOD BY AUTOMATED COUNT: 18.4 % (ref 11.5–14.5)
EST. GFR  (NO RACE VARIABLE): 28 ML/MIN/1.73 M^2
GLUCOSE SERPL-MCNC: 120 MG/DL (ref 70–110)
HCT VFR BLD AUTO: 27 % (ref 37–48.5)
HGB BLD-MCNC: 8.3 G/DL (ref 12–16)
IMM GRANULOCYTES # BLD AUTO: 0.05 K/UL (ref 0–0.04)
IMM GRANULOCYTES NFR BLD AUTO: 0.8 % (ref 0–0.5)
INR PPP: 1 (ref 0.8–1.2)
LYMPHOCYTES # BLD AUTO: 1.5 K/UL (ref 1–4.8)
LYMPHOCYTES NFR BLD: 23.9 % (ref 18–48)
MCH RBC QN AUTO: 25.5 PG (ref 27–31)
MCHC RBC AUTO-ENTMCNC: 30.7 G/DL (ref 32–36)
MCV RBC AUTO: 83 FL (ref 82–98)
MONOCYTES # BLD AUTO: 0.7 K/UL (ref 0.3–1)
MONOCYTES NFR BLD: 11 % (ref 4–15)
NEUTROPHILS # BLD AUTO: 3.9 K/UL (ref 1.8–7.7)
NEUTROPHILS NFR BLD: 62.4 % (ref 38–73)
NRBC BLD-RTO: 0 /100 WBC
PLATELET # BLD AUTO: 210 K/UL (ref 150–450)
PMV BLD AUTO: 9.4 FL (ref 9.2–12.9)
POTASSIUM SERPL-SCNC: 4.1 MMOL/L (ref 3.5–5.1)
PROT SERPL-MCNC: 7.4 G/DL (ref 6–8.4)
PROTHROMBIN TIME: 10.8 SEC (ref 9–12.5)
RBC # BLD AUTO: 3.25 M/UL (ref 4–5.4)
SODIUM SERPL-SCNC: 144 MMOL/L (ref 136–145)
WBC # BLD AUTO: 6.19 K/UL (ref 3.9–12.7)

## 2024-02-09 PROCEDURE — 85025 COMPLETE CBC W/AUTO DIFF WBC: CPT | Performed by: EMERGENCY MEDICINE

## 2024-02-09 PROCEDURE — 99284 EMERGENCY DEPT VISIT MOD MDM: CPT | Mod: 25

## 2024-02-09 PROCEDURE — 80053 COMPREHEN METABOLIC PANEL: CPT | Performed by: EMERGENCY MEDICINE

## 2024-02-09 PROCEDURE — 25000003 PHARM REV CODE 250: Performed by: EMERGENCY MEDICINE

## 2024-02-09 PROCEDURE — 85610 PROTHROMBIN TIME: CPT | Performed by: EMERGENCY MEDICINE

## 2024-02-09 PROCEDURE — 85730 THROMBOPLASTIN TIME PARTIAL: CPT | Performed by: EMERGENCY MEDICINE

## 2024-02-09 RX ORDER — CARVEDILOL 3.12 MG/1
3.12 TABLET ORAL
Status: COMPLETED | OUTPATIENT
Start: 2024-02-09 | End: 2024-02-09

## 2024-02-09 RX ADMIN — CARVEDILOL 3.12 MG: 3.12 TABLET, FILM COATED ORAL at 07:02

## 2024-02-09 NOTE — ED PROVIDER NOTES
Encounter Date: 2/9/2024       History     Chief Complaint   Patient presents with    Leg Swelling     Pt had US done at home with reading for + DVT in right leg. Pt instructed to ER for evaluation and assessment      89-year-old female who presents with right lower extremity swelling reportedly says that she has a blood clot in her leg.  She says that home hospice came by and did an ultrasound at her house showing a clot in her leg.  She said swelling in her leg for the last few days and is mildly erythematous.  She denies any pain.  No shortness a breath or chest pain.  No other complaints.  She presents with her daughter.      Review of patient's allergies indicates:  No Known Allergies  Past Medical History:   Diagnosis Date    Alzheimer disease     Breast cancer     CKD (chronic kidney disease)     Diabetes mellitus type II     Dyslipidemia     Dyslipidemia     Essential hypertension, benign     History of CVA (cerebrovascular accident)     Hypercholesteremia     Microalbuminuria     Mild anemia     Seizure disorder      No past surgical history on file.  Family History   Problem Relation Age of Onset    Diabetes Mother      Social History     Tobacco Use    Smoking status: Never    Smokeless tobacco: Never   Substance Use Topics    Alcohol use: No    Drug use: No     Review of Systems    Physical Exam     Initial Vitals [02/09/24 1453]   BP Pulse Resp Temp SpO2   (!) 155/73 77 18 98 °F (36.7 °C) 99 %      MAP       --         Physical Exam    Nursing note and vitals reviewed.  Constitutional: She appears well-developed and well-nourished. She is not diaphoretic. No distress.   HENT:   Head: Normocephalic and atraumatic.   Eyes: EOM are normal. Pupils are equal, round, and reactive to light.   Neck: Trachea normal and phonation normal. Neck supple. Carotid bruit is not present. No JVD present.   Normal range of motion.  Cardiovascular:  Normal rate, regular rhythm, normal heart sounds and intact distal pulses.      Exam reveals no gallop and no friction rub.       No murmur heard.  Pulmonary/Chest: Breath sounds normal. No stridor. No respiratory distress. She has no wheezes. She has no rhonchi. She has no rales. She exhibits no tenderness.   Abdominal: Abdomen is soft. Bowel sounds are normal. She exhibits no distension and no mass. There is no abdominal tenderness. There is no rebound and no guarding.   Musculoskeletal:         General: Edema present.      Cervical back: Normal range of motion and neck supple.      Right lower leg: No swelling. No edema.      Left lower leg: No swelling. No edema.      Comments: Right lower extremity is asymmetrically swollen below-the-knee when compared to the left.  Neurovascularly intact distally.     Neurological: She is alert and oriented to person, place, and time. GCS score is 15. GCS eye subscore is 4. GCS verbal subscore is 5. GCS motor subscore is 6.   Skin: Skin is warm and dry. Capillary refill takes less than 2 seconds.   Psychiatric: She has a normal mood and affect. Thought content normal.         ED Course   Procedures  Labs Reviewed   CBC W/ AUTO DIFFERENTIAL - Abnormal; Notable for the following components:       Result Value    RBC 3.25 (*)     Hemoglobin 8.3 (*)     Hematocrit 27.0 (*)     MCH 25.5 (*)     MCHC 30.7 (*)     RDW 18.4 (*)     Immature Granulocytes 0.8 (*)     Immature Grans (Abs) 0.05 (*)     All other components within normal limits   COMPREHENSIVE METABOLIC PANEL - Abnormal; Notable for the following components:    Glucose 120 (*)     BUN 35 (*)     Creatinine 1.7 (*)     Alkaline Phosphatase 175 (*)     eGFR 28 (*)     All other components within normal limits   APTT   PROTIME-INR          Imaging Results               US Lower Extremity Veins Right (Final result)  Result time 02/09/24 16:30:13      Final result by Ar Pool MD (02/09/24 16:30:13)                   Impression:      Occlusive DVT in the distal right external iliac vein, common  femoral vein, profunda femoral vein and femoral vein.  Follow-up recommended.    Intraluminal thrombus in the right greater saphenous vein also.  Follow-up recommended.    This report was flagged in Epic as abnormal.      Electronically signed by: Ar Lincoln  Date:    02/09/2024  Time:    16:30               Narrative:    EXAMINATION:  US LOWER EXTREMITY VEINS RIGHT    CLINICAL HISTORY:  Edema, unspecified    TECHNIQUE:  Duplex and color flow Doppler evaluation and graded compression of the right lower extremity veins was performed.    COMPARISON:  None    FINDINGS:  Right thigh veins: Intraluminal thrombus in the distal external iliac vein, right common femoral vein, profundus femoral vein, femoral vein and right greater saphenous vein.    Right calf veins: The visualized calf veins are patent.    Contralateral CFV: The contralateral (left) common femoral vein is patent and free of thrombus.    Miscellaneous: None                                       Medications   carvediloL tablet 3.125 mg (3.125 mg Oral Given 2/9/24 1918)     Medical Decision Making  Hemodynamically stable. Afebrile. Phonating and protecting the airway spontaneously. No clinical evidence for cardiovascular instability or impending airway compromise. Examination as above. Additional historians include family at bedside. Prior medical records reviewed. Prior PMD note reviewed, no notes regarding AC/DAPT. Confirmed with pt and family - she is not on any blood thinners. Current co-morbidities considered that will impact clinical decision making include as above.    Plan:  DVT US. If positive, pt and family would like her treated for DVT. Explained risks and benefits, including ambulation concern. They acknowledged and verbalized agreement.       Amount and/or Complexity of Data Reviewed  Labs: ordered.    Risk  Prescription drug management.               ED Course as of 02/10/24 0601   Fri Feb 09, 2024   1745 Called and LVM for patient's  daughter and decision maker Josise Ornelas.  [LH]      ED Course User Index  [LH] Wendie Richardson MD             Signed out to follow up on DVT imaging. If positive, pt would like to be tx.               Clinical Impression:  Final diagnoses:  [R60.9] Swelling  [I82.491] Acute deep vein thrombosis (DVT) of other specified vein of right lower extremity (Primary)  [D64.9] Anemia, unspecified type  [N18.9] Chronic kidney disease, unspecified CKD stage          ED Disposition Condition    Discharge Stable          ED Prescriptions       Medication Sig Dispense Start Date End Date Auth. Provider    apixaban (ELIQUIS) 5 mg (74 tabs) DsPk For the first 7 days take two 5 mg tablets twice daily.  After 7 days take one 5 mg tablet twice daily. 76 tablet 2/9/2024 -- Wendie Richardson MD          Follow-up Information       Follow up With Specialties Details Why Contact Info    Sammy Woody MD Family Medicine Schedule an appointment as soon as possible for a visit on 2/13/2024 To recheck your symptoms [address] [facility]  [place] [address]  [phone]      [facility] [place] - Emergency Dept Emergency Medicine  As needed, If symptoms worsen [address] [facility] [place] [id number] [phone]             Eddie Herrera MD  02/09/24 1539       Eddie Herrera MD  02/10/24 0601

## 2024-02-09 NOTE — ED TRIAGE NOTES
"Pt to ED via POV with complaints of R lower extremity swelling/redness. Pt explains that she believes she has a blood clot in the R leg. States home hospice came to her house and did an ultrasound that showed a clot. Pt explains the swelling/redness has been present for the last few days. Pt states "its a little sore but that's it." Denies any chest pain, SOB. Reports currently living with daughter, still ambulates. Denies use of blood thinners.   "

## 2024-02-10 NOTE — ED PROVIDER NOTES
Nicollet of Care Note:    I assumed care of this patient at shift change from Dr. Herrera pending DVT study. Briefly, this is a 89 year old female who presented today with Right leg swelling since Wednesday. Work up was initiated with DVT study. Labs and imaging significant for Cr of 1.7, anemia, occlusive DVT of the RLE. On my assessment, patient is smiling, no apparent distress.  Her right leg is swollen, she is moving the right leg actively, 1+ DP pulse present.    Discussed management options with patient's daughter and decision maker, she declines any invasive type procedures (we discussed blood thinners, thrombectomy). We discussed risks of blood thinners, she would like to proceed with Rx. I reviewed blood thinner precautions. The patient had Xarelto listed in her medication list, the daughter confirms she received a prescription for it but never started taking it.     Wendie Richardson MD   6:29 PM       Wendie Richardson MD  02/09/24 3236
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
## 2024-06-09 DIAGNOSIS — G40.909 SEIZURE DISORDER: ICD-10-CM

## 2024-06-10 DIAGNOSIS — I10 ESSENTIAL HYPERTENSION, BENIGN: ICD-10-CM

## 2024-06-10 RX ORDER — AMLODIPINE BESYLATE 10 MG/1
10 TABLET ORAL
Qty: 90 TABLET | Refills: 3 | Status: SHIPPED | OUTPATIENT
Start: 2024-06-10

## 2024-06-10 RX ORDER — CARBAMAZEPINE 200 MG/1
200 CAPSULE, EXTENDED RELEASE ORAL 2 TIMES DAILY
Qty: 180 CAPSULE | Refills: 4 | Status: SHIPPED | OUTPATIENT
Start: 2024-06-10

## 2024-06-10 NOTE — TELEPHONE ENCOUNTER
Care Due:                  Date            Visit Type   Department     Provider  --------------------------------------------------------------------------------                                 -         [facility] FAMILY                              PRIMARY      MEDICINE/INTERN  Last Visit: 04-      CARE ([facility])   [facility]         Sammy Woody  Next Visit: None Scheduled  None         None Found                                                            Last  Test          Frequency    Reason                     Performed    Due Date  --------------------------------------------------------------------------------    Office Visit  15 months..  amLODIPine, carvediloL,    04- 06-                             pravastatin..............    Lipid Panel.  12 months..  pravastatin..............  09- 09-    [facility] Embedded Care Due Messages. Reference number: [id number].   6/10/2024 3:55:45 AM CDT

## 2024-08-06 ENCOUNTER — PATIENT OUTREACH (OUTPATIENT)
Dept: ADMINISTRATIVE | Facility: CLINIC | Age: 89
End: 2024-08-06
Payer: MEDICARE

## 2024-08-07 DIAGNOSIS — D64.9 ANEMIA, UNSPECIFIED TYPE: Primary | ICD-10-CM

## 2024-09-30 ENCOUNTER — OFFICE VISIT (OUTPATIENT)
Dept: CARDIOLOGY | Facility: CLINIC | Age: 89
End: 2024-09-30
Payer: MEDICARE

## 2024-09-30 VITALS
BODY MASS INDEX: 25.83 KG/M2 | RESPIRATION RATE: 18 BRPM | HEIGHT: 65 IN | SYSTOLIC BLOOD PRESSURE: 138 MMHG | WEIGHT: 155 LBS | DIASTOLIC BLOOD PRESSURE: 60 MMHG | OXYGEN SATURATION: 97 % | HEART RATE: 61 BPM

## 2024-09-30 DIAGNOSIS — I35.0 AORTIC VALVE STENOSIS, ETIOLOGY OF CARDIAC VALVE DISEASE UNSPECIFIED: ICD-10-CM

## 2024-09-30 DIAGNOSIS — E78.2 MIXED HYPERLIPIDEMIA: ICD-10-CM

## 2024-09-30 DIAGNOSIS — I10 PRIMARY HYPERTENSION: ICD-10-CM

## 2024-09-30 DIAGNOSIS — I10 ESSENTIAL HYPERTENSION: ICD-10-CM

## 2024-09-30 DIAGNOSIS — N18.4 CKD (CHRONIC KIDNEY DISEASE) STAGE 4, GFR 15-29 ML/MIN: ICD-10-CM

## 2024-09-30 DIAGNOSIS — I82.411 DEEP VEIN THROMBOSIS (DVT) OF FEMORAL VEIN OF RIGHT LOWER EXTREMITY, UNSPECIFIED CHRONICITY: Primary | ICD-10-CM

## 2024-09-30 PROCEDURE — 93005 ELECTROCARDIOGRAM TRACING: CPT | Mod: PBBFAC | Performed by: INTERNAL MEDICINE

## 2024-09-30 PROCEDURE — 99999 PR PBB SHADOW E&M-EST. PATIENT-LVL III: CPT | Mod: PBBFAC,,, | Performed by: INTERNAL MEDICINE

## 2024-09-30 PROCEDURE — G2211 COMPLEX E/M VISIT ADD ON: HCPCS | Mod: S$PBB,,, | Performed by: INTERNAL MEDICINE

## 2024-09-30 PROCEDURE — 93010 ELECTROCARDIOGRAM REPORT: CPT | Mod: S$PBB,,, | Performed by: INTERNAL MEDICINE

## 2024-09-30 PROCEDURE — 99213 OFFICE O/P EST LOW 20 MIN: CPT | Mod: PBBFAC | Performed by: INTERNAL MEDICINE

## 2024-09-30 PROCEDURE — 99204 OFFICE O/P NEW MOD 45 MIN: CPT | Mod: S$PBB,,, | Performed by: INTERNAL MEDICINE

## 2024-09-30 NOTE — PROGRESS NOTES
CARDIOVASCULAR CONSULTATION    REASON FOR CONSULT:   Jossie Morejon is a 89 y.o. female who presents for aortic murmur, hx RLE DVT.    PCP: Bong  [facility]: [facility] ([facility])  HISTORY OF PRESENT ILLNESS:   The patient is a pleasant 89-year-old woman with dementia who resides at [facility].  Cardiology consultation is requested for history of DVT.  She apparently is on Eliquis, although this is not listed on her medication list which she brings with her from the nursing home.  She denies any angina, dyspnea, palpitations, or syncope.  There has been no PND, orthopnea, melena, hematuria, or claudication symptoms.  She presents in a wheelchair.      Family history appears to be negative for premature CAD.      The patient is a nonsmoker.  She resides at [facility].    CARDIOVASCULAR HISTORY:   RLE DVT 2/2024 ?on eliquis    PAST MEDICAL HISTORY:     Past Medical History:   Diagnosis Date    Alzheimer disease     Breast cancer     CKD (chronic kidney disease)     Diabetes mellitus type II     Dyslipidemia     Dyslipidemia     Essential hypertension, benign     History of CVA (cerebrovascular accident)     Hypercholesteremia     Microalbuminuria     Mild anemia     Seizure disorder        PAST SURGICAL HISTORY:   History reviewed. No pertinent surgical history.    ALLERGIES AND MEDICATION:   Review of patient's allergies indicates:  No Known Allergies     Medication List            Accurate as of September 30, 2024 11:21 AM. If you have any questions, ask your nurse or doctor.                CONTINUE taking these medications      acetaminophen 500 MG tablet  Commonly known as: TYLENOL  Take 1 tablet (500 mg total) by mouth every 6 (six) hours as needed for Pain (As needed for mild-to-moderate pain).     amLODIPine 10 MG tablet  Commonly known as: NORVASC  TAKE 1 TABLET(10 MG) BY MOUTH EVERY DAY     apixaban 5 mg (74 tabs) Dspk  Commonly known as: ELIQUIS  For the first 7 days take two 5 mg tablets twice  "daily.  After 7 days take one 5 mg tablet twice daily.     ascorbic acid (vitamin C) 500 MG tablet  Commonly known as: VITAMIN C  Take 1 tablet (500 mg total) by mouth 2 (two) times daily.     blood sugar diagnostic Strp  1 strip by Misc.(Non-Drug; Combo Route) route 3 (three) times daily.     blood-glucose meter Misc  1 Device by Misc.(Non-Drug; Combo Route) route 3 (three) times daily.     carBAMazepine 200 MG Cm12  Commonly known as: CARBATROL  TAKE 1 CAPSULE(200 MG) BY MOUTH TWICE DAILY     carvediloL 3.125 MG tablet  Commonly known as: COREG  Take 1 tablet (3.125 mg total) by mouth 2 (two) times daily.     diclofenac sodium 1 % Gel  Commonly known as: VOLTAREN  Apply 2 g topically 4 (four) times daily as needed (Pain).     donepeziL 5 MG tablet  Commonly known as: ARICEPT  Take 1 tablet (5 mg total) by mouth once daily.     ergocalciferol 50,000 unit Cap  Commonly known as: VITAMIN D2  Take 1 capsule (50,000 Units total) by mouth every 7 days.     insulin syringe-needle U-100 1/2 mL 27 gauge x 1/2" Syrg  Commonly known as: INS SYRINGE/NEEDLE 0.5 ML 27 G  1 each by Misc.(Non-Drug; Combo Route) route 2 (two) times daily.     LIDOcaine 5 %  Commonly known as: LIDODERM  Place 1 patch onto the skin once daily. Remove & Discard patch within 12 hours then leave off for 12 hours     multivitamin Tab  Take 1 tablet by mouth once daily.     pravastatin 40 MG tablet  Commonly known as: PRAVACHOL  Take 1 tablet (40 mg total) by mouth every evening.     UNIFINE PENTIPS 32 gauge x 1/4" Ndle  Generic drug: pen needle, diabetic              SOCIAL HISTORY:     Social History     Socioeconomic History    Marital status: Single    Number of children: 7    Years of education: 5   Occupational History    Occupation: housewife   Tobacco Use    Smoking status: Never    Smokeless tobacco: Never   Substance and Sexual Activity    Alcohol use: No    Drug use: No    Sexual activity: Never     Social Drivers of Health     Financial " "Resource Strain: Low Risk  (3/13/2019)    Overall Financial Resource Strain (CARDIA)     Difficulty of Paying Living Expenses: Not very hard   Food Insecurity: No Food Insecurity (3/13/2019)    Hunger Vital Sign     Worried About Running Out of Food in the Last Year: Never true     Ran Out of Food in the Last Year: Never true   Transportation Needs: No Transportation Needs (3/13/2019)    PRAPARE - Transportation     Lack of Transportation (Medical): No     Lack of Transportation (Non-Medical): No   Physical Activity: Inactive (3/13/2019)    Exercise Vital Sign     Days of Exercise per Week: 0 days     Minutes of Exercise per Session: 0 min   Stress: No Stress Concern Present (3/13/2019)    [place] [place] [employer] - Occupational Stress Questionnaire     Feeling of Stress : Only a little       FAMILY HISTORY:     Family History   Problem Relation Name Age of Onset    Diabetes Mother         REVIEW OF SYSTEMS:   Review of Systems   Unable to perform ROS: Dementia       PHYSICAL EXAM:     Vitals:    09/30/24 1109   BP: 138/60   Pulse: 61   Resp: 18    Body mass index is 25.79 kg/m².  Weight: 70.3 kg (154 lb 15.7 oz)   Height: 5' 5" (165.1 cm)     Physical Exam  Vitals reviewed.   Constitutional:       General: She is not in acute distress.     Appearance: Normal appearance. She is well-developed and normal weight. She is not ill-appearing, toxic-appearing or diaphoretic.   HENT:      Head: Normocephalic and atraumatic.   Eyes:      General: No scleral icterus.     Extraocular Movements: Extraocular movements intact.      Conjunctiva/sclera: Conjunctivae normal.      Pupils: Pupils are equal, round, and reactive to light.   Neck:      Thyroid: No thyromegaly.      Vascular: Normal carotid pulses. No carotid bruit or JVD.      Trachea: Trachea normal.   Cardiovascular:      Rate and Rhythm: Normal rate and regular rhythm.      Pulses:           Carotid pulses are 2+ on the right side and 2+ on the left " side.     Heart sounds: S1 normal and S2 normal. Murmur heard.      Systolic murmur is present with a grade of 2/6.      No friction rub. No gallop.   Pulmonary:      Effort: Pulmonary effort is normal. No respiratory distress.      Breath sounds: Normal breath sounds. No stridor. No wheezing, rhonchi or rales.   Chest:      Chest wall: No tenderness.   Abdominal:      General: There is no distension.      Palpations: Abdomen is soft.   Musculoskeletal:         General: No swelling or tenderness. Normal range of motion.      Cervical back: Normal range of motion and neck supple. No edema or rigidity.      Right lower leg: No edema.      Left lower leg: No edema.   Feet:      Right foot:      Skin integrity: No ulcer.      Left foot:      Skin integrity: No ulcer.   Skin:     General: Skin is warm and dry.      Coloration: Skin is not jaundiced.   Neurological:      General: No focal deficit present.      Mental Status: She is alert and oriented to person, place, and time.      Cranial Nerves: No cranial nerve deficit.   Psychiatric:         Mood and Affect: Mood normal.         Speech: Speech normal.         Behavior: Behavior normal. Behavior is cooperative.         DATA:   EKG: (personally reviewed tracing)  9/30/24 SR 58, LVH    Laboratory:  CBC:  Recent Labs   Lab 08/07/23  0414 12/17/23  1452 12/17/23  1507 02/09/24  1628   WBC 4.57  --  4.77 6.19   Hemoglobin 9.0 L  --  9.6 L 8.3 L   POC Hematocrit  --  35 L  --   --    Hematocrit 29.6 L  --  31.1 L 27.0 L   Platelets 210  --  203 210       CHEMISTRIES:  Recent Labs   Lab 08/06/23  1610 08/07/23  0414 08/07/23  1223 12/17/23  1507 02/09/24  1628   Glucose 126 H 83 110 109 120 H   Sodium 136 138 138 143 144   Potassium 3.3 L 3.6 4.3 4.2 4.1   BUN 40 H 36 H 29 H 26 35 H   Creatinine 2.5 H 2.0 H 1.7 H 1.6 H 1.7 H   eGFR 18 A 23 A 28 A 30 A 28 A   Calcium 8.9 8.7 9.4 9.3 9.1   Magnesium 2.4 2.3  --   --   --        CARDIAC BIOMARKERS:  Recent Labs   Lab  08/06/23  1610 08/06/23  2333 08/07/23  0414    H  --   --    Troponin I 0.042 H 0.078 H 0.054 H       COAGS:  Recent Labs   Lab 02/09/24  1628   INR 1.0       LIPIDS/LFTS:  Recent Labs   Lab 08/07/23  0414 12/17/23  1507 02/09/24  1628   AST 16 15 12   ALT 10 10 11       Cardiovascular Testing:  RLE venous US 2/9/24  Occlusive DVT in the distal right external iliac vein, common femoral vein, profunda femoral vein and femoral vein.  Follow-up recommended.  Intraluminal thrombus in the right greater saphenous vein also.  Follow-up recommended.    ASSESSMENT:   # hx RLE DVT 2/2024, no edema at present, ?on eliquis  # murmur of AS  # HTN, controlled  # HLP on prava 40mg  # CKD4  # aortic atherosclerosis (CT A/P 12/17/23)  # ?Alz dementia    PLAN:   Cont med rx  Check echo  Check RLE venous US to assess for resolution of DVT  RTC 1 month (Nov 2024).  If US neg for persistent DVT, OK to stop eliquis.    The above documents medical care services that are part of ongoing care related to this patient's serious/complex condition (Code ) (HTN/HLP/DVT).      Grupo Reddy MD, FACC

## 2024-09-30 NOTE — LETTER
September 30, 2024        Imtiaz Frye MD  [address] [place] [address]   [address]  [place] [address]             [facility] - Cardiology  [address]  [address]  [facility] [place] [address]  Phone: [phone]   Patient: Jossie Morejon   MR Number: [id number]   YOB: 1932   Date of Visit: 9/30/2024       Dear Dr. Frye:    Thank you for referring Jossie Morejon to me for evaluation. Attached you will find relevant portions of my assessment and plan of care.    If you have questions, please do not hesitate to call me. I look forward to following Jossie Morejon along with you.    Sincerely,      Grupo Reddy MD            CC  No Recipients    Enclosure

## 2024-10-01 LAB
OHS QRS DURATION: 102 MS
OHS QTC CALCULATION: 439 MS

## 2024-12-28 ENCOUNTER — HOSPITAL ENCOUNTER (INPATIENT)
Facility: HOSPITAL | Age: 89
LOS: 1 days | Discharge: HOME | DRG: 690 | End: 2024-12-30
Attending: EMERGENCY MEDICINE | Admitting: EMERGENCY MEDICINE
Payer: MEDICARE

## 2024-12-28 DIAGNOSIS — M79.89 LEFT ARM SWELLING: ICD-10-CM

## 2024-12-28 DIAGNOSIS — N30.00 ACUTE CYSTITIS WITHOUT HEMATURIA: Primary | ICD-10-CM

## 2024-12-28 DIAGNOSIS — R41.0 DISORIENTATION: ICD-10-CM

## 2024-12-28 DIAGNOSIS — Z85.3 HISTORY OF BREAST CANCER: ICD-10-CM

## 2024-12-28 DIAGNOSIS — F02.80 LATE ONSET ALZHEIMER'S DISEASE WITHOUT BEHAVIORAL DISTURBANCE: ICD-10-CM

## 2024-12-28 DIAGNOSIS — R41.82 ALTERED MENTAL STATUS: ICD-10-CM

## 2024-12-28 DIAGNOSIS — I82.622 ACUTE DEEP VEIN THROMBOSIS (DVT) OF OTHER VEIN OF LEFT UPPER EXTREMITY: ICD-10-CM

## 2024-12-28 DIAGNOSIS — G30.1 LATE ONSET ALZHEIMER'S DISEASE WITHOUT BEHAVIORAL DISTURBANCE: ICD-10-CM

## 2024-12-28 PROBLEM — N39.0 URINARY TRACT INFECTION WITHOUT HEMATURIA: Status: ACTIVE | Noted: 2024-12-28

## 2024-12-28 LAB
ALBUMIN SERPL BCP-MCNC: 3.7 G/DL (ref 3.5–5.2)
ALLENS TEST: ABNORMAL
ALP SERPL-CCNC: 210 U/L (ref 40–150)
ALT SERPL W/O P-5'-P-CCNC: 20 U/L (ref 10–44)
AMMONIA PLAS-SCNC: 26 UMOL/L (ref 10–50)
ANION GAP SERPL CALC-SCNC: 11 MMOL/L (ref 8–16)
AST SERPL-CCNC: 16 U/L (ref 10–40)
BACTERIA #/AREA URNS HPF: ABNORMAL /HPF
BASOPHILS # BLD AUTO: 0.02 K/UL (ref 0–0.2)
BASOPHILS NFR BLD: 0.4 % (ref 0–1.9)
BILIRUB SERPL-MCNC: 0.2 MG/DL (ref 0.1–1)
BILIRUB UR QL STRIP: NEGATIVE
BUN SERPL-MCNC: 26 MG/DL (ref 10–30)
CALCIUM SERPL-MCNC: 9 MG/DL (ref 8.7–10.5)
CHLORIDE SERPL-SCNC: 108 MMOL/L (ref 95–110)
CLARITY UR: ABNORMAL
CO2 SERPL-SCNC: 23 MMOL/L (ref 23–29)
COLOR UR: YELLOW
CREAT SERPL-MCNC: 1.4 MG/DL (ref 0.5–1.4)
DIFFERENTIAL METHOD BLD: ABNORMAL
EOSINOPHIL # BLD AUTO: 0.2 K/UL (ref 0–0.5)
EOSINOPHIL NFR BLD: 4 % (ref 0–8)
ERYTHROCYTE [DISTWIDTH] IN BLOOD BY AUTOMATED COUNT: 15.6 % (ref 11.5–14.5)
EST. GFR  (NO RACE VARIABLE): 35 ML/MIN/1.73 M^2
GLUCOSE SERPL-MCNC: 90 MG/DL (ref 70–110)
GLUCOSE UR QL STRIP: NEGATIVE
HCO3 UR-SCNC: 26.7 MMOL/L (ref 24–28)
HCT VFR BLD AUTO: 34.9 % (ref 37–48.5)
HGB BLD-MCNC: 10.9 G/DL (ref 12–16)
HGB UR QL STRIP: NEGATIVE
HYALINE CASTS #/AREA URNS LPF: 0 /LPF
IMM GRANULOCYTES # BLD AUTO: 0.02 K/UL (ref 0–0.04)
IMM GRANULOCYTES NFR BLD AUTO: 0.4 % (ref 0–0.5)
KETONES UR QL STRIP: NEGATIVE
LACTATE SERPL-SCNC: 1 MMOL/L (ref 0.5–2.2)
LEUKOCYTE ESTERASE UR QL STRIP: ABNORMAL
LIPASE SERPL-CCNC: 37 U/L (ref 4–60)
LYMPHOCYTES # BLD AUTO: 1.9 K/UL (ref 1–4.8)
LYMPHOCYTES NFR BLD: 35.6 % (ref 18–48)
MCH RBC QN AUTO: 28.5 PG (ref 27–31)
MCHC RBC AUTO-ENTMCNC: 31.2 G/DL (ref 32–36)
MCV RBC AUTO: 91 FL (ref 82–98)
MICROSCOPIC COMMENT: ABNORMAL
MONOCYTES # BLD AUTO: 0.5 K/UL (ref 0.3–1)
MONOCYTES NFR BLD: 8.9 % (ref 4–15)
NEUTROPHILS # BLD AUTO: 2.7 K/UL (ref 1.8–7.7)
NEUTROPHILS NFR BLD: 50.7 % (ref 38–73)
NITRITE UR QL STRIP: NEGATIVE
NON-SQ EPI CELLS #/AREA URNS HPF: 1 /HPF
NRBC BLD-RTO: 0 /100 WBC
PCO2 BLDA: 47.5 MMHG (ref 35–45)
PH SMN: 7.36 [PH] (ref 7.35–7.45)
PH UR STRIP: 8 [PH] (ref 5–8)
PLATELET # BLD AUTO: 250 K/UL (ref 150–450)
PMV BLD AUTO: 9.8 FL (ref 9.2–12.9)
PO2 BLDA: 19 MMHG (ref 40–60)
POC BE: 1 MMOL/L
POC SATURATED O2: 26 % (ref 95–100)
POC TCO2: 28 MMOL/L (ref 24–29)
POTASSIUM SERPL-SCNC: 4.4 MMOL/L (ref 3.5–5.1)
PROT SERPL-MCNC: 7.5 G/DL (ref 6–8.4)
PROT UR QL STRIP: ABNORMAL
RBC # BLD AUTO: 3.83 M/UL (ref 4–5.4)
RBC #/AREA URNS HPF: 0 /HPF (ref 0–4)
SAMPLE: ABNORMAL
SITE: ABNORMAL
SODIUM SERPL-SCNC: 142 MMOL/L (ref 136–145)
SP GR UR STRIP: 1.01 (ref 1–1.03)
SQUAMOUS #/AREA URNS HPF: 2 /HPF
URN SPEC COLLECT METH UR: ABNORMAL
UROBILINOGEN UR STRIP-ACNC: NEGATIVE EU/DL
WBC # BLD AUTO: 5.28 K/UL (ref 3.9–12.7)
WBC #/AREA URNS HPF: 92 /HPF (ref 0–5)

## 2024-12-28 PROCEDURE — G0378 HOSPITAL OBSERVATION PER HR: HCPCS

## 2024-12-28 PROCEDURE — 87086 URINE CULTURE/COLONY COUNT: CPT | Performed by: PHYSICIAN ASSISTANT

## 2024-12-28 PROCEDURE — 80053 COMPREHEN METABOLIC PANEL: CPT | Performed by: PHYSICIAN ASSISTANT

## 2024-12-28 PROCEDURE — 99900035 HC TECH TIME PER 15 MIN (STAT)

## 2024-12-28 PROCEDURE — 93010 ELECTROCARDIOGRAM REPORT: CPT | Mod: ,,, | Performed by: INTERNAL MEDICINE

## 2024-12-28 PROCEDURE — 96375 TX/PRO/DX INJ NEW DRUG ADDON: CPT

## 2024-12-28 PROCEDURE — 99285 EMERGENCY DEPT VISIT HI MDM: CPT | Mod: 25

## 2024-12-28 PROCEDURE — 25000003 PHARM REV CODE 250: Performed by: STUDENT IN AN ORGANIZED HEALTH CARE EDUCATION/TRAINING PROGRAM

## 2024-12-28 PROCEDURE — 96374 THER/PROPH/DIAG INJ IV PUSH: CPT

## 2024-12-28 PROCEDURE — 93005 ELECTROCARDIOGRAM TRACING: CPT

## 2024-12-28 PROCEDURE — 82803 BLOOD GASES ANY COMBINATION: CPT

## 2024-12-28 PROCEDURE — 82140 ASSAY OF AMMONIA: CPT | Performed by: EMERGENCY MEDICINE

## 2024-12-28 PROCEDURE — 85025 COMPLETE CBC W/AUTO DIFF WBC: CPT | Performed by: PHYSICIAN ASSISTANT

## 2024-12-28 PROCEDURE — 96372 THER/PROPH/DIAG INJ SC/IM: CPT | Performed by: STUDENT IN AN ORGANIZED HEALTH CARE EDUCATION/TRAINING PROGRAM

## 2024-12-28 PROCEDURE — 63600175 PHARM REV CODE 636 W HCPCS: Performed by: EMERGENCY MEDICINE

## 2024-12-28 PROCEDURE — 81000 URINALYSIS NONAUTO W/SCOPE: CPT | Performed by: PHYSICIAN ASSISTANT

## 2024-12-28 PROCEDURE — 83605 ASSAY OF LACTIC ACID: CPT | Performed by: EMERGENCY MEDICINE

## 2024-12-28 PROCEDURE — 83690 ASSAY OF LIPASE: CPT | Performed by: PHYSICIAN ASSISTANT

## 2024-12-28 PROCEDURE — 87186 SC STD MICRODIL/AGAR DIL: CPT | Performed by: PHYSICIAN ASSISTANT

## 2024-12-28 PROCEDURE — 87088 URINE BACTERIA CULTURE: CPT | Performed by: PHYSICIAN ASSISTANT

## 2024-12-28 PROCEDURE — 25000003 PHARM REV CODE 250: Performed by: EMERGENCY MEDICINE

## 2024-12-28 PROCEDURE — 63600175 PHARM REV CODE 636 W HCPCS: Performed by: STUDENT IN AN ORGANIZED HEALTH CARE EDUCATION/TRAINING PROGRAM

## 2024-12-28 PROCEDURE — 87040 BLOOD CULTURE FOR BACTERIA: CPT | Mod: 59 | Performed by: EMERGENCY MEDICINE

## 2024-12-28 RX ORDER — HEPARIN SODIUM 5000 [USP'U]/ML
5000 INJECTION, SOLUTION INTRAVENOUS; SUBCUTANEOUS EVERY 8 HOURS
Status: DISCONTINUED | OUTPATIENT
Start: 2024-12-28 | End: 2024-12-30

## 2024-12-28 RX ORDER — LABETALOL HYDROCHLORIDE 5 MG/ML
10 INJECTION, SOLUTION INTRAVENOUS
Status: COMPLETED | OUTPATIENT
Start: 2024-12-28 | End: 2024-12-28

## 2024-12-28 RX ORDER — SODIUM,POTASSIUM PHOSPHATES 280-250MG
2 POWDER IN PACKET (EA) ORAL
Status: DISCONTINUED | OUTPATIENT
Start: 2024-12-28 | End: 2024-12-30 | Stop reason: HOSPADM

## 2024-12-28 RX ORDER — BISACODYL 10 MG/1
10 SUPPOSITORY RECTAL DAILY PRN
Status: DISCONTINUED | OUTPATIENT
Start: 2024-12-28 | End: 2024-12-30 | Stop reason: HOSPADM

## 2024-12-28 RX ORDER — LANOLIN ALCOHOL/MO/W.PET/CERES
800 CREAM (GRAM) TOPICAL
Status: DISCONTINUED | OUTPATIENT
Start: 2024-12-28 | End: 2024-12-30 | Stop reason: HOSPADM

## 2024-12-28 RX ORDER — HYDROCODONE BITARTRATE AND ACETAMINOPHEN 5; 325 MG/1; MG/1
1 TABLET ORAL EVERY 6 HOURS PRN
Status: DISCONTINUED | OUTPATIENT
Start: 2024-12-28 | End: 2024-12-29

## 2024-12-28 RX ORDER — LABETALOL HYDROCHLORIDE 5 MG/ML
10 INJECTION, SOLUTION INTRAVENOUS EVERY 6 HOURS PRN
Status: DISCONTINUED | OUTPATIENT
Start: 2024-12-28 | End: 2024-12-30 | Stop reason: HOSPADM

## 2024-12-28 RX ORDER — INSULIN ASPART 100 [IU]/ML
0-5 INJECTION, SOLUTION INTRAVENOUS; SUBCUTANEOUS
Status: DISCONTINUED | OUTPATIENT
Start: 2024-12-28 | End: 2024-12-30 | Stop reason: HOSPADM

## 2024-12-28 RX ORDER — CARVEDILOL 3.12 MG/1
3.12 TABLET ORAL 2 TIMES DAILY WITH MEALS
COMMUNITY

## 2024-12-28 RX ORDER — CEFTRIAXONE 1 G/1
1 INJECTION, POWDER, FOR SOLUTION INTRAMUSCULAR; INTRAVENOUS
Status: DISCONTINUED | OUTPATIENT
Start: 2024-12-28 | End: 2024-12-30 | Stop reason: HOSPADM

## 2024-12-28 RX ORDER — CEFTRIAXONE 2 G/1
2 INJECTION, POWDER, FOR SOLUTION INTRAMUSCULAR; INTRAVENOUS
Status: COMPLETED | OUTPATIENT
Start: 2024-12-28 | End: 2024-12-28

## 2024-12-28 RX ORDER — POLYETHYLENE GLYCOL 3350 17 G/17G
17 POWDER, FOR SOLUTION ORAL DAILY
Status: DISCONTINUED | OUTPATIENT
Start: 2024-12-29 | End: 2024-12-30 | Stop reason: HOSPADM

## 2024-12-28 RX ORDER — NALOXONE HCL 0.4 MG/ML
0.02 VIAL (ML) INJECTION
Status: DISCONTINUED | OUTPATIENT
Start: 2024-12-28 | End: 2024-12-30 | Stop reason: HOSPADM

## 2024-12-28 RX ORDER — GLUCAGON 1 MG
1 KIT INJECTION
Status: DISCONTINUED | OUTPATIENT
Start: 2024-12-28 | End: 2024-12-30 | Stop reason: HOSPADM

## 2024-12-28 RX ORDER — IBUPROFEN 200 MG
16 TABLET ORAL
Status: DISCONTINUED | OUTPATIENT
Start: 2024-12-28 | End: 2024-12-30 | Stop reason: HOSPADM

## 2024-12-28 RX ORDER — HYDROCODONE BITARTRATE AND ACETAMINOPHEN 10; 325 MG/1; MG/1
1 TABLET ORAL EVERY 6 HOURS PRN
Status: DISCONTINUED | OUTPATIENT
Start: 2024-12-28 | End: 2024-12-29

## 2024-12-28 RX ORDER — LABETALOL HYDROCHLORIDE 5 MG/ML
20 INJECTION, SOLUTION INTRAVENOUS
Status: DISCONTINUED | OUTPATIENT
Start: 2024-12-28 | End: 2024-12-28

## 2024-12-28 RX ORDER — LORAZEPAM 0.5 MG/1
TABLET ORAL
COMMUNITY
Start: 2024-12-19 | End: 2024-12-28

## 2024-12-28 RX ORDER — CARBAMAZEPINE 100 MG/1
400 TABLET, EXTENDED RELEASE ORAL 2 TIMES DAILY
Status: DISCONTINUED | OUTPATIENT
Start: 2024-12-28 | End: 2024-12-30 | Stop reason: HOSPADM

## 2024-12-28 RX ORDER — CARVEDILOL 3.12 MG/1
3.12 TABLET ORAL 2 TIMES DAILY
Status: DISCONTINUED | OUTPATIENT
Start: 2024-12-28 | End: 2024-12-30

## 2024-12-28 RX ORDER — ACETAMINOPHEN 325 MG/1
650 TABLET ORAL EVERY 4 HOURS PRN
Status: DISCONTINUED | OUTPATIENT
Start: 2024-12-28 | End: 2024-12-30 | Stop reason: HOSPADM

## 2024-12-28 RX ORDER — AMOXICILLIN 250 MG
1 CAPSULE ORAL 2 TIMES DAILY PRN
Status: DISCONTINUED | OUTPATIENT
Start: 2024-12-28 | End: 2024-12-30 | Stop reason: HOSPADM

## 2024-12-28 RX ORDER — SIMETHICONE 80 MG
1 TABLET,CHEWABLE ORAL 4 TIMES DAILY PRN
Status: DISCONTINUED | OUTPATIENT
Start: 2024-12-28 | End: 2024-12-30 | Stop reason: HOSPADM

## 2024-12-28 RX ORDER — ACETAMINOPHEN 325 MG/1
650 TABLET ORAL
Status: COMPLETED | OUTPATIENT
Start: 2024-12-28 | End: 2024-12-28

## 2024-12-28 RX ORDER — ONDANSETRON HYDROCHLORIDE 2 MG/ML
4 INJECTION, SOLUTION INTRAVENOUS EVERY 8 HOURS PRN
Status: DISCONTINUED | OUTPATIENT
Start: 2024-12-28 | End: 2024-12-30 | Stop reason: HOSPADM

## 2024-12-28 RX ORDER — SODIUM CHLORIDE 0.9 % (FLUSH) 0.9 %
3 SYRINGE (ML) INJECTION EVERY 12 HOURS PRN
Status: DISCONTINUED | OUTPATIENT
Start: 2024-12-28 | End: 2024-12-30 | Stop reason: HOSPADM

## 2024-12-28 RX ORDER — TALC
6 POWDER (GRAM) TOPICAL NIGHTLY PRN
Status: DISCONTINUED | OUTPATIENT
Start: 2024-12-28 | End: 2024-12-30 | Stop reason: HOSPADM

## 2024-12-28 RX ORDER — ACETAMINOPHEN 325 MG/1
650 TABLET ORAL EVERY 8 HOURS PRN
Status: DISCONTINUED | OUTPATIENT
Start: 2024-12-28 | End: 2024-12-30 | Stop reason: HOSPADM

## 2024-12-28 RX ORDER — IBUPROFEN 200 MG
24 TABLET ORAL
Status: DISCONTINUED | OUTPATIENT
Start: 2024-12-28 | End: 2024-12-30 | Stop reason: HOSPADM

## 2024-12-28 RX ORDER — CARBAMAZEPINE 400 MG/1
400 TABLET, EXTENDED RELEASE ORAL 2 TIMES DAILY
COMMUNITY
Start: 2024-12-20

## 2024-12-28 RX ADMIN — HEPARIN SODIUM 5000 UNITS: 5000 INJECTION INTRAVENOUS; SUBCUTANEOUS at 09:12

## 2024-12-28 RX ADMIN — CARBAMAZEPINE 400 MG: 100 TABLET, EXTENDED RELEASE ORAL at 10:12

## 2024-12-28 RX ADMIN — CEFTRIAXONE 2 G: 2 INJECTION, POWDER, FOR SOLUTION INTRAMUSCULAR; INTRAVENOUS at 04:12

## 2024-12-28 RX ADMIN — CEFTRIAXONE 1 G: 1 INJECTION, POWDER, FOR SOLUTION INTRAMUSCULAR; INTRAVENOUS at 10:12

## 2024-12-28 RX ADMIN — LABETALOL HYDROCHLORIDE 10 MG: 5 INJECTION INTRAVENOUS at 05:12

## 2024-12-28 RX ADMIN — CARVEDILOL 3.12 MG: 3.12 TABLET, FILM COATED ORAL at 09:12

## 2024-12-28 RX ADMIN — ACETAMINOPHEN 650 MG: 325 TABLET ORAL at 06:12

## 2024-12-28 NOTE — ED PROVIDER NOTES
"Encounter Date: 12/28/2024       History     Chief Complaint   Patient presents with    Abdominal Pain     Pt to ED from [place] [facility] with granddaughter with c/o abdominal pain - unknown of presentation. Pt has a history of dementia and granddaughter states pt has been in a "zombie state" due to the nursing home administering 0.5 mg ativan TID. Pt denies cp, sob,  n/v./d.     89-year-old female presenting from the nursing home with altered mental status.  Per granddaughter, the nursing home has started to give the patient Valium around the clock for unknown reasons.  Reportedly about a week ago she had an episode of acting out and since then they have kept her sedated.  Today daughter reports that the patient is significantly lethargic, not as responsive as usual.  Patient will follow commands and answer questions but is snowed.  Patient had been complaining of her stomach hurting.  History limited due to altered mental status.      Review of patient's allergies indicates:  No Known Allergies  Past Medical History:   Diagnosis Date    Alzheimer disease     Breast cancer     CKD (chronic kidney disease)     Diabetes mellitus type II     Dyslipidemia     Dyslipidemia     Essential hypertension, benign     History of CVA (cerebrovascular accident)     Hypercholesteremia     Microalbuminuria     Mild anemia     Seizure disorder      History reviewed. No pertinent surgical history.  Family History   Problem Relation Name Age of Onset    Diabetes Mother       Social History     Tobacco Use    Smoking status: Never    Smokeless tobacco: Never   Substance Use Topics    Alcohol use: No    Drug use: No     Review of Systems   Unable to perform ROS: Acuity of condition       Physical Exam     Initial Vitals [12/28/24 1244]   BP Pulse Resp Temp SpO2   138/63 60 18 98.3 °F (36.8 °C) 97 %      MAP       --         Physical Exam    Nursing note and vitals reviewed.  Constitutional: She appears well-developed and well-nourished. She " is not diaphoretic. No distress.   HENT:   Head: Normocephalic and atraumatic.   Nose: Nose normal.   Eyes: EOM are normal. Pupils are equal, round, and reactive to light.   Neck: Neck supple.   Normal range of motion.  Cardiovascular:  Normal rate, regular rhythm, normal heart sounds and intact distal pulses.     Exam reveals no gallop and no friction rub.       No murmur heard.  Pulmonary/Chest: Breath sounds normal. No respiratory distress. She has no wheezes. She has no rhonchi. She has no rales. She exhibits no tenderness.   Abdominal: Abdomen is soft. Bowel sounds are normal. She exhibits no distension. There is no abdominal tenderness. There is no rebound and no guarding.   Musculoskeletal:         General: No edema. Normal range of motion.      Cervical back: Normal range of motion and neck supple.     Neurological: She is alert. She has normal strength. No cranial nerve deficit. GCS score is 15. GCS eye subscore is 4. GCS verbal subscore is 5. GCS motor subscore is 6.   Skin: Skin is warm and dry.   Psychiatric: She has a normal mood and affect. Her behavior is normal.         ED Course   Procedures  Labs Reviewed   CBC W/ AUTO DIFFERENTIAL - Abnormal       Result Value    WBC 5.28      RBC 3.83 (*)     Hemoglobin 10.9 (*)     Hematocrit 34.9 (*)     MCV 91      MCH 28.5      MCHC 31.2 (*)     RDW 15.6 (*)     Platelets 250      MPV 9.8      Immature Granulocytes 0.4      Gran # (ANC) 2.7      Immature Grans (Abs) 0.02      Lymph # 1.9      Mono # 0.5      Eos # 0.2      Baso # 0.02      nRBC 0      Gran % 50.7      Lymph % 35.6      Mono % 8.9      Eosinophil % 4.0      Basophil % 0.4      Differential Method Automated     COMPREHENSIVE METABOLIC PANEL - Abnormal    Sodium 142      Potassium 4.4      Chloride 108      CO2 23      Glucose 90      BUN 26      Creatinine 1.4      Calcium 9.0      Total Protein 7.5      Albumin 3.7      Total Bilirubin 0.2      Alkaline Phosphatase 210 (*)     AST 16      ALT  20      eGFR 35 (*)     Anion Gap 11     URINALYSIS, REFLEX TO URINE CULTURE - Abnormal    Specimen UA Urine, Catheterized      Color, UA Yellow      Appearance, UA Hazy (*)     pH, UA 8.0      Specific Gravity, UA 1.015      Protein, UA 1+ (*)     Glucose, UA Negative      Ketones, UA Negative      Bilirubin (UA) Negative      Occult Blood UA Negative      Nitrite, UA Negative      Urobilinogen, UA Negative      Leukocytes, UA 3+ (*)     Narrative:     Specimen Source->Urine   URINALYSIS MICROSCOPIC - Abnormal    RBC, UA 0      WBC, UA 92 (*)     Bacteria Many (*)     Squam Epithel, UA 2      Non-Squam Epith 1 (*)     Hyaline Casts, UA 0      Microscopic Comment SEE COMMENT      Narrative:     Specimen Source->Urine   ISTAT PROCEDURE - Abnormal    POC PH 7.358      POC PCO2 47.5 (*)     POC PO2 19 (*)     POC HCO3 26.7      POC BE 1      POC SATURATED O2 26      POC TCO2 28      Sample VENOUS      Site Other      Allens Test N/A     CULTURE, URINE   CULTURE, BLOOD   CULTURE, BLOOD   LIPASE    Lipase 37     LACTIC ACID, PLASMA    Lactate (Lactic Acid) 1.0     AMMONIA    Ammonia 26     URINALYSIS, REFLEX TO URINE CULTURE          Imaging Results              CT Head Without Contrast (Final result)  Result time 12/28/24 18:29:36      Final result by Lori Pro MD (12/28/24 18:29:36)                   Impression:      No acute intracranial abnormalities identified.  No significant detrimental change compared to previous CT head from August 2023.      Electronically signed by: Lori Pro MD  Date:    12/28/2024  Time:    18:29               Narrative:    EXAMINATION:  CT HEAD WITHOUT CONTRAST    CLINICAL HISTORY:  Mental status change, unknown cause;    TECHNIQUE:  Low dose axial images were obtained through the head.  Coronal and sagittal reformations were also performed. Contrast was not administered.    COMPARISON:  CT head from August 2023.    FINDINGS:  There is generalized cerebral volume loss with  chronic microvascular ischemic disease.  No evidence of acute/recent major vascular distribution cerebral infarction, intraparenchymal hemorrhage, or intra-axial space occupying lesion. The ventricular system is normal in size and configuration with no evidence of hydrocephalus. No effacement of the skull-base cisterns.  Empty sella configuration is noted.  No abnormal extra-axial fluid collections or blood products. Visualized paranasal sinuses and mastoid air cells are clear. The calvarium shows no significant abnormality.                                       CT Chest Abdomen Pelvis Without Contrast (XPD) (Final result)  Result time 12/28/24 18:43:04   Procedure changed from CT Chest Abdomen Pelvis With IV Contrast (XPD) Routine Oral Contrast     Final result by Lori Pro MD (12/28/24 18:43:04)                   Impression:      1. Significant contrast extravasation within the proximal aspect of the left forearm.  Clinical correlation and follow-up are advised.  2. Minimal dependent density and atelectatic changes at the lung bases without evidence of focal consolidation.  Small 6 mm nodular opacity or pulmonary nodule seen at the right lung base.  Potential mild focal infectious or inflammatory process is a possibility.  3. No acute intra-abdominal abnormalities identified.  4. Multiple additional findings as detailed above.      Electronically signed by: Lori Pro MD  Date:    12/28/2024  Time:    18:43               Narrative:    EXAMINATION:  CT CHEST ABDOMEN PELVIS WITHOUT CONTRAST(XPD)    CLINICAL HISTORY:  Sepsis;    TECHNIQUE:  CT chest abdomen and pelvis was performed following administration of 75 cc Omnipaque 350 IV contrast.  Contrast is however noted to be extravasated within the left proximal forearm with no significant contrast seen within the chest or abdomen.  Sagittal and coronal reformats were obtained.    COMPARISON:  CT abdomen and pelvis from December 2023.    FINDINGS:  Heart  is enlarged with no pericardial effusion.  Coronary artery calcification and aortic valve calcification are seen.  Aorta is nonaneurysmal.  Scattered air is seen within the esophagus.  Lungs are symmetrically expanded.  Major airways are patent.  Minimal dependent density and atelectatic changes are seen in the lung bases.  Otherwise no evidence of focal consolidation.  No pleural effusion.  Small nodular opacity or nodule is seen at the right lung base measuring 6 mm.    Stable left hepatic lobe hypodense lesion, probable cyst is seen.  There is no intra-or extrahepatic biliary ductal dilatation.  The gallbladder is unremarkable.  The stomach, pancreas, spleen, and adrenal glands are unremarkable.    Kidneys show no evidence of stones or hydronephrosis.  Several bilateral renal cysts and hyperdense lesions, possible hyperdense cysts are seen.  These appear overall stable from December 2023.  No abnormalities are seen along the ureteral courses.  Urinary bladder is unremarkable.  Uterus is somewhat prominent for age with questionable endometrial thickening which is suboptimally evaluated on the basis of CT.    Appendix is visualized and is unremarkable.  The visualized loops of small and large bowel show no evidence of obstruction or inflammation.  No free air or free fluid.    Aorta tapers normally with moderate atherosclerosis.    No acute osseous abnormality identified.  Degenerative changes are seen in the spine.  There is redemonstration of mixed sclerotic/lucent lesion seen within the T12 vertebral body, possibly representing an atypical hemangioma.  This is unchanged from December 2023.  There is mildly enlarged 3.5 cm lesion or collection seen at the lateral aspect of the right breast.    Significant contrast extravasation is seen within the proximal aspect of the left forearm.                                       Medications   cefTRIAXone injection 2 g (2 g Intravenous Given 12/28/24 1623)   labetaloL  injection 10 mg (10 mg Intravenous Given 12/28/24 1709)   acetaminophen tablet 650 mg (650 mg Oral Given 12/28/24 1849)     Medical Decision Making  Amount and/or Complexity of Data Reviewed  Labs: ordered.  Radiology: ordered.    Risk  OTC drugs.  Prescription drug management.                          Medical Decision Making:   Initial Assessment:   89-year-old female presenting from the nursing home with altered mental status.  Per granddaughter, the nursing home has started to give the patient Valium around the clock for unknown reasons.  Reportedly about a week ago she had an episode of acting out and since then they have kept her sedated.  Today daughter reports that the patient is significantly lethargic, not as responsive as usual.  Patient will follow commands and answer questions but is snowed.  Workup reveals a urinary tract infection.  Otherwise relatively unremarkable.  Patient had been complaining of her stomach hurting.  Abdomen soft and benign.  Obvious UTI.  She has received ceftriaxone.  I would like to admit her to Hospital Medicine for further evaluation and treatment of altered mental status, UTI.             Clinical Impression:  Final diagnoses:  [R41.82] Altered mental status  [N30.00] Acute cystitis without hematuria (Primary)          ED Disposition Condition    Observation Stable                Eleazar Castillo MD  12/28/24 1920       Eleazar Castillo MD  12/28/24 1922

## 2024-12-28 NOTE — ED TRIAGE NOTES
"BIB family due to report of abdominal pain. Family reports that patient currently reside in nursing home. Family states that when she went to visit patient all the patient was able to say was "stomach".  Family reports that patient has been receiving 0.5mg of ativan tid since 12/19/2024  "

## 2024-12-28 NOTE — ED NOTES
Bed: 02main  Expected date:   Expected time:   Means of arrival: Personal Transportation  Comments:

## 2024-12-29 PROBLEM — M79.89 LEFT ARM SWELLING: Status: ACTIVE | Noted: 2024-12-29

## 2024-12-29 LAB
AMPHET+METHAMPHET UR QL: NEGATIVE
ANION GAP SERPL CALC-SCNC: 12 MMOL/L (ref 8–16)
BARBITURATES UR QL SCN>200 NG/ML: NEGATIVE
BASOPHILS # BLD AUTO: 0.02 K/UL (ref 0–0.2)
BASOPHILS NFR BLD: 0.3 % (ref 0–1.9)
BENZODIAZ UR QL SCN>200 NG/ML: NEGATIVE
BUN SERPL-MCNC: 25 MG/DL (ref 10–30)
BZE UR QL SCN: NEGATIVE
CALCIUM SERPL-MCNC: 8.6 MG/DL (ref 8.7–10.5)
CANNABINOIDS UR QL SCN: NEGATIVE
CHLORIDE SERPL-SCNC: 109 MMOL/L (ref 95–110)
CHOLEST SERPL-MCNC: 238 MG/DL (ref 120–199)
CHOLEST/HDLC SERPL: 3.9 {RATIO} (ref 2–5)
CK SERPL-CCNC: 103 U/L (ref 20–180)
CO2 SERPL-SCNC: 20 MMOL/L (ref 23–29)
CREAT SERPL-MCNC: 1.3 MG/DL (ref 0.5–1.4)
CREAT UR-MCNC: 66.8 MG/DL (ref 15–325)
DIFFERENTIAL METHOD BLD: ABNORMAL
EOSINOPHIL # BLD AUTO: 0.2 K/UL (ref 0–0.5)
EOSINOPHIL NFR BLD: 2.7 % (ref 0–8)
ERYTHROCYTE [DISTWIDTH] IN BLOOD BY AUTOMATED COUNT: 15.6 % (ref 11.5–14.5)
EST. GFR  (NO RACE VARIABLE): 39 ML/MIN/1.73 M^2
ESTIMATED AVG GLUCOSE: 117 MG/DL (ref 68–131)
GLUCOSE SERPL-MCNC: 91 MG/DL (ref 70–110)
HBA1C MFR BLD: 5.7 % (ref 4–5.6)
HCT VFR BLD AUTO: 32.7 % (ref 37–48.5)
HDLC SERPL-MCNC: 61 MG/DL (ref 40–75)
HDLC SERPL: 25.6 % (ref 20–50)
HGB BLD-MCNC: 9.8 G/DL (ref 12–16)
IMM GRANULOCYTES # BLD AUTO: 0.03 K/UL (ref 0–0.04)
IMM GRANULOCYTES NFR BLD AUTO: 0.5 % (ref 0–0.5)
LDLC SERPL CALC-MCNC: 161.2 MG/DL (ref 63–159)
LYMPHOCYTES # BLD AUTO: 1.9 K/UL (ref 1–4.8)
LYMPHOCYTES NFR BLD: 31.9 % (ref 18–48)
MAGNESIUM SERPL-MCNC: 2.2 MG/DL (ref 1.6–2.6)
MCH RBC QN AUTO: 27.8 PG (ref 27–31)
MCHC RBC AUTO-ENTMCNC: 30 G/DL (ref 32–36)
MCV RBC AUTO: 93 FL (ref 82–98)
METHADONE UR QL SCN>300 NG/ML: NEGATIVE
MONOCYTES # BLD AUTO: 0.9 K/UL (ref 0.3–1)
MONOCYTES NFR BLD: 15.4 % (ref 4–15)
NEUTROPHILS # BLD AUTO: 2.9 K/UL (ref 1.8–7.7)
NEUTROPHILS NFR BLD: 49.2 % (ref 38–73)
NONHDLC SERPL-MCNC: 177 MG/DL
NRBC BLD-RTO: 0 /100 WBC
OPIATES UR QL SCN: NEGATIVE
PCP UR QL SCN>25 NG/ML: NEGATIVE
PHOSPHATE SERPL-MCNC: 4.4 MG/DL (ref 2.7–4.5)
PLATELET # BLD AUTO: 224 K/UL (ref 150–450)
PMV BLD AUTO: 10.2 FL (ref 9.2–12.9)
POCT GLUCOSE: 102 MG/DL (ref 70–110)
POCT GLUCOSE: 109 MG/DL (ref 70–110)
POCT GLUCOSE: 123 MG/DL (ref 70–110)
POCT GLUCOSE: 152 MG/DL (ref 70–110)
POTASSIUM SERPL-SCNC: 4.4 MMOL/L (ref 3.5–5.1)
RBC # BLD AUTO: 3.53 M/UL (ref 4–5.4)
SODIUM SERPL-SCNC: 141 MMOL/L (ref 136–145)
TOXICOLOGY INFORMATION: NORMAL
TRIGL SERPL-MCNC: 79 MG/DL (ref 30–150)
WBC # BLD AUTO: 5.96 K/UL (ref 3.9–12.7)

## 2024-12-29 PROCEDURE — 84100 ASSAY OF PHOSPHORUS: CPT | Performed by: STUDENT IN AN ORGANIZED HEALTH CARE EDUCATION/TRAINING PROGRAM

## 2024-12-29 PROCEDURE — C1751 CATH, INF, PER/CENT/MIDLINE: HCPCS

## 2024-12-29 PROCEDURE — 83735 ASSAY OF MAGNESIUM: CPT | Performed by: STUDENT IN AN ORGANIZED HEALTH CARE EDUCATION/TRAINING PROGRAM

## 2024-12-29 PROCEDURE — 80061 LIPID PANEL: CPT | Performed by: STUDENT IN AN ORGANIZED HEALTH CARE EDUCATION/TRAINING PROGRAM

## 2024-12-29 PROCEDURE — 96372 THER/PROPH/DIAG INJ SC/IM: CPT | Performed by: STUDENT IN AN ORGANIZED HEALTH CARE EDUCATION/TRAINING PROGRAM

## 2024-12-29 PROCEDURE — 80048 BASIC METABOLIC PNL TOTAL CA: CPT | Performed by: STUDENT IN AN ORGANIZED HEALTH CARE EDUCATION/TRAINING PROGRAM

## 2024-12-29 PROCEDURE — 25000003 PHARM REV CODE 250: Performed by: STUDENT IN AN ORGANIZED HEALTH CARE EDUCATION/TRAINING PROGRAM

## 2024-12-29 PROCEDURE — 82550 ASSAY OF CK (CPK): CPT | Performed by: STUDENT IN AN ORGANIZED HEALTH CARE EDUCATION/TRAINING PROGRAM

## 2024-12-29 PROCEDURE — 80307 DRUG TEST PRSMV CHEM ANLYZR: CPT | Performed by: NURSE PRACTITIONER

## 2024-12-29 PROCEDURE — 63600175 PHARM REV CODE 636 W HCPCS: Performed by: STUDENT IN AN ORGANIZED HEALTH CARE EDUCATION/TRAINING PROGRAM

## 2024-12-29 PROCEDURE — 36410 VNPNXR 3YR/> PHY/QHP DX/THER: CPT

## 2024-12-29 PROCEDURE — 11000001 HC ACUTE MED/SURG PRIVATE ROOM

## 2024-12-29 PROCEDURE — 25000003 PHARM REV CODE 250: Performed by: EMERGENCY MEDICINE

## 2024-12-29 PROCEDURE — 83036 HEMOGLOBIN GLYCOSYLATED A1C: CPT | Performed by: STUDENT IN AN ORGANIZED HEALTH CARE EDUCATION/TRAINING PROGRAM

## 2024-12-29 PROCEDURE — 85025 COMPLETE CBC W/AUTO DIFF WBC: CPT | Performed by: STUDENT IN AN ORGANIZED HEALTH CARE EDUCATION/TRAINING PROGRAM

## 2024-12-29 RX ADMIN — CARVEDILOL 3.12 MG: 3.12 TABLET, FILM COATED ORAL at 09:12

## 2024-12-29 RX ADMIN — CEFTRIAXONE 1 G: 1 INJECTION, POWDER, FOR SOLUTION INTRAMUSCULAR; INTRAVENOUS at 09:12

## 2024-12-29 RX ADMIN — HEPARIN SODIUM 5000 UNITS: 5000 INJECTION INTRAVENOUS; SUBCUTANEOUS at 02:12

## 2024-12-29 RX ADMIN — HEPARIN SODIUM 5000 UNITS: 5000 INJECTION INTRAVENOUS; SUBCUTANEOUS at 09:12

## 2024-12-29 RX ADMIN — CARBAMAZEPINE 400 MG: 100 TABLET, EXTENDED RELEASE ORAL at 09:12

## 2024-12-29 RX ADMIN — HEPARIN SODIUM 5000 UNITS: 5000 INJECTION INTRAVENOUS; SUBCUTANEOUS at 06:12

## 2024-12-29 NOTE — HPI
89-year-old female dementia who resides at [facility], seizure,Alzheimer disease     Hx of breast cancer, CKD, Type 2 DM ( diet controlled), HLD, HTN, Hx of CVA, who resides in a nursing home since last 6 months presenting for evalution of lethargy.    History is limited due to patient's mental status. Her grand-daughter at bedside, patient has become increasinly lethargic and was curled up in a fetal position today therefore she was sent to the ER. Patient has a few papers from nursing home which report chief complaint of abdominal pain. Granddaughter also reports that her uncle called patient at [place] and she was not her usual self. She also recently realized that the nursing home has started to give the patient Valium around the clock since December 19th. Reportedly about a week ago she had an episode of acting out and since then they have kept her sedated. Patient does not report any compliants but is an unreliable historian.  Initial Exam in the ED   Vitals [12/28/24 1244]  BP Pulse Resp Temp SpO2  138/63 60 18 98.3 °F (36.8 °C) 97 %  Neurological: She is alert. She has normal strength. No cranial nerve deficit. GCS score is 15. GCS eye subscore is 4. GCS verbal subscore is 5. GCS motor subscore is 6.   Labs mainly notable for UA with bateria and Leucocyte esterases, rest unremarkable GFR was 35  CT head shows no acute findings ( chronic empty sella )  CT Chest, Abdomen Pelvis - Significant contrast extravasation within the proximal aspect of the left forearm.  Clinical correlation and follow-up are advised ( her IV was removed and replaced)   2. Minimal dependent density and atelectatic changes at the lung bases without evidence of focal consolidation.  Small 6 mm nodular opacity or pulmonary nodule seen at the right lung base.  Potential mild focal infectious or inflammatory process is a possibility.    Patient was treated   cefTRIAXone injection 2 g (2 g Intravenous Given 12/28/24  1623)  labetaloL injection 10 mg (10 mg Intravenous Given 12/28/24 1709)  acetaminophen tablet 650 mg (650 mg Oral Given 12/28/24 9719)    Patient was admitted for UTI

## 2024-12-29 NOTE — ED NOTES
Report received from MAYELIN Steele. Care taken over. Pt awake and alert; resting quietly on stretcher.  Pt remains on continuous cardiac and pulse ox monitoring with non-invasive blood pressure to cycle every 30 minutes. Pt is hypertensive. Pt denies pain at this time; no acute distress or discomfort reported or observed.  Pt is connected to purewick and is able to reposition self on stretcher. Bed locked in lowest position; side rails up and locked x 2; call light, bedside table, and personal belongings within reach. Room assessed for safety measures and cleanliness; no action needed at this time. Daughter at bedside.

## 2024-12-29 NOTE — NURSING
[facility] [place]  Nurses Note -- 4 Eyes      12/29/2024       Skin assessed on: Q Shift      [x] No Pressure Injuries Present    [x]Prevention Measures Documented    [] Yes LDA  for Pressure Injury Previously documented     [] Yes New Pressure Injury Discovered   [] LDA for New Pressure Injury Added      Attending RN:  Myra Cuenca RN     Second RN:  Yaneth

## 2024-12-29 NOTE — ASSESSMENT & PLAN NOTE
Patient has poor baseline mental status but reportedly off her usual self since this AM. Likely contributed my medications - Benzos that are known to cause sedation. Suspect secondary to metabolic derangements from UTI may be contributing  CT head with no acute structural changes  Plan:  - Treatment for UTI with ctx  - Additiona Delirium precautions: avoid offending meds (anticholinergic, antihistamine, benzo, anti-dopamine), promote sleep-wake cycles

## 2024-12-29 NOTE — ASSESSMENT & PLAN NOTE
Patient's FSGs are controlled on current medication regimen.  Last A1c reviewed-   Lab Results   Component Value Date    HGBA1C 5.8 (H) 08/06/2023   Current correctional scale  Low  Antihyperglycemics (From admission, onward)      Start     Stop Route Frequency Ordered    12/28/24 2206  insulin aspart U-100 pen 0-5 Units         -- SubQ Before meals & nightly PRN 12/28/24 2107          Hold Oral hypoglycemics while patient is in the hospital.

## 2024-12-29 NOTE — PROGRESS NOTES
[facility] Medicine  Progress Note    Patient Name: Jossie Morejon  MRN: [id number]  Patient Class: OP- Observation   Admission Date: 12/28/2024  Length of Stay: 0 days  Attending Physician: Marianne Loza,*  Primary Care Provider: Sammy Woody MD        Subjective     Principal Problem:Disorientation        HPI:  89-year-old female dementia who resides at [facility], seizure,Alzheimer disease     Hx of breast cancer, CKD, Type 2 DM ( diet controlled), HLD, HTN, Hx of CVA, who resides in a nursing home since last 6 months presenting for evalution of lethargy.    History is limited due to patient's mental status. Her grand-daughter at bedside, patient has become increasinly lethargic and was curled up in a fetal position today therefore she was sent to the ER. Patient has a few papers from nursing home which report chief complaint of abdominal pain. Granddaughter also reports that her uncle called patient at [facility] and she was not her usual self. She also recently realized that the nursing home has started to give the patient Valium around the clock since December 19th. Reportedly about a week ago she had an episode of acting out and since then they have kept her sedated. Patient does not report any compliants but is an unreliable historian.  Initial Exam in the ED   Vitals [12/28/24 1244]  BP Pulse Resp Temp SpO2  138/63 60 18 98.3 °F (36.8 °C) 97 %  Neurological: She is alert. She has normal strength. No cranial nerve deficit. GCS score is 15. GCS eye subscore is 4. GCS verbal subscore is 5. GCS motor subscore is 6.   Labs mainly notable for UA with bateria and Leucocyte esterases, rest unremarkable GFR was 35  CT head shows no acute findings ( chronic empty sella )  CT Chest, Abdomen Pelvis - Significant contrast extravasation within the proximal aspect of the left forearm.  Clinical correlation and follow-up are advised ( her IV was removed and replaced)   2. Minimal  dependent density and atelectatic changes at the lung bases without evidence of focal consolidation.  Small 6 mm nodular opacity or pulmonary nodule seen at the right lung base.  Potential mild focal infectious or inflammatory process is a possibility.    Patient was treated   cefTRIAXone injection 2 g (2 g Intravenous Given 12/28/24 1623)  labetaloL injection 10 mg (10 mg Intravenous Given 12/28/24 1709)  acetaminophen tablet 650 mg (650 mg Oral Given 12/28/24 1849)    Patient was admitted for UTI      Overview/Hospital Course:  No notes on file    Interval History: dementia . Oriented to self only . No family at bedside.   Review of Systems   Unable to perform ROS: Dementia     Objective:     Vital Signs (Most Recent):  Temp: 97.7 °F (36.5 °C) (12/29/24 0742)  Pulse: (!) 59 (12/29/24 0742)  Resp: 16 (12/29/24 0742)  BP: 138/67 (12/29/24 0742)  SpO2: 96 % (12/29/24 0742) Vital Signs (24h Range):  Temp:  [97.6 °F (36.4 °C)-98.3 °F (36.8 °C)] 97.7 °F (36.5 °C)  Pulse:  [58-82] 59  Resp:  [16-29] 16  SpO2:  [91 %-97 %] 96 %  BP: (138-218)/(63-95) 138/67     Weight: 64.9 kg (143 lb 1.3 oz)  Body mass index is 27.94 kg/m².    Intake/Output Summary (Last 24 hours) at 12/29/2024 0849  Last data filed at 12/29/2024 0639  Gross per 24 hour   Intake --   Output 500 ml   Net -500 ml         Physical Exam  Vitals reviewed.   Constitutional:       General: She is not in acute distress.     Appearance: She is well-developed. She is not diaphoretic.   Eyes:      General: No scleral icterus.  Neck:      Thyroid: No thyromegaly.   Cardiovascular:      Rate and Rhythm: Normal rate and regular rhythm.      Heart sounds: No murmur heard.  Pulmonary:      Effort: Pulmonary effort is normal.      Breath sounds: Normal breath sounds. No stridor. No wheezing or rales.   Abdominal:      General: There is no distension.      Palpations: Abdomen is soft. There is no mass.      Tenderness: There is no guarding.   Musculoskeletal:          General: Normal range of motion.      Cervical back: Normal range of motion and neck supple.   Skin:     General: Skin is warm and dry.      Capillary Refill: Capillary refill takes less than 2 seconds.   Neurological:      Mental Status: She is alert. She is disoriented.      Comments: Only able to state name .   Difficult exam due to dementia.   Move all extremities  Above to raise both arms however left arm not able to go above head  CHIVO edema and erythema   Psychiatric:         Mood and Affect: Mood normal.         Behavior: Behavior normal.             Significant Labs: All pertinent labs within the past 24 hours have been reviewed.    Significant Imaging: I have reviewed all pertinent imaging results/findings within the past 24 hours.    Assessment and Plan     * Disorientation  Patient has poor baseline mental status but reportedly off her usual self since this AM. Likely contributed my medications - Benzos that are known to cause sedation. Suspect secondary to metabolic derangements from UTI may be contributing  CT head with no acute structural changes  Baseline oriented to self only  Plan:  - Treatment for UTI with ctx  - Additiona Delirium precautions: avoid offending meds (anticholinergic, antihistamine, benzo, anti-dopamine), promote sleep-wake cycles  - will contact Nursing home and family     Left arm swelling  Unclear and nurse not aware.   IV infiltration>  Will get US  Warm compress and elevate      Urinary tract infection without hematuria  Patient with altered mental status and UA suggestive of infection. She is unable to communicate regarding symptoms therefore will treat with 3 days of ceftriaxone  Monitor fever curve and leucocytosis      Seizure disorder  Continue Carbamezapine      HTN, goal below 140/90  Patient's blood pressure range in the last 24 hours was: BP  Min: 138/63  Max: 218/84.The patient's inpatient anti-hypertensive regimen is listed below:  Current Antihypertensives  , 2 times  daily with meals, Oral  carvediloL tablet 3.125 mg, 2 times daily, Oral  labetaloL injection 10 mg, Every 6 hours PRN, Intravenous    Plan  - BP is uncontrolled, will adjust as follows: restart home meds  - PRN Labetolol for BP > 180/120 mm Hg    Chronic kidney disease, stage III (moderate)  Creatine stable for now. BMP reviewed- noted Estimated Creatinine Clearance: 22.4 mL/min (based on SCr of 1.4 mg/dL). according to latest data. Based on current GFR, CKD stage is stage 3 - GFR 30-59.  Monitor UOP and serial BMP and adjust therapy as needed. Renally dose meds. Avoid nephrotoxic medications and procedures.    Diabetes mellitus with renal manifestations, controlled  Patient's FSGs are controlled on current medication regimen.  Last A1c reviewed-   Lab Results   Component Value Date    HGBA1C 5.8 (H) 08/06/2023   Current correctional scale  Low  Antihyperglycemics (From admission, onward)      Start     Stop Route Frequency Ordered    12/28/24 2206  insulin aspart U-100 pen 0-5 Units         -- SubQ Before meals & nightly PRN 12/28/24 2107          Hold Oral hypoglycemics while patient is in the hospital.    History of breast cancer  Noted on chart review        VTE Risk Mitigation (From admission, onward)           Ordered     heparin (porcine) injection 5,000 Units  Every 8 hours         12/28/24 2107     IP VTE HIGH RISK PATIENT  Once         12/28/24 2107     Place sequential compression device  Until discontinued         12/28/24 2107                    Discharge Planning   DEDE:      Code Status: Full Code   Medical Readiness for Discharge Date:                            Jocelin Shankar NP  Department of Hospital Medicine   [facility]

## 2024-12-29 NOTE — ASSESSMENT & PLAN NOTE
Creatine stable for now. BMP reviewed- noted Estimated Creatinine Clearance: 22.4 mL/min (based on SCr of 1.4 mg/dL). according to latest data. Based on current GFR, CKD stage is stage 3 - GFR 30-59.  Monitor UOP and serial BMP and adjust therapy as needed. Renally dose meds. Avoid nephrotoxic medications and procedures.

## 2024-12-29 NOTE — SUBJECTIVE & OBJECTIVE
Interval History: dementia . Oriented to self only . No family at bedside.   Review of Systems   Unable to perform ROS: Dementia     Objective:     Vital Signs (Most Recent):  Temp: 97.7 °F (36.5 °C) (12/29/24 0742)  Pulse: (!) 59 (12/29/24 0742)  Resp: 16 (12/29/24 0742)  BP: 138/67 (12/29/24 0742)  SpO2: 96 % (12/29/24 0742) Vital Signs (24h Range):  Temp:  [97.6 °F (36.4 °C)-98.3 °F (36.8 °C)] 97.7 °F (36.5 °C)  Pulse:  [58-82] 59  Resp:  [16-29] 16  SpO2:  [91 %-97 %] 96 %  BP: (138-218)/(63-95) 138/67     Weight: 64.9 kg (143 lb 1.3 oz)  Body mass index is 27.94 kg/m².    Intake/Output Summary (Last 24 hours) at 12/29/2024 0849  Last data filed at 12/29/2024 0639  Gross per 24 hour   Intake --   Output 500 ml   Net -500 ml         Physical Exam  Vitals reviewed.   Constitutional:       General: She is not in acute distress.     Appearance: She is well-developed. She is not diaphoretic.   Eyes:      General: No scleral icterus.  Neck:      Thyroid: No thyromegaly.   Cardiovascular:      Rate and Rhythm: Normal rate and regular rhythm.      Heart sounds: No murmur heard.  Pulmonary:      Effort: Pulmonary effort is normal.      Breath sounds: Normal breath sounds. No stridor. No wheezing or rales.   Abdominal:      General: There is no distension.      Palpations: Abdomen is soft. There is no mass.      Tenderness: There is no guarding.   Musculoskeletal:         General: Normal range of motion.      Cervical back: Normal range of motion and neck supple.   Skin:     General: Skin is warm and dry.      Capillary Refill: Capillary refill takes less than 2 seconds.   Neurological:      Mental Status: She is alert. She is disoriented.      Comments: Only able to state name .   Difficult exam due to dementia.   Move all extremities  Above to raise both arms however left arm not able to go above head  CHIVO edema and erythema   Psychiatric:         Mood and Affect: Mood normal.         Behavior: Behavior normal.              Significant Labs: All pertinent labs within the past 24 hours have been reviewed.    Significant Imaging: I have reviewed all pertinent imaging results/findings within the past 24 hours.

## 2024-12-29 NOTE — SUBJECTIVE & OBJECTIVE
Past Medical History:   Diagnosis Date    Alzheimer disease     Breast cancer     CKD (chronic kidney disease)     Diabetes mellitus type II     Dyslipidemia     Dyslipidemia     Essential hypertension, benign     History of CVA (cerebrovascular accident)     Hypercholesteremia     Microalbuminuria     Mild anemia     Seizure disorder        History reviewed. No pertinent surgical history.    Review of patient's allergies indicates:  No Known Allergies    No current facility-administered medications on file prior to encounter.     Current Outpatient Medications on File Prior to Encounter   Medication Sig    [DISCONTINUED] LORazepam (ATIVAN) 0.5 MG tablet Take by mouth.    carBAMazepine (TEGRETOL XR) 400 MG Tb12 Take 400 mg by mouth 2 (two) times daily.    carvediloL (COREG) 3.125 MG tablet Take 3.125 mg by mouth 2 (two) times daily with meals.    [DISCONTINUED] acetaminophen (TYLENOL) 500 MG tablet Take 1 tablet (500 mg total) by mouth every 6 (six) hours as needed for Pain (As needed for mild-to-moderate pain).    [DISCONTINUED] amLODIPine (NORVASC) 10 MG tablet TAKE 1 TABLET(10 MG) BY MOUTH EVERY DAY    [DISCONTINUED] apixaban (ELIQUIS) 5 mg (74 tabs) DsPk For the first 7 days take two 5 mg tablets twice daily.  After 7 days take one 5 mg tablet twice daily.    [DISCONTINUED] ascorbic acid, vitamin C, (VITAMIN C) 500 MG tablet Take 1 tablet (500 mg total) by mouth 2 (two) times daily.    [DISCONTINUED] blood sugar diagnostic Strp 1 strip by Misc.(Non-Drug; Combo Route) route 3 (three) times daily.    [DISCONTINUED] blood-glucose meter Misc 1 Device by Misc.(Non-Drug; Combo Route) route 3 (three) times daily.    [DISCONTINUED] carBAMazepine (CARBATROL) 200 MG CM12 TAKE 1 CAPSULE(200 MG) BY MOUTH TWICE DAILY    [DISCONTINUED] carvediloL (COREG) 3.125 MG tablet Take 1 tablet (3.125 mg total) by mouth 2 (two) times daily.    [DISCONTINUED] diclofenac sodium (VOLTAREN) 1 % Gel Apply 2 g topically 4 (four) times daily  "as needed (Pain).    [DISCONTINUED] donepeziL (ARICEPT) 5 MG tablet Take 1 tablet (5 mg total) by mouth once daily.    [DISCONTINUED] ergocalciferol (VITAMIN D2) 50,000 unit Cap Take 1 capsule (50,000 Units total) by mouth every 7 days.    [DISCONTINUED] insulin syringe-needle U-100 (INS SYRINGE/NEEDLE 0.5 ML 27 G) 1/2 mL 27 gauge x 1/2" Syrg 1 each by Misc.(Non-Drug; Combo Route) route 2 (two) times daily.    [DISCONTINUED] LIDOcaine (LIDODERM) 5 % Place 1 patch onto the skin once daily. Remove & Discard patch within 12 hours then leave off for 12 hours    [DISCONTINUED] multivitamin Tab Take 1 tablet by mouth once daily.    [DISCONTINUED] pravastatin (PRAVACHOL) 40 MG tablet Take 1 tablet (40 mg total) by mouth every evening.    [DISCONTINUED] UNIFINE PENTIPS 32 gauge x 1/4" Ndle 2 (two) times daily.     Family History       Problem Relation (Age of Onset)    Diabetes Mother          Tobacco Use    Smoking status: Never    Smokeless tobacco: Never   Substance and Sexual Activity    Alcohol use: No    Drug use: No    Sexual activity: Never     Review of Systems  Objective:     Vital Signs (Most Recent):  Temp: 98.3 °F (36.8 °C) (12/28/24 1244)  Pulse: 71 (12/28/24 2120)  Resp: 19 (12/28/24 1854)  BP: (!) 144/72 (12/28/24 2143)  SpO2: (!) 91 % (12/28/24 1745) Vital Signs (24h Range):  Temp:  [98.3 °F (36.8 °C)] 98.3 °F (36.8 °C)  Pulse:  [58-71] 71  Resp:  [17-29] 19  SpO2:  [91 %-97 %] 91 %  BP: (138-218)/(63-95) 144/72     Weight: 70 kg (154 lb 5.2 oz)  Body mass index is 30.14 kg/m².     Physical Exam  Vitals reviewed.   Constitutional:       General: She is not in acute distress.     Appearance: She is well-developed. She is not diaphoretic.   HENT:      Head: Normocephalic and atraumatic.   Eyes:      General: No scleral icterus.  Neck:      Thyroid: No thyromegaly.   Cardiovascular:      Rate and Rhythm: Normal rate and regular rhythm.      Heart sounds: No murmur heard.  Pulmonary:      Effort: Pulmonary " effort is normal.      Breath sounds: Normal breath sounds. No stridor. No wheezing or rales.   Abdominal:      General: There is no distension.      Palpations: Abdomen is soft. There is no mass.      Tenderness: There is no guarding.   Musculoskeletal:         General: Normal range of motion.      Cervical back: Normal range of motion and neck supple.   Skin:     General: Skin is warm and dry.      Capillary Refill: Capillary refill takes less than 2 seconds.   Neurological:      Mental Status: She is alert. She is disoriented.   Psychiatric:         Mood and Affect: Mood normal.         Behavior: Behavior normal.                Significant Labs: All pertinent labs within the past 24 hours have been reviewed.    Significant Imaging: I have reviewed all pertinent imaging results/findings within the past 24 hours.

## 2024-12-29 NOTE — ASSESSMENT & PLAN NOTE
Patient has poor baseline mental status but reportedly off her usual self since this AM. Likely contributed my medications - Benzos that are known to cause sedation. Suspect secondary to metabolic derangements from UTI may be contributing  CT head with no acute structural changes  Baseline oriented to self only  Plan:  - Treatment for UTI with ctx  - Additiona Delirium precautions: avoid offending meds (anticholinergic, antihistamine, benzo, anti-dopamine), promote sleep-wake cycles  - will contact Nursing home and family

## 2024-12-29 NOTE — PLAN OF CARE
Problem: Skin Injury Risk Increased  Goal: Skin Health and Integrity  Outcome: Progressing     Problem: Adult Inpatient Plan of Care  Goal: Plan of Care Review  Outcome: Progressing  Goal: Optimal Comfort and Wellbeing  Outcome: Progressing  Goal: Readiness for Transition of Care  Outcome: Progressing     Problem: Infection  Goal: Absence of Infection Signs and Symptoms  Outcome: Progressing     Problem: Fall Injury Risk  Goal: Absence of Fall and Fall-Related Injury  Outcome: Progressing

## 2024-12-29 NOTE — ED NOTES
Attempted to call report on patient the telephone rang for a very longtime. Someone finally picked up. Nurse Yaneth stated she was going to call me back for report.

## 2024-12-29 NOTE — H&P
"[facility] Emergency Dept  [facility] Medicine  History & Physical    Patient Name: Jossie Morejon  MRN: [id number]  Patient Class: OP- Observation  Admission Date: 12/28/2024  Attending Physician: Marianne Loza,*   Primary Care Provider: Sammy Woody MD         Patient information was obtained from patient, relative(s), and ER records.     Subjective:     Principal Problem:Disorientation    Chief Complaint:   Chief Complaint   Patient presents with    Abdominal Pain     Pt to ED from [place] with granddaughter with c/o abdominal pain - unknown of presentation. Pt has a history of dementia and granddaughter states pt has been in a "zombie state" due to the nursing home administering 0.5 mg ativan TID. Pt denies cp, sob,  n/v./d.        HPI: 89-year-old female dementia who resides at [facility], seizure,Alzheimer disease     Hx of breast cancer, CKD, Type 2 DM ( diet controlled), HLD, HTN, Hx of CVA, who resides in a nursing home since last 6 months presenting for evalution of lethargy.    History is limited due to patient's mental status. Her grand-daughter at bedside, patient has become increasinly lethargic and was curled up in a fetal position today therefore she was sent to the ER. Patient has a few papers from nursing home which report chief complaint of abdominal pain. Granddaughter also reports that her uncle called patient at [facility] and she was not her usual self. She also recently realized that the nursing home has started to give the patient Valium around the clock since December 19th. Reportedly about a week ago she had an episode of acting out and since then they have kept her sedated. Patient does not report any compliants but is an unreliable historian.  Initial Exam in the ED   Vitals [12/28/24 1244]  BP Pulse Resp Temp SpO2  138/63 60 18 98.3 °F (36.8 °C) 97 %  Neurological: She is alert. She has normal strength. No cranial nerve deficit. GCS score is 15. GCS eye subscore " is 4. GCS verbal subscore is 5. GCS motor subscore is 6.   Labs mainly notable for UA with bateria and Leucocyte esterases, rest unremarkable GFR was 35  CT head shows no acute findings ( chronic empty sella )  CT Chest, Abdomen Pelvis - Significant contrast extravasation within the proximal aspect of the left forearm.  Clinical correlation and follow-up are advised ( her IV was removed and replaced)   2. Minimal dependent density and atelectatic changes at the lung bases without evidence of focal consolidation.  Small 6 mm nodular opacity or pulmonary nodule seen at the right lung base.  Potential mild focal infectious or inflammatory process is a possibility.    Patient was treated   cefTRIAXone injection 2 g (2 g Intravenous Given 12/28/24 1623)  labetaloL injection 10 mg (10 mg Intravenous Given 12/28/24 1709)  acetaminophen tablet 650 mg (650 mg Oral Given 12/28/24 1849)    Patient was admitted for UTI      Past Medical History:   Diagnosis Date    Alzheimer disease     Breast cancer     CKD (chronic kidney disease)     Diabetes mellitus type II     Dyslipidemia     Dyslipidemia     Essential hypertension, benign     History of CVA (cerebrovascular accident)     Hypercholesteremia     Microalbuminuria     Mild anemia     Seizure disorder        History reviewed. No pertinent surgical history.    Review of patient's allergies indicates:  No Known Allergies    No current facility-administered medications on file prior to encounter.     Current Outpatient Medications on File Prior to Encounter   Medication Sig    [DISCONTINUED] LORazepam (ATIVAN) 0.5 MG tablet Take by mouth.    carBAMazepine (TEGRETOL XR) 400 MG Tb12 Take 400 mg by mouth 2 (two) times daily.    carvediloL (COREG) 3.125 MG tablet Take 3.125 mg by mouth 2 (two) times daily with meals.    [DISCONTINUED] acetaminophen (TYLENOL) 500 MG tablet Take 1 tablet (500 mg total) by mouth every 6 (six) hours as needed for Pain (As needed for mild-to-moderate  "pain).    [DISCONTINUED] amLODIPine (NORVASC) 10 MG tablet TAKE 1 TABLET(10 MG) BY MOUTH EVERY DAY    [DISCONTINUED] apixaban (ELIQUIS) 5 mg (74 tabs) DsPk For the first 7 days take two 5 mg tablets twice daily.  After 7 days take one 5 mg tablet twice daily.    [DISCONTINUED] ascorbic acid, vitamin C, (VITAMIN C) 500 MG tablet Take 1 tablet (500 mg total) by mouth 2 (two) times daily.    [DISCONTINUED] blood sugar diagnostic Strp 1 strip by Misc.(Non-Drug; Combo Route) route 3 (three) times daily.    [DISCONTINUED] blood-glucose meter Misc 1 Device by Misc.(Non-Drug; Combo Route) route 3 (three) times daily.    [DISCONTINUED] carBAMazepine (CARBATROL) 200 MG CM12 TAKE 1 CAPSULE(200 MG) BY MOUTH TWICE DAILY    [DISCONTINUED] carvediloL (COREG) 3.125 MG tablet Take 1 tablet (3.125 mg total) by mouth 2 (two) times daily.    [DISCONTINUED] diclofenac sodium (VOLTAREN) 1 % Gel Apply 2 g topically 4 (four) times daily as needed (Pain).    [DISCONTINUED] donepeziL (ARICEPT) 5 MG tablet Take 1 tablet (5 mg total) by mouth once daily.    [DISCONTINUED] ergocalciferol (VITAMIN D2) 50,000 unit Cap Take 1 capsule (50,000 Units total) by mouth every 7 days.    [DISCONTINUED] insulin syringe-needle U-100 (INS SYRINGE/NEEDLE 0.5 ML 27 G) 1/2 mL 27 gauge x 1/2" Syrg 1 each by Misc.(Non-Drug; Combo Route) route 2 (two) times daily.    [DISCONTINUED] LIDOcaine (LIDODERM) 5 % Place 1 patch onto the skin once daily. Remove & Discard patch within 12 hours then leave off for 12 hours    [DISCONTINUED] multivitamin Tab Take 1 tablet by mouth once daily.    [DISCONTINUED] pravastatin (PRAVACHOL) 40 MG tablet Take 1 tablet (40 mg total) by mouth every evening.    [DISCONTINUED] UNIFINE PENTIPS 32 gauge x 1/4" Ndle 2 (two) times daily.     Family History       Problem Relation (Age of Onset)    Diabetes Mother          Tobacco Use    Smoking status: Never    Smokeless tobacco: Never   Substance and Sexual Activity    Alcohol use: No    Drug " use: No    Sexual activity: Never     Review of Systems  Objective:     Vital Signs (Most Recent):  Temp: 98.3 °F (36.8 °C) (12/28/24 1244)  Pulse: 71 (12/28/24 2120)  Resp: 19 (12/28/24 1854)  BP: (!) 144/72 (12/28/24 2143)  SpO2: (!) 91 % (12/28/24 1745) Vital Signs (24h Range):  Temp:  [98.3 °F (36.8 °C)] 98.3 °F (36.8 °C)  Pulse:  [58-71] 71  Resp:  [17-29] 19  SpO2:  [91 %-97 %] 91 %  BP: (138-218)/(63-95) 144/72     Weight: 70 kg (154 lb 5.2 oz)  Body mass index is 30.14 kg/m².     Physical Exam  Vitals reviewed.   Constitutional:       General: She is not in acute distress.     Appearance: She is well-developed. She is not diaphoretic.   HENT:      Head: Normocephalic and atraumatic.   Eyes:      General: No scleral icterus.  Neck:      Thyroid: No thyromegaly.   Cardiovascular:      Rate and Rhythm: Normal rate and regular rhythm.      Heart sounds: No murmur heard.  Pulmonary:      Effort: Pulmonary effort is normal.      Breath sounds: Normal breath sounds. No stridor. No wheezing or rales.   Abdominal:      General: There is no distension.      Palpations: Abdomen is soft. There is no mass.      Tenderness: There is no guarding.   Musculoskeletal:         General: Normal range of motion.      Cervical back: Normal range of motion and neck supple.   Skin:     General: Skin is warm and dry.      Capillary Refill: Capillary refill takes less than 2 seconds.   Neurological:      Mental Status: She is alert. She is disoriented.   Psychiatric:         Mood and Affect: Mood normal.         Behavior: Behavior normal.                Significant Labs: All pertinent labs within the past 24 hours have been reviewed.    Significant Imaging: I have reviewed all pertinent imaging results/findings within the past 24 hours.  Assessment/Plan:     * Disorientation  Patient has poor baseline mental status but reportedly off her usual self since this AM. Likely contributed my medications - Benzos that are known to cause  sedation. Suspect secondary to metabolic derangements from UTI may be contributing  CT head with no acute structural changes  Plan:  - Treatment for UTI with ctx  - Additiona Delirium precautions: avoid offending meds (anticholinergic, antihistamine, benzo, anti-dopamine), promote sleep-wake cycles    Urinary tract infection without hematuria  Patient with altered mental status and UA suggestive of infection. She is unable to communicate regarding symptoms therefore will treat with 3 days of ceftriaxone  Monitor fever curve and leucocytosis      Seizure disorder  Continue Carbamezapine      HTN, goal below 140/90  Patient's blood pressure range in the last 24 hours was: BP  Min: 138/63  Max: 218/84.The patient's inpatient anti-hypertensive regimen is listed below:  Current Antihypertensives  , 2 times daily with meals, Oral  carvediloL tablet 3.125 mg, 2 times daily, Oral  labetaloL injection 10 mg, Every 6 hours PRN, Intravenous    Plan  - BP is uncontrolled, will adjust as follows: restart home meds  - PRN Labetolol for BP > 180/120 mm Hg    Chronic kidney disease, stage III (moderate)  Creatine stable for now. BMP reviewed- noted Estimated Creatinine Clearance: 22.4 mL/min (based on SCr of 1.4 mg/dL). according to latest data. Based on current GFR, CKD stage is stage 3 - GFR 30-59.  Monitor UOP and serial BMP and adjust therapy as needed. Renally dose meds. Avoid nephrotoxic medications and procedures.    Diabetes mellitus with renal manifestations, controlled  Patient's FSGs are controlled on current medication regimen.  Last A1c reviewed-   Lab Results   Component Value Date    HGBA1C 5.8 (H) 08/06/2023   Current correctional scale  Low  Antihyperglycemics (From admission, onward)      Start     Stop Route Frequency Ordered    12/28/24 2206  insulin aspart U-100 pen 0-5 Units         -- SubQ Before meals & nightly PRN 12/28/24 2107          Hold Oral hypoglycemics while patient is in the hospital.    History of  breast cancer  Noted on chart review        VTE Risk Mitigation (From admission, onward)           Ordered     heparin (porcine) injection 5,000 Units  Every 8 hours         12/28/24 2107     IP VTE HIGH RISK PATIENT  Once         12/28/24 2107     Place sequential compression device  Until discontinued         12/28/24 2107                       On 12/28/2024, patient should be placed in hospital observation services under my care.             Elizabeth Carrillo MD  Department of Hospital Medicine  [facility] [place] - Emergency Dept

## 2024-12-29 NOTE — ASSESSMENT & PLAN NOTE
Patient with altered mental status and UA suggestive of infection. She is unable to communicate regarding symptoms therefore will treat with 3 days of ceftriaxone  Monitor fever curve and leucocytosis

## 2024-12-29 NOTE — PHARMACY MED REC
"Admission Medication History     The home medication history was taken by Iona Underwood.    You may go to "Admission" then "Reconcile Home Medications" tabs to review and/or act upon these items.     The home medication list has been updated by the Pharmacy department.   Please read ALL comments highlighted in yellow.   Please address this information as you see fit.    Feel free to contact us if you have any questions or require assistance.      The medications listed below were removed from the home medication list. Please reorder if appropriate:  Patient reports no longer taking the following medication(s):  amlodipine  voltaren  Aricept  Vitamin D  Lidoderm patch  Multivitamin  Pravastatin      Medications listed below were obtained from: [employer] [employer] [facility] Nursing home  (Not in a hospital admission)            Iona Underwood  [phone]                  .          "

## 2024-12-29 NOTE — ASSESSMENT & PLAN NOTE
Patient's blood pressure range in the last 24 hours was: BP  Min: 138/63  Max: 218/84.The patient's inpatient anti-hypertensive regimen is listed below:  Current Antihypertensives  , 2 times daily with meals, Oral  carvediloL tablet 3.125 mg, 2 times daily, Oral  labetaloL injection 10 mg, Every 6 hours PRN, Intravenous    Plan  - BP is uncontrolled, will adjust as follows: restart home meds  - PRN Labetolol for BP > 180/120 mm Hg

## 2024-12-29 NOTE — PLAN OF CARE
Case Management Assessment - [facility] [place]    PCP: Dr. Frye  Pharmacy: unknown at this time     Patient Arrived From: [facility]   Existing Help at Home: facility staff    Barriers to Discharge: none at this time     Discharge Plan:    A. [facility]   B. [facility]    SW met with patient and family to conduct an assessment. Patient confused/ alter mental status. Family completed assessed. Patient is a resident at [facility]. Mobility wheelchair level. Family shared patient arrived to the hospital in a car from the facility and assume discharge transportation will be the same. Pharmacy unknown, per family.    12/29/24 1421   Discharge Assessment   Assessment Type Discharge Planning Assessment   Confirmed/corrected address, phone number and insurance Yes   Confirmed Demographics Correct on Facesheet   Source of Information patient;family   People in Home child(matilde), adult   Do you expect to return to your current living situation? Yes   Do you have help at home or someone to help you manage your care at home? Yes   Who are your caregiver(s) and their phone number(s)? [place] [phone]   Prior to hospitilization cognitive status: Unable to Assess   Current cognitive status: Unable to Assess   Walking or Climbing Stairs Difficulty yes   Do you have prescription coverage? Yes   Do you have any problems affording any of your prescribed medications? No   Are you on dialysis? No   Do you take coumadin? No   Discharge Plan A Skilled Nursing Facility   Discharge Plan B Skilled Nursing Facility   DME Needed Upon Discharge  none

## 2024-12-29 NOTE — PLAN OF CARE
Inpatient Upgrade Note    Jossie Morejon has warranted treatment spanning two or more midnights of hospital level care for the management of  UTI . She continues to require IV antibiotics. Her condition is also complicated by the following comorbidities: Hypertension, Diabetes, Chronic kidney disease, and CVA .

## 2024-12-29 NOTE — NURSING
Patient ate breakfast without any complications this morning before swallow screen could be done.

## 2024-12-30 VITALS
TEMPERATURE: 98 F | OXYGEN SATURATION: 98 % | BODY MASS INDEX: 28.09 KG/M2 | HEIGHT: 60 IN | DIASTOLIC BLOOD PRESSURE: 72 MMHG | HEART RATE: 53 BPM | WEIGHT: 143.06 LBS | SYSTOLIC BLOOD PRESSURE: 154 MMHG | RESPIRATION RATE: 18 BRPM

## 2024-12-30 LAB
BACTERIA UR CULT: ABNORMAL
POCT GLUCOSE: 101 MG/DL (ref 70–110)
POCT GLUCOSE: 122 MG/DL (ref 70–110)

## 2024-12-30 PROCEDURE — 25000003 PHARM REV CODE 250: Performed by: STUDENT IN AN ORGANIZED HEALTH CARE EDUCATION/TRAINING PROGRAM

## 2024-12-30 PROCEDURE — 63600175 PHARM REV CODE 636 W HCPCS: Performed by: STUDENT IN AN ORGANIZED HEALTH CARE EDUCATION/TRAINING PROGRAM

## 2024-12-30 PROCEDURE — 25000003 PHARM REV CODE 250: Performed by: NURSE PRACTITIONER

## 2024-12-30 RX ORDER — CEPHALEXIN 500 MG/1
500 CAPSULE ORAL 2 TIMES DAILY
Start: 2024-12-30 | End: 2025-01-04

## 2024-12-30 RX ORDER — CARVEDILOL 3.12 MG/1
3.12 TABLET ORAL 2 TIMES DAILY
Status: DISCONTINUED | OUTPATIENT
Start: 2024-12-30 | End: 2024-12-30 | Stop reason: HOSPADM

## 2024-12-30 RX ADMIN — RIVAROXABAN 15 MG: 15 TABLET, FILM COATED ORAL at 08:12

## 2024-12-30 RX ADMIN — CARBAMAZEPINE 400 MG: 100 TABLET, EXTENDED RELEASE ORAL at 08:12

## 2024-12-30 RX ADMIN — POLYETHYLENE GLYCOL 3350 17 G: 17 POWDER, FOR SOLUTION ORAL at 08:12

## 2024-12-30 RX ADMIN — CARVEDILOL 3.12 MG: 3.12 TABLET, FILM COATED ORAL at 08:12

## 2024-12-30 RX ADMIN — HEPARIN SODIUM 5000 UNITS: 5000 INJECTION INTRAVENOUS; SUBCUTANEOUS at 05:12

## 2024-12-30 RX ADMIN — LABETALOL HYDROCHLORIDE 10 MG: 5 INJECTION INTRAVENOUS at 06:12

## 2024-12-30 NOTE — NURSING
Pt noted lying down in bed with Sitter  and Camera at the bed side .  No evidence of distress or agitation .  Report received from the off going RN.  Will continue to monitor.

## 2024-12-30 NOTE — PLAN OF CARE
Case Management Final Discharge Note      Discharge Disposition: Returning resident at [facility] [employer], [phone]    New DME ordered / company name: None    Relevant SDOH / Transition of Care Barriers:  None    Primary Caretaker and contact information: Nursing Staff; Jossie Ornelas (Daughter) [phone]       Scheduled followup appointment: Follow up appointment with PCP scheduled and added to patient AVS    Referrals placed: None    Transportation: Private vehicle/family taking patient home        Patient and family educated on discharge services and updated on DC plan.  Patient to discharge back to [facility], where she is a resident; patient follow up appointment sent to facility via [employer].  Bedside RN notified, patient clear to discharge from Case Management Perspective.    12/30/24 1233   Final Note   Assessment Type Final Discharge Note   Anticipated Discharge Disposition [facility] Resources/Appts/Education Provided Provided patient/caregiver with written discharge plan information;Appointments scheduled and added to AVS;Post-Acute resouces added to AVS   Post-Acute Status   Post-Acute Authorization Placement   Post-Acute Placement Status Set-up Complete/Auth obtained   Discharge Delays None known at this time

## 2024-12-30 NOTE — HOSPITAL COURSE
History of severe dementia transfer to ED from [facility] after found to have worsen mental status. Patient was found to have UTI. Family reported ativan TID prn started at NH which likely contributed to symptoms. Blood culture no growth so far. Urine culture grew e coli-pan sensitive.  Mental status returned to baseline patient oriented to self only, recognize family members (daughters, son , jamaal) but does not remember names. Per Ayo Sethi, patient is wheelchair bound at NH due to fall risk. Continue antibiotics Rocephin to Keflex.   Noted left arm edema on admission then resolved with elevation. US LUE non occlusive thrombosis left brachial vein. Per daughter Jossie, patient was on anticoagulation xarelto for approximately 3 months for right lower extremity DVT (February 2024).  Discussed anticoagulation risk of bleeding with Ayo Sethi.  Ayo Sethi who wished for patient to continue Xarelto.   Patient is hemodynamically stable for discharge back to [facility].  Avoid sedating medications.

## 2024-12-30 NOTE — PLAN OF CARE
[facility]     Department of Hospital Medicine     [address] [place] [address]     [phone] after hours  [phone] fax       NURSING HOME ORDERS    12/30/2024    Admit to Nursing Home:  Regular Bed   Diagnoses:  Active Hospital Problems    Diagnosis  POA    *Disorientation [R41.0]  Yes     Priority: 1 - High    Left arm swelling [M79.89]  No    Urinary tract infection without hematuria [N39.0]  Yes             Seizure disorder [G40.909]  Yes    HTN, goal below 140/90 [I10]  Yes    Chronic kidney disease, stage III (moderate) [N18.30]  Yes    Diabetes mellitus with renal manifestations, controlled [E11.29]  Yes    History of breast cancer [Z85.3]  Not Applicable      Resolved Hospital Problems   No resolved problems to display.       Patient is homebound due to:  Disorientation    Allergies:Review of patient's allergies indicates:  No Known Allergies    Vitals:       Every shift (Skilled Nursing patients)    Diet: Regular diet      Acitivities:    As PTA    LABS:  Per facility protocol    Nursing Precautions:     - Aspiration precautions:             - Total assistance with meals            -  Upright 90 degrees befor during and after meals             -  Suction at bedside          - Fall precautions per nursing home protocol   - Seizure precaution per retirement protocol   - Decubitus precautions:        -  for positioning   - Pressure reducing foam mattress   - Turn patient every two hours. Use wedge pillows to anchor patient    CONSULTS:      Physical Therapy to evaluate and treat     Occupational Therapy to evaluate and treat     Nutrition to evaluate and recommend diet     Psychiatry to evaluate and follow patients for delirium    MISCELLANEOUS CARE:                   Routine Skin for Bedridden Patients:  Apply moisture barrier cream to all    skin folds and wet areas in perineal area daily and after baths and                           all  bowel movements.                        Medications: Discontinue all previous medication orders, if any. See new list below.        Medication List        START taking these medications      cephALEXin 500 MG capsule  Commonly known as: KEFLEX  Take 1 capsule (500 mg total) by mouth 2 (two) times a day. for 5 days     * rivaroxaban 15 mg Tab  Commonly known as: XARELTO  Take 1 tablet (15 mg total) by mouth 2 (two) times daily with meals. Take 15 mg twice daily with food for 21 days (12/30/2024 -1/20/2024) followed by 20 mg once daily for 21 days     * rivaroxaban 10 mg Tab  Commonly known as: XARELTO  Take 2 tablets (20 mg total) by mouth daily with dinner or evening meal. Start date 1/21/2024  Start taking on: January 21, 2025           * This list has 2 medication(s) that are the same as other medications prescribed for you. Read the directions carefully, and ask your doctor or other care provider to review them with you.                CONTINUE taking these medications      carBAMazepine 400 MG Tb12  Commonly known as: TEGRETOL XR  Take 400 mg by mouth 2 (two) times daily.     carvediloL 3.125 MG tablet  Commonly known as: COREG  Take 3.125 mg by mouth 2 (two) times daily with meals.                    _________________________________  Jocelin Shankar NP  12/30/2024

## 2024-12-30 NOTE — NURSING
Pt resting in bed throughout the night.  Midline to DEANNA flushed and saline locked.  Purewick and brief in place.  Sitter and avasys at the bedside for safety.  No acute distress noted, pt free from falls or injury.  Bed in low position, wheels locked, bed alarm on, call light in reach for assistance, will continue to monitor.

## 2024-12-30 NOTE — PLAN OF CARE
12/30/24 0915   Medicare Message   Important Message from Medicare regarding Discharge Appeal Rights Given to patient/caregiver;Explained to patient/caregiver;Other (comments)  (Spoke with patient's daughter via telephone.  Explained IMM and she verbalized understanding. [email or web address] (Daughter)  [phone])   Date IMM was signed 12/30/24   Time IMM was signed 0908

## 2024-12-30 NOTE — NURSING
[facility]  Nurses Note -- 4 Eyes      12/29/2024       Skin assessed on: Q Shift      [x] No Pressure Injuries Present    []Prevention Measures Documented    [] Yes LDA  for Pressure Injury Previously documented     [] Yes New Pressure Injury Discovered   [] LDA for New Pressure Injury Added      Attending RN:  Martha Sykes RN     Second RN:  Anna RN

## 2024-12-30 NOTE — DISCHARGE SUMMARY
[facility] Medicine  Discharge Summary      Patient Name: Jossie Morejon  MRN: [id number]  NAIDA: [id number]  Patient Class: IP- Inpatient  Admission Date: 12/28/2024  Hospital Length of Stay: 1 days  Discharge Date and Time:  12/30/2024 1:06 PM  Attending Physician: Marianne Loza,*   Discharging Provider: Jocelin Oseguera NP  Primary Care Provider: Imtiaz Frye MD    Primary Care Team: JOCELIN OSEGUERA    HPI:   89-year-old female dementia who resides at [facility], seizure,Alzheimer disease     Hx of breast cancer, CKD, Type 2 DM ( diet controlled), HLD, HTN, Hx of CVA, who resides in a nursing home since last 6 months presenting for evalution of lethargy.    History is limited due to patient's mental status. Her grand-daughter at bedside, patient has become increasinly lethargic and was curled up in a fetal position today therefore she was sent to the ER. Patient has a few papers from nursing home which report chief complaint of abdominal pain. Granddaughter also reports that her uncle called patient at [facility] and she was not her usual self. She also recently realized that the nursing home has started to give the patient Valium around the clock since December 19th. Reportedly about a week ago she had an episode of acting out and since then they have kept her sedated. Patient does not report any compliants but is an unreliable historian.  Initial Exam in the ED   Vitals [12/28/24 1244]  BP Pulse Resp Temp SpO2  138/63 60 18 98.3 °F (36.8 °C) 97 %  Neurological: She is alert. She has normal strength. No cranial nerve deficit. GCS score is 15. GCS eye subscore is 4. GCS verbal subscore is 5. GCS motor subscore is 6.   Labs mainly notable for UA with bateria and Leucocyte esterases, rest unremarkable GFR was 35  CT head shows no acute findings ( chronic empty sella )  CT Chest, Abdomen Pelvis - Significant contrast extravasation within the proximal aspect of the left forearm.   Clinical correlation and follow-up are advised ( her IV was removed and replaced)   2. Minimal dependent density and atelectatic changes at the lung bases without evidence of focal consolidation.  Small 6 mm nodular opacity or pulmonary nodule seen at the right lung base.  Potential mild focal infectious or inflammatory process is a possibility.    Patient was treated   cefTRIAXone injection 2 g (2 g Intravenous Given 12/28/24 1623)  labetaloL injection 10 mg (10 mg Intravenous Given 12/28/24 1709)  acetaminophen tablet 650 mg (650 mg Oral Given 12/28/24 1849)    Patient was admitted for UTI      * No surgery found *      Hospital Course:   History of severe dementia transfer to ED from [facility] after found to have worsen mental status. Patient was found to have UTI. Family reported ativan TID prn started at NH which likely contributed to symptoms. Blood culture no growth so far. Urine culture grew e coli-pan sensitive.  Mental status returned to baseline patient oriented to self only, recognize family members (daughters, son , jamaal) but does not remember names. Per Shaka Sethi, patient is wheelchair bound at NH due to fall risk. Continue antibiotics Rocephin to Keflex.   Noted left arm edema on admission then resolved with elevation. US LUE non occlusive thrombosis left brachial vein. Per shaka Sethi, patient was on anticoagulation xarelto for approximately 3 months for right lower extremity DVT (February 2024).  Discussed anticoagulation risk of bleeding with Daughter Jossie.  Shaka Sethi who wished for patient to continue Xarelto.   Patient is hemodynamically stable for discharge back to [facility].  Avoid sedating medications.      Goals of Care Treatment Preferences:  Code Status: Full Code      SDOH Screening:  The patient was unable to be screened for utility difficulties, food insecurity, transport difficulties, housing insecurity, and interpersonal safety, so no  concerns could be identified this admission.     Consults:   Consults (From admission, onward)          Status Ordering Provider     Inpatient consult to Midline team  Once        Provider:  (Not yet assigned)    Acknowledged GRACE LIU            * Disorientation  Patient has poor baseline mental status but reportedly off her usual self since this AM. Likely contributed my medications - Benzos that are known to cause sedation. Suspect secondary to metabolic derangements from UTI may be contributing  CT head with no acute structural changes  Baseline oriented to self only  Plan:  - Treatment for UTI with ctx  - Additiona Delirium precautions: avoid offending meds (anticholinergic, antihistamine, benzo, anti-dopamine), promote sleep-wake cycles  - will contact Nursing home and family     Left arm swelling  Unclear and nurse not aware.   IV infiltration>  Will get US  Warm compress and elevate      Urinary tract infection without hematuria  Patient with altered mental status and UA suggestive of infection. She is unable to communicate regarding symptoms therefore will treat with 3 days of ceftriaxone  Monitor fever curve and leucocytosis      Seizure disorder  Continue Carbamezapine      HTN, goal below 140/90  Patient's blood pressure range in the last 24 hours was: BP  Min: 138/63  Max: 218/84.The patient's inpatient anti-hypertensive regimen is listed below:  Current Antihypertensives  , 2 times daily with meals, Oral  carvediloL tablet 3.125 mg, 2 times daily, Oral  labetaloL injection 10 mg, Every 6 hours PRN, Intravenous    Plan  - BP is uncontrolled, will adjust as follows: restart home meds  - PRN Labetolol for BP > 180/120 mm Hg    Chronic kidney disease, stage III (moderate)  Creatine stable for now. BMP reviewed- noted Estimated Creatinine Clearance: 22.4 mL/min (based on SCr of 1.4 mg/dL). according to latest data. Based on current GFR, CKD stage is stage 3 - GFR 30-59.  Monitor UOP and serial  BMP and adjust therapy as needed. Renally dose meds. Avoid nephrotoxic medications and procedures.    Diabetes mellitus with renal manifestations, controlled  Patient's FSGs are controlled on current medication regimen.  Last A1c reviewed-   Lab Results   Component Value Date    HGBA1C 5.8 (H) 08/06/2023   Current correctional scale  Low  Antihyperglycemics (From admission, onward)      Start     Stop Route Frequency Ordered    12/28/24 2206  insulin aspart U-100 pen 0-5 Units         -- SubQ Before meals & nightly PRN 12/28/24 2107          Hold Oral hypoglycemics while patient is in the hospital.    History of breast cancer  Noted on chart review        Final Active Diagnoses:    Diagnosis Date Noted POA    PRINCIPAL PROBLEM:  Disorientation [R41.0] 12/28/2024 Yes    Left arm swelling [M79.89] 12/29/2024 No    Urinary tract infection without hematuria [N39.0] 12/28/2024 Yes    Seizure disorder [G40.909] 01/17/2019 Yes    HTN, goal below 140/90 [I10] 03/19/2018 Yes    Chronic kidney disease, stage III (moderate) [N18.30] 02/19/2015 Yes    Diabetes mellitus with renal manifestations, controlled [E11.29] 04/15/2013 Yes    History of breast cancer [Z85.3] 01/16/2013 Not Applicable      Problems Resolved During this Admission:       Discharged Condition: stable    Disposition: Another Health Care Inst*    Follow Up:   Follow-up Information       Imtiaz Peguero MD. Go on 1/9/2025.    Specialty: Internal Medicine  Why: Appointment scheduled for 9:30am  Contact information:  [address] [place] [address] [address]  [facility] [address]  [phone]               [facility] [place], [facility] Follow up.    Specialty: Skilled Nursing Facility  Contact information:  [address] [place] [id number] [phone]                         Patient Instructions:      Ambulatory referral/consult to Outpatient Case Management   Referral Priority: Routine Referral Type: Consultation   Referral Reason:  Specialty Services Required   Number of Visits Requested: 1     Diet Cardiac     Notify your health care provider if you experience any of the following:  temperature >100.4     Notify your health care provider if you experience any of the following:  difficulty breathing or increased cough     Notify your health care provider if you experience any of the following:  increased confusion or weakness     Activity as tolerated       Significant Diagnostic Studies: N/A    Pending Diagnostic Studies:       Procedure Component Value Units Date/Time    Basic Metabolic Panel (BMP) [[id number]]     Order Status: Sent Lab Status: No result     Specimen: Blood     CBC with Automated Differential [[id number]]     Order Status: Sent Lab Status: No result     Specimen: Blood     Magnesium [[id number]]     Order Status: Sent Lab Status: No result     Specimen: Blood     Phosphorus [[id number]]     Order Status: Sent Lab Status: No result     Specimen: Blood            Medications:  Reconciled Home Medications:      Medication List        START taking these medications      cephALEXin 500 MG capsule  Commonly known as: KEFLEX  Take 1 capsule (500 mg total) by mouth 2 (two) times a day. for 5 days     * rivaroxaban 15 mg Tab  Commonly known as: XARELTO  Take 1 tablet (15 mg total) by mouth 2 (two) times daily with meals. Take 15 mg twice daily with food for 21 days (12/30/2024 -1/20/2024) followed by 20 mg once daily for 21 days     * rivaroxaban 10 mg Tab  Commonly known as: XARELTO  Take 2 tablets (20 mg total) by mouth daily with dinner or evening meal. Start date 1/21/2024  Start taking on: January 21, 2025           * This list has 2 medication(s) that are the same as other medications prescribed for you. Read the directions carefully, and ask your doctor or other care provider to review them with you.                CONTINUE taking these medications      carBAMazepine 400 MG Tb12  Commonly known as: TEGRETOL XR  Take 400 mg by  mouth 2 (two) times daily.     carvediloL 3.125 MG tablet  Commonly known as: COREG  Take 3.125 mg by mouth 2 (two) times daily with meals.              Indwelling Lines/Drains at time of discharge:   Lines/Drains/Airways       Drain  Duration             Female External Urinary Catheter w/ Suction 12/28/24 1500 1 day                    Time spent on the discharge of patient: 35 minutes         Jocelin Shankar NP  Department of Hospital Medicine  [facility]

## 2024-12-30 NOTE — PLAN OF CARE
[facility] FOLLOW UP APPOINTMENT          The following appointments have been scheduled for patient by Case Management.   Post Acute Facility to arrange patient transportation to and from specialty appointments during patient stay at facility.           Follow-up Information       Imtiaz Peguero MD. Go on 1/9/2025.   Specialty: Internal Medicine  Why: Appointment scheduled for 9:30am  Contact information:  [address] [place] [address] [address]  [place] [id number]  [phone]

## 2024-12-30 NOTE — NURSING
[facility] [place]  Nurses Note -- 4 Eyes      12/30/2024       Skin assessed on: Q Shift      [x] No Pressure Injuries Present    [x]Prevention Measures Documented    [] Yes LDA  for Pressure Injury Previously documented     [] Yes New Pressure Injury Discovered   [] LDA for New Pressure Injury Added      Attending RN:  Myra Cuenca RN     Second RN:  Martha

## 2024-12-30 NOTE — PLAN OF CARE
Encourage pt to TCDB for comfort; continue WA to reorient to place and time.  Keep Sitter at bedside when pt is aggitated to prevent fall injuries.  Encourage diet and fluid intake .  Skin assessment every shift.  Problem: Skin Injury Risk Increased  Goal: Skin Health and Integrity  Outcome: Progressing     Problem: Adult Inpatient Plan of Care  Goal: Plan of Care Review  Outcome: Progressing  Goal: Patient-Specific Goal (Individualized)  Outcome: Progressing  Goal: Absence of Hospital-Acquired Illness or Injury  Outcome: Progressing  Goal: Optimal Comfort and Wellbeing  Outcome: Progressing

## 2025-01-01 LAB
BACTERIA BLD CULT: NORMAL
BACTERIA BLD CULT: NORMAL
OHS QRS DURATION: 90 MS
OHS QTC CALCULATION: 451 MS

## 2025-01-09 ENCOUNTER — OUTPATIENT CASE MANAGEMENT (OUTPATIENT)
Dept: ADMINISTRATIVE | Facility: OTHER | Age: OVER 89
End: 2025-01-09
Payer: MEDICARE

## 2025-01-09 NOTE — PROGRESS NOTES
Outpatient Care Management  Patient Not Qualified    Patient: Jossie Morejon  MRN:  [id number]  Date of Service:  1/9/2025  Completed by:  Lyric Rodriguez RN    Chief Complaint   Patient presents with    Case Closure       Patient Summary           Reason Not Qualified:  Resides in Nursing Home  [facility].

## 2025-02-22 ENCOUNTER — HOSPITAL ENCOUNTER (INPATIENT)
Facility: HOSPITAL | Age: OVER 89
LOS: 5 days | Discharge: HOSPICE | DRG: 689 | End: 2025-02-27
Attending: STUDENT IN AN ORGANIZED HEALTH CARE EDUCATION/TRAINING PROGRAM | Admitting: STUDENT IN AN ORGANIZED HEALTH CARE EDUCATION/TRAINING PROGRAM
Payer: MEDICARE

## 2025-02-22 DIAGNOSIS — N39.0 URINARY TRACT INFECTION WITHOUT HEMATURIA, SITE UNSPECIFIED: Primary | ICD-10-CM

## 2025-02-22 DIAGNOSIS — U07.1 COVID-19: ICD-10-CM

## 2025-02-22 DIAGNOSIS — R41.82 AMS (ALTERED MENTAL STATUS): ICD-10-CM

## 2025-02-22 PROBLEM — I48.91 A-FIB: Status: ACTIVE | Noted: 2025-02-22

## 2025-02-22 LAB
ALBUMIN SERPL BCP-MCNC: 3.6 G/DL (ref 3.5–5.2)
ALLENS TEST: ABNORMAL
ALLENS TEST: NORMAL
ALP SERPL-CCNC: 212 U/L (ref 40–150)
ALT SERPL W/O P-5'-P-CCNC: 18 U/L (ref 10–44)
ANION GAP SERPL CALC-SCNC: 10 MMOL/L (ref 8–16)
AST SERPL-CCNC: 17 U/L (ref 10–40)
BACTERIA #/AREA URNS HPF: ABNORMAL /HPF
BASOPHILS # BLD AUTO: 0.01 K/UL (ref 0–0.2)
BASOPHILS NFR BLD: 0.1 % (ref 0–1.9)
BILIRUB SERPL-MCNC: 0.2 MG/DL (ref 0.1–1)
BILIRUB UR QL STRIP: NEGATIVE
BNP SERPL-MCNC: 69 PG/ML (ref 0–99)
BUN SERPL-MCNC: 36 MG/DL (ref 10–30)
CALCIUM SERPL-MCNC: 9.3 MG/DL (ref 8.7–10.5)
CHLORIDE SERPL-SCNC: 112 MMOL/L (ref 95–110)
CLARITY UR: ABNORMAL
CO2 SERPL-SCNC: 21 MMOL/L (ref 23–29)
COLOR UR: YELLOW
CREAT SERPL-MCNC: 1.7 MG/DL (ref 0.5–1.4)
CTP QC/QA: YES
CTP QC/QA: YES
DELSYS: ABNORMAL
DELSYS: NORMAL
DIFFERENTIAL METHOD BLD: ABNORMAL
EOSINOPHIL # BLD AUTO: 0.1 K/UL (ref 0–0.5)
EOSINOPHIL NFR BLD: 0.8 % (ref 0–8)
ERYTHROCYTE [DISTWIDTH] IN BLOOD BY AUTOMATED COUNT: 15 % (ref 11.5–14.5)
EST. GFR  (NO RACE VARIABLE): 28 ML/MIN/1.73 M^2
GLUCOSE SERPL-MCNC: 126 MG/DL (ref 70–110)
GLUCOSE UR QL STRIP: NEGATIVE
HCO3 UR-SCNC: 24.8 MMOL/L (ref 24–28)
HCT VFR BLD AUTO: 32.3 % (ref 37–48.5)
HGB BLD-MCNC: 9.9 G/DL (ref 12–16)
HGB UR QL STRIP: ABNORMAL
IMM GRANULOCYTES # BLD AUTO: 0.04 K/UL (ref 0–0.04)
IMM GRANULOCYTES NFR BLD AUTO: 0.6 % (ref 0–0.5)
KETONES UR QL STRIP: NEGATIVE
LDH SERPL L TO P-CCNC: 1.01 MMOL/L (ref 0.5–2.2)
LEUKOCYTE ESTERASE UR QL STRIP: ABNORMAL
LIPASE SERPL-CCNC: 28 U/L (ref 4–60)
LYMPHOCYTES # BLD AUTO: 0.6 K/UL (ref 1–4.8)
LYMPHOCYTES NFR BLD: 8.1 % (ref 18–48)
MAGNESIUM SERPL-MCNC: 2.5 MG/DL (ref 1.6–2.6)
MCH RBC QN AUTO: 29.6 PG (ref 27–31)
MCHC RBC AUTO-ENTMCNC: 30.7 G/DL (ref 32–36)
MCV RBC AUTO: 96 FL (ref 82–98)
MICROSCOPIC COMMENT: ABNORMAL
MONOCYTES # BLD AUTO: 0.8 K/UL (ref 0.3–1)
MONOCYTES NFR BLD: 11.1 % (ref 4–15)
NEUTROPHILS # BLD AUTO: 5.7 K/UL (ref 1.8–7.7)
NEUTROPHILS NFR BLD: 79.3 % (ref 38–73)
NITRITE UR QL STRIP: NEGATIVE
NON-SQ EPI CELLS #/AREA URNS HPF: 1 /HPF
NRBC BLD-RTO: 0 /100 WBC
PCO2 BLDA: 40.9 MMHG (ref 35–45)
PH SMN: 7.39 [PH] (ref 7.35–7.45)
PH UR STRIP: 7 [PH] (ref 5–8)
PHOSPHATE SERPL-MCNC: 3.1 MG/DL (ref 2.7–4.5)
PLATELET # BLD AUTO: 340 K/UL (ref 150–450)
PMV BLD AUTO: 9.5 FL (ref 9.2–12.9)
PO2 BLDA: 19 MMHG (ref 40–60)
POC BE: 0 MMOL/L
POC MOLECULAR INFLUENZA A AGN: NEGATIVE
POC MOLECULAR INFLUENZA B AGN: NEGATIVE
POC SATURATED O2: 28 % (ref 95–100)
POC TCO2: 26 MMOL/L (ref 24–29)
POTASSIUM SERPL-SCNC: 4.9 MMOL/L (ref 3.5–5.1)
PROT SERPL-MCNC: 8.4 G/DL (ref 6–8.4)
PROT UR QL STRIP: ABNORMAL
RBC # BLD AUTO: 3.35 M/UL (ref 4–5.4)
RBC #/AREA URNS HPF: 5 /HPF (ref 0–4)
SAMPLE: ABNORMAL
SAMPLE: NORMAL
SARS-COV-2 RDRP RESP QL NAA+PROBE: POSITIVE
SITE: ABNORMAL
SITE: NORMAL
SODIUM SERPL-SCNC: 143 MMOL/L (ref 136–145)
SP GR UR STRIP: 1.01 (ref 1–1.03)
SQUAMOUS #/AREA URNS HPF: 1 /HPF
TROPONIN I SERPL DL<=0.01 NG/ML-MCNC: 0.02 NG/ML (ref 0–0.03)
URN SPEC COLLECT METH UR: ABNORMAL
UROBILINOGEN UR STRIP-ACNC: NEGATIVE EU/DL
WBC # BLD AUTO: 7.19 K/UL (ref 3.9–12.7)
WBC #/AREA URNS HPF: >100 /HPF (ref 0–5)
WBC CLUMPS URNS QL MICRO: ABNORMAL

## 2025-02-22 PROCEDURE — 99900035 HC TECH TIME PER 15 MIN (STAT)

## 2025-02-22 PROCEDURE — 63600175 PHARM REV CODE 636 W HCPCS: Performed by: STUDENT IN AN ORGANIZED HEALTH CARE EDUCATION/TRAINING PROGRAM

## 2025-02-22 PROCEDURE — 87040 BLOOD CULTURE FOR BACTERIA: CPT | Performed by: STUDENT IN AN ORGANIZED HEALTH CARE EDUCATION/TRAINING PROGRAM

## 2025-02-22 PROCEDURE — 84100 ASSAY OF PHOSPHORUS: CPT | Performed by: STUDENT IN AN ORGANIZED HEALTH CARE EDUCATION/TRAINING PROGRAM

## 2025-02-22 PROCEDURE — 83880 ASSAY OF NATRIURETIC PEPTIDE: CPT | Performed by: STUDENT IN AN ORGANIZED HEALTH CARE EDUCATION/TRAINING PROGRAM

## 2025-02-22 PROCEDURE — 84484 ASSAY OF TROPONIN QUANT: CPT | Performed by: STUDENT IN AN ORGANIZED HEALTH CARE EDUCATION/TRAINING PROGRAM

## 2025-02-22 PROCEDURE — 87635 SARS-COV-2 COVID-19 AMP PRB: CPT | Performed by: STUDENT IN AN ORGANIZED HEALTH CARE EDUCATION/TRAINING PROGRAM

## 2025-02-22 PROCEDURE — 83690 ASSAY OF LIPASE: CPT | Performed by: STUDENT IN AN ORGANIZED HEALTH CARE EDUCATION/TRAINING PROGRAM

## 2025-02-22 PROCEDURE — 81000 URINALYSIS NONAUTO W/SCOPE: CPT | Performed by: STUDENT IN AN ORGANIZED HEALTH CARE EDUCATION/TRAINING PROGRAM

## 2025-02-22 PROCEDURE — 93005 ELECTROCARDIOGRAM TRACING: CPT

## 2025-02-22 PROCEDURE — 25000003 PHARM REV CODE 250: Performed by: STUDENT IN AN ORGANIZED HEALTH CARE EDUCATION/TRAINING PROGRAM

## 2025-02-22 PROCEDURE — 93010 ELECTROCARDIOGRAM REPORT: CPT | Mod: ,,, | Performed by: INTERNAL MEDICINE

## 2025-02-22 PROCEDURE — 96374 THER/PROPH/DIAG INJ IV PUSH: CPT

## 2025-02-22 PROCEDURE — 27000207 HC ISOLATION

## 2025-02-22 PROCEDURE — 85025 COMPLETE CBC W/AUTO DIFF WBC: CPT | Performed by: STUDENT IN AN ORGANIZED HEALTH CARE EDUCATION/TRAINING PROGRAM

## 2025-02-22 PROCEDURE — 87088 URINE BACTERIA CULTURE: CPT | Performed by: STUDENT IN AN ORGANIZED HEALTH CARE EDUCATION/TRAINING PROGRAM

## 2025-02-22 PROCEDURE — 87086 URINE CULTURE/COLONY COUNT: CPT | Performed by: STUDENT IN AN ORGANIZED HEALTH CARE EDUCATION/TRAINING PROGRAM

## 2025-02-22 PROCEDURE — 99285 EMERGENCY DEPT VISIT HI MDM: CPT | Mod: 25

## 2025-02-22 PROCEDURE — 12000002 HC ACUTE/MED SURGE SEMI-PRIVATE ROOM

## 2025-02-22 PROCEDURE — 83735 ASSAY OF MAGNESIUM: CPT | Performed by: STUDENT IN AN ORGANIZED HEALTH CARE EDUCATION/TRAINING PROGRAM

## 2025-02-22 PROCEDURE — 83605 ASSAY OF LACTIC ACID: CPT

## 2025-02-22 PROCEDURE — 82803 BLOOD GASES ANY COMBINATION: CPT

## 2025-02-22 PROCEDURE — 80053 COMPREHEN METABOLIC PANEL: CPT | Performed by: STUDENT IN AN ORGANIZED HEALTH CARE EDUCATION/TRAINING PROGRAM

## 2025-02-22 PROCEDURE — 87186 SC STD MICRODIL/AGAR DIL: CPT | Performed by: STUDENT IN AN ORGANIZED HEALTH CARE EDUCATION/TRAINING PROGRAM

## 2025-02-22 RX ORDER — LORAZEPAM 0.5 MG/1
1 TABLET ORAL 4 TIMES DAILY
Status: ON HOLD | COMMUNITY
Start: 2025-02-05 | End: 2025-02-27 | Stop reason: HOSPADM

## 2025-02-22 RX ORDER — SODIUM CHLORIDE 0.9 % (FLUSH) 0.9 %
2 SYRINGE (ML) INJECTION EVERY 6 HOURS PRN
Status: DISCONTINUED | OUTPATIENT
Start: 2025-02-22 | End: 2025-02-27 | Stop reason: HOSPADM

## 2025-02-22 RX ORDER — CEFTRIAXONE 1 G/1
1 INJECTION, POWDER, FOR SOLUTION INTRAMUSCULAR; INTRAVENOUS
Status: DISCONTINUED | OUTPATIENT
Start: 2025-02-23 | End: 2025-02-23

## 2025-02-22 RX ORDER — CEFTRIAXONE 2 G/1
2 INJECTION, POWDER, FOR SOLUTION INTRAMUSCULAR; INTRAVENOUS
Status: COMPLETED | OUTPATIENT
Start: 2025-02-22 | End: 2025-02-22

## 2025-02-22 RX ORDER — GABAPENTIN 300 MG/1
300 CAPSULE ORAL 3 TIMES DAILY
Status: DISCONTINUED | OUTPATIENT
Start: 2025-02-23 | End: 2025-02-26

## 2025-02-22 RX ORDER — GABAPENTIN 300 MG/1
300 CAPSULE ORAL 3 TIMES DAILY
Status: ON HOLD | COMMUNITY
Start: 2025-02-06 | End: 2025-02-27 | Stop reason: HOSPADM

## 2025-02-22 RX ORDER — CARVEDILOL 3.12 MG/1
3.12 TABLET ORAL 2 TIMES DAILY WITH MEALS
Status: DISCONTINUED | OUTPATIENT
Start: 2025-02-23 | End: 2025-02-27 | Stop reason: HOSPADM

## 2025-02-22 RX ORDER — HYDROCODONE BITARTRATE AND ACETAMINOPHEN 10; 325 MG/1; MG/1
1 TABLET ORAL EVERY 6 HOURS PRN
Refills: 0 | Status: DISCONTINUED | OUTPATIENT
Start: 2025-02-22 | End: 2025-02-23

## 2025-02-22 RX ORDER — TALC
6 POWDER (GRAM) TOPICAL NIGHTLY PRN
Status: DISCONTINUED | OUTPATIENT
Start: 2025-02-22 | End: 2025-02-27 | Stop reason: HOSPADM

## 2025-02-22 RX ORDER — RIVAROXABAN 20 MG/1
20 TABLET, FILM COATED ORAL 2 TIMES DAILY WITH MEALS
Status: ON HOLD | COMMUNITY
Start: 2025-02-14 | End: 2025-02-27 | Stop reason: HOSPADM

## 2025-02-22 RX ORDER — HEPARIN SODIUM 5000 [USP'U]/ML
5000 INJECTION, SOLUTION INTRAVENOUS; SUBCUTANEOUS EVERY 8 HOURS
Status: DISCONTINUED | OUTPATIENT
Start: 2025-02-22 | End: 2025-02-22

## 2025-02-22 RX ORDER — CARBAMAZEPINE 100 MG/1
400 TABLET, EXTENDED RELEASE ORAL 2 TIMES DAILY
Status: DISCONTINUED | OUTPATIENT
Start: 2025-02-23 | End: 2025-02-23

## 2025-02-22 RX ORDER — LORAZEPAM 0.5 MG/1
0.5 TABLET ORAL 4 TIMES DAILY
Status: DISCONTINUED | OUTPATIENT
Start: 2025-02-23 | End: 2025-02-23

## 2025-02-22 RX ORDER — ACETAMINOPHEN 650 MG/1
650 SUPPOSITORY RECTAL
Status: COMPLETED | OUTPATIENT
Start: 2025-02-22 | End: 2025-02-22

## 2025-02-22 RX ORDER — ONDANSETRON HYDROCHLORIDE 2 MG/ML
4 INJECTION, SOLUTION INTRAVENOUS EVERY 8 HOURS PRN
Status: DISCONTINUED | OUTPATIENT
Start: 2025-02-22 | End: 2025-02-27 | Stop reason: HOSPADM

## 2025-02-22 RX ORDER — ACETAMINOPHEN 325 MG/1
650 TABLET ORAL EVERY 4 HOURS PRN
Status: DISCONTINUED | OUTPATIENT
Start: 2025-02-22 | End: 2025-02-23

## 2025-02-22 RX ORDER — HYDROCODONE BITARTRATE AND ACETAMINOPHEN 5; 325 MG/1; MG/1
1 TABLET ORAL EVERY 6 HOURS PRN
Refills: 0 | Status: DISCONTINUED | OUTPATIENT
Start: 2025-02-22 | End: 2025-02-23

## 2025-02-22 RX ORDER — CEFTRIAXONE 1 G/1
1 INJECTION, POWDER, FOR SOLUTION INTRAMUSCULAR; INTRAVENOUS
Status: DISCONTINUED | OUTPATIENT
Start: 2025-02-23 | End: 2025-02-22

## 2025-02-22 RX ADMIN — ACETAMINOPHEN 650 MG: 650 SUPPOSITORY RECTAL at 08:02

## 2025-02-22 RX ADMIN — CEFTRIAXONE 2 G: 2 INJECTION, POWDER, FOR SOLUTION INTRAMUSCULAR; INTRAVENOUS at 10:02

## 2025-02-22 NOTE — Clinical Note
Diagnosis: AMS (altered mental status) [7480660]   Future Attending Provider: MENDOZA LANDIN [[id number]]   Reason for IP Medical Treatment  (Clinical interventions that can only be accomplished in the IP setting? ) :: UTI, COVID, AMS

## 2025-02-23 PROBLEM — N17.9 ACUTE RENAL FAILURE SUPERIMPOSED ON STAGE 3 CHRONIC KIDNEY DISEASE: Status: ACTIVE | Noted: 2025-02-23

## 2025-02-23 PROBLEM — N18.30 ACUTE RENAL FAILURE SUPERIMPOSED ON STAGE 3 CHRONIC KIDNEY DISEASE: Status: ACTIVE | Noted: 2025-02-23

## 2025-02-23 PROBLEM — G93.41 ACUTE METABOLIC ENCEPHALOPATHY: Status: ACTIVE | Noted: 2025-02-23

## 2025-02-23 LAB
ANION GAP SERPL CALC-SCNC: 9 MMOL/L (ref 8–16)
BASOPHILS # BLD AUTO: 0.01 K/UL (ref 0–0.2)
BASOPHILS NFR BLD: 0.1 % (ref 0–1.9)
BUN SERPL-MCNC: 32 MG/DL (ref 10–30)
CALCIUM SERPL-MCNC: 8.7 MG/DL (ref 8.7–10.5)
CARBAMAZEPINE SERPL-MCNC: 6.6 UG/ML (ref 4–12)
CHLORIDE SERPL-SCNC: 113 MMOL/L (ref 95–110)
CO2 SERPL-SCNC: 22 MMOL/L (ref 23–29)
CREAT SERPL-MCNC: 1.4 MG/DL (ref 0.5–1.4)
DIFFERENTIAL METHOD BLD: ABNORMAL
EOSINOPHIL # BLD AUTO: 0 K/UL (ref 0–0.5)
EOSINOPHIL NFR BLD: 0.1 % (ref 0–8)
ERYTHROCYTE [DISTWIDTH] IN BLOOD BY AUTOMATED COUNT: 15.1 % (ref 11.5–14.5)
EST. GFR  (NO RACE VARIABLE): 35 ML/MIN/1.73 M^2
GLUCOSE SERPL-MCNC: 130 MG/DL (ref 70–110)
HCT VFR BLD AUTO: 26.5 % (ref 37–48.5)
HGB BLD-MCNC: 8 G/DL (ref 12–16)
IMM GRANULOCYTES # BLD AUTO: 0.04 K/UL (ref 0–0.04)
IMM GRANULOCYTES NFR BLD AUTO: 0.5 % (ref 0–0.5)
LYMPHOCYTES # BLD AUTO: 0.8 K/UL (ref 1–4.8)
LYMPHOCYTES NFR BLD: 10.9 % (ref 18–48)
MAGNESIUM SERPL-MCNC: 2.4 MG/DL (ref 1.6–2.6)
MCH RBC QN AUTO: 29 PG (ref 27–31)
MCHC RBC AUTO-ENTMCNC: 30.2 G/DL (ref 32–36)
MCV RBC AUTO: 96 FL (ref 82–98)
MONOCYTES # BLD AUTO: 1 K/UL (ref 0.3–1)
MONOCYTES NFR BLD: 13.3 % (ref 4–15)
NEUTROPHILS # BLD AUTO: 5.7 K/UL (ref 1.8–7.7)
NEUTROPHILS NFR BLD: 75.1 % (ref 38–73)
NRBC BLD-RTO: 0 /100 WBC
OHS QRS DURATION: 94 MS
OHS QTC CALCULATION: 420 MS
PLATELET # BLD AUTO: 288 K/UL (ref 150–450)
PMV BLD AUTO: 9.7 FL (ref 9.2–12.9)
POCT GLUCOSE: 146 MG/DL (ref 70–110)
POTASSIUM SERPL-SCNC: 4.7 MMOL/L (ref 3.5–5.1)
RBC # BLD AUTO: 2.76 M/UL (ref 4–5.4)
SODIUM SERPL-SCNC: 144 MMOL/L (ref 136–145)
WBC # BLD AUTO: 7.62 K/UL (ref 3.9–12.7)

## 2025-02-23 PROCEDURE — 25000003 PHARM REV CODE 250: Performed by: HOSPITALIST

## 2025-02-23 PROCEDURE — 25000003 PHARM REV CODE 250: Performed by: STUDENT IN AN ORGANIZED HEALTH CARE EDUCATION/TRAINING PROGRAM

## 2025-02-23 PROCEDURE — 63600175 PHARM REV CODE 636 W HCPCS: Mod: JZ,TB | Performed by: STUDENT IN AN ORGANIZED HEALTH CARE EDUCATION/TRAINING PROGRAM

## 2025-02-23 PROCEDURE — XW033E5 INTRODUCTION OF REMDESIVIR ANTI-INFECTIVE INTO PERIPHERAL VEIN, PERCUTANEOUS APPROACH, NEW TECHNOLOGY GROUP 5: ICD-10-PCS | Performed by: HOSPITALIST

## 2025-02-23 PROCEDURE — 36415 COLL VENOUS BLD VENIPUNCTURE: CPT | Performed by: STUDENT IN AN ORGANIZED HEALTH CARE EDUCATION/TRAINING PROGRAM

## 2025-02-23 PROCEDURE — 80048 BASIC METABOLIC PNL TOTAL CA: CPT | Performed by: STUDENT IN AN ORGANIZED HEALTH CARE EDUCATION/TRAINING PROGRAM

## 2025-02-23 PROCEDURE — 36415 COLL VENOUS BLD VENIPUNCTURE: CPT | Performed by: HOSPITALIST

## 2025-02-23 PROCEDURE — 63600175 PHARM REV CODE 636 W HCPCS: Performed by: HOSPITALIST

## 2025-02-23 PROCEDURE — 27000207 HC ISOLATION

## 2025-02-23 PROCEDURE — 85025 COMPLETE CBC W/AUTO DIFF WBC: CPT | Performed by: STUDENT IN AN ORGANIZED HEALTH CARE EDUCATION/TRAINING PROGRAM

## 2025-02-23 PROCEDURE — 80156 ASSAY CARBAMAZEPINE TOTAL: CPT | Performed by: HOSPITALIST

## 2025-02-23 PROCEDURE — 83735 ASSAY OF MAGNESIUM: CPT | Performed by: STUDENT IN AN ORGANIZED HEALTH CARE EDUCATION/TRAINING PROGRAM

## 2025-02-23 PROCEDURE — 11000001 HC ACUTE MED/SURG PRIVATE ROOM

## 2025-02-23 RX ORDER — DEXTROSE MONOHYDRATE 50 MG/ML
INJECTION, SOLUTION INTRAVENOUS CONTINUOUS
Status: DISCONTINUED | OUTPATIENT
Start: 2025-02-23 | End: 2025-02-25

## 2025-02-23 RX ORDER — CEFTRIAXONE 2 G/1
2 INJECTION, POWDER, FOR SOLUTION INTRAMUSCULAR; INTRAVENOUS
Status: DISCONTINUED | OUTPATIENT
Start: 2025-02-23 | End: 2025-02-26

## 2025-02-23 RX ORDER — ACETAMINOPHEN 325 MG/1
650 TABLET ORAL EVERY 4 HOURS PRN
Status: DISCONTINUED | OUTPATIENT
Start: 2025-02-23 | End: 2025-02-27 | Stop reason: HOSPADM

## 2025-02-23 RX ADMIN — CARBAMAZEPINE 400 MG: 100 TABLET, EXTENDED RELEASE ORAL at 08:02

## 2025-02-23 RX ADMIN — ACETAMINOPHEN 650 MG: 325 TABLET ORAL at 03:02

## 2025-02-23 RX ADMIN — CEFTRIAXONE 2 G: 2 INJECTION, POWDER, FOR SOLUTION INTRAMUSCULAR; INTRAVENOUS at 09:02

## 2025-02-23 RX ADMIN — REMDESIVIR 100 MG: 100 INJECTION, POWDER, LYOPHILIZED, FOR SOLUTION INTRAVENOUS at 11:02

## 2025-02-23 RX ADMIN — RIVAROXABAN 10 MG: 10 TABLET, FILM COATED ORAL at 05:02

## 2025-02-23 RX ADMIN — GABAPENTIN 300 MG: 300 CAPSULE ORAL at 08:02

## 2025-02-23 RX ADMIN — DEXTROSE MONOHYDRATE: 50 INJECTION, SOLUTION INTRAVENOUS at 03:02

## 2025-02-23 RX ADMIN — CARVEDILOL 3.12 MG: 3.12 TABLET, FILM COATED ORAL at 08:02

## 2025-02-23 RX ADMIN — REMDESIVIR 200 MG: 100 INJECTION, POWDER, LYOPHILIZED, FOR SOLUTION INTRAVENOUS at 12:02

## 2025-02-23 RX ADMIN — LORAZEPAM 0.5 MG: 0.5 TABLET ORAL at 08:02

## 2025-02-23 RX ADMIN — GABAPENTIN 300 MG: 300 CAPSULE ORAL at 09:02

## 2025-02-23 RX ADMIN — ACETAMINOPHEN 650 MG: 325 TABLET ORAL at 06:02

## 2025-02-23 RX ADMIN — GABAPENTIN 300 MG: 300 CAPSULE ORAL at 03:02

## 2025-02-23 RX ADMIN — RIVAROXABAN 10 MG: 10 TABLET, FILM COATED ORAL at 08:02

## 2025-02-23 RX ADMIN — CARVEDILOL 3.12 MG: 3.12 TABLET, FILM COATED ORAL at 05:02

## 2025-02-23 NOTE — NURSING
[facility] [place]  Nurses Note -- 4 Eyes      2/23/2025       Skin assessed on: Q Shift      [x] No Pressure Injuries Present    [x]Prevention Measures Documented    [] Yes LDA  for Pressure Injury Previously documented     [] Yes New Pressure Injury Discovered   [] LDA for New Pressure Injury Added      Attending RN:  Viri aLnda, RN     Second RN:  Bel Valverde

## 2025-02-23 NOTE — ASSESSMENT & PLAN NOTE
Patient has poor baseline mental status but reportedly off her usual self since this AM. Likely contributed metabolic derangements from UTI may be contributing  CT head with no acute structural changes  Plan:  - Treatment for UTI with ctx  - Additiona Delirium precautions: avoid offending meds (anticholinergic, antihistamine, benzo, anti-dopamine), promote sleep-wake cycles

## 2025-02-23 NOTE — SUBJECTIVE & OBJECTIVE
Interval History: patient is still confused DC Tegretol and ativan and narcotics and check Tegretol level.  Consulted ST and adjusted diet.  CHRISTO is improving on gentle IVF.  Urine culture growing. GNR,on Rocephin.    Review of Systems   Constitutional:  Positive for activity change and appetite change.   Neurological:  Positive for weakness.   Psychiatric/Behavioral:  Positive for confusion.      Objective:     Vital Signs (Most Recent):  Temp: 98.6 °F (37 °C) (02/23/25 0854)  Pulse: 84 (02/23/25 0854)  Resp: 20 (02/23/25 0431)  BP: (!) 157/84 (02/23/25 0854)  SpO2: (!) 94 % (02/23/25 0854) Vital Signs (24h Range):  Temp:  [98.5 °F (36.9 °C)-100.7 °F (38.2 °C)] 98.6 °F (37 °C)  Pulse:  [79-88] 84  Resp:  [16-35] 20  SpO2:  [93 %-97 %] 94 %  BP: (134-180)/(63-84) 157/84     Weight: 67.2 kg (148 lb 2.4 oz)  Body mass index is 28.93 kg/m².    Intake/Output Summary (Last 24 hours) at 2/23/2025 1136  Last data filed at 2/23/2025 0817  Gross per 24 hour   Intake 480 ml   Output 1 ml   Net 479 ml         Physical Exam  Constitutional:       Appearance: She is ill-appearing.   Cardiovascular:      Rate and Rhythm: Normal rate.   Pulmonary:      Effort: No respiratory distress.   Abdominal:      General: There is no distension.   Skin:     Coloration: Skin is not jaundiced.             Significant Labs: All pertinent labs within the past 24 hours have been reviewed.  BMP:   Recent Labs   Lab 02/23/25 0416   *      K 4.7   *   CO2 22*   BUN 32*   CREATININE 1.4   CALCIUM 8.7   MG 2.4     CBC:   Recent Labs   Lab 02/22/25 1837 02/23/25 0416   WBC 7.19 7.62   HGB 9.9* 8.0*   HCT 32.3* 26.5*    288     CMP:   Recent Labs   Lab 02/22/25 1837 02/23/25 0416    144   K 4.9 4.7   * 113*   CO2 21* 22*   * 130*   BUN 36* 32*   CREATININE 1.7* 1.4   CALCIUM 9.3 8.7   PROT 8.4  --    ALBUMIN 3.6  --    BILITOT 0.2  --    ALKPHOS 212*  --    AST 17  --    ALT 18  --    ANIONGAP 10 9        Significant Imaging: I have reviewed all pertinent imaging results/findings within the past 24 hours.

## 2025-02-23 NOTE — NURSING
Patient arrived in the unit via stretcher from ED  Awake and alert  No distress noted  On room air  On isolation prec, covid positive  With IV line in placed  On purewick  VS taken and recorded  Will continue to monitor    [facility]  Nurses Note -- 4 Eyes      2/23/2025       Skin assessed on: Admit      [x] No Pressure Injuries Present    [x]Prevention Measures Documented    [] Yes LDA  for Pressure Injury Previously documented     [] Yes New Pressure Injury Discovered   [] LDA for New Pressure Injury Added      Attending RN:  Machelle Ann, MAYELIN     Second RN:  Cynthia, SHEEBA

## 2025-02-23 NOTE — ED NOTES
IV Rocephin given to patient prior to blood culture collection. Dr. Campuzano informed, ok to proceed with blood culture collection post IV Abx administration

## 2025-02-23 NOTE — ASSESSMENT & PLAN NOTE
Patient has paroxysmal (<7 days) atrial fibrillation. Patient is currently in sinus rhythm. [id number] Score: 4. The patients heart rate in the last 24 hours is as follows:  Pulse  Min: 81  Max: 88     Antiarrhythmics   None    Anticoagulants  , 2 times daily with meals, Oral  rivaroxaban tablet 10 mg, 2 times daily with meals, Oral    Plan  - Replete lytes with a goal of K>4, Mg >2  - Patient is anticoagulated, see medications listed above.  - Patient's afib is currently controlled

## 2025-02-23 NOTE — PLAN OF CARE
Case Management Assessment     PCP: Dr. Frye  Pharmacy: [facility]  Patient Arrived From: [facility]  Existing Help at Home: Agency staff  Barriers to Discharge: n/a     Discharge Plan:  A: Return to Nursing Home  B: Return to Nursing Home    Discharge planning assessment completed with patient's daughter Jossie via phone. Patient resides at [facility] and will discharge back. Patient transports from bed to wheelchair.        02/23/25 3833   Discharge Assessment   Assessment Type Discharge Planning Assessment   Confirmed/corrected address, phone number and insurance Yes   Confirmed Demographics Correct on Facesheet   Source of Information family  (Jossie- daughter)   People in Home facility resident   Facility Arrived From: [facility]   Do you expect to return to your current living situation? Yes   Do you have help at home or someone to help you manage your care at home? Yes   Prior to hospitilization cognitive status: Unable to Assess   Current cognitive status: Unable to Assess   Walking or Climbing Stairs Difficulty yes   Dressing/Bathing Difficulty yes   Equipment Currently Used at Home wheelchair   Readmission within 30 days? Yes   Patient currently being followed by outpatient case management? No   Do you currently have service(s) that help you manage your care at home? No   Do you take prescription medications? Yes   Do you have prescription coverage? Yes   Coverage Medicare  Payor: MEDICARE - MEDICARE PART A & B -   Do you have any problems affording any of your prescribed medications? No   Is the patient taking medications as prescribed? yes   How do you get to doctors appointments? agency   Are you on dialysis? No   Do you take coumadin? No   Discharge Plan A Return to nursing home   Discharge Plan B   (return to nursing home)

## 2025-02-23 NOTE — ASSESSMENT & PLAN NOTE
Patient's blood pressure range in the last 24 hours was: BP  Min: 160/74  Max: 180/83.The patient's inpatient anti-hypertensive regimen is listed below:  Current Antihypertensives  carvediloL tablet 3.125 mg, 2 times daily with meals, Oral    Plan  - BP is uncontrolled, will adjust as follows: restart home meds as tolerated

## 2025-02-23 NOTE — ASSESSMENT & PLAN NOTE
Creatine stable for now. BMP reviewed- noted Estimated Creatinine Clearance: 17.8 mL/min (A) (based on SCr of 1.7 mg/dL (H)). according to latest data. 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  Monitor UOP and serial BMP and adjust therapy as needed. Renally dose meds. Avoid nephrotoxic medications and procedures.

## 2025-02-23 NOTE — ASSESSMENT & PLAN NOTE
patient is still confused DC Tegretol and ativan and narcotics and check Tegretol level.  Consulted ST and adjusted diet.

## 2025-02-23 NOTE — ASSESSMENT & PLAN NOTE
Patient with altered mental status and UA suggestive of infection. She is unable to communicate regarding symptoms therefore will treat with 3 days of ceftriaxone.  Given multiple prior Uas with similar findings patient likely has a colonization   Monitor fever curve and leucocytosis

## 2025-02-23 NOTE — SUBJECTIVE & OBJECTIVE
Past Medical History:   Diagnosis Date    Alzheimer disease     Breast cancer     CKD (chronic kidney disease)     Diabetes mellitus type II     Dyslipidemia     Dyslipidemia     Essential hypertension, benign     History of CVA (cerebrovascular accident)     Hypercholesteremia     Microalbuminuria     Mild anemia     Seizure disorder        History reviewed. No pertinent surgical history.    Review of patient's allergies indicates:  No Known Allergies    No current facility-administered medications on file prior to encounter.     Current Outpatient Medications on File Prior to Encounter   Medication Sig    carBAMazepine (TEGRETOL XR) 400 MG Tb12 Take 400 mg by mouth 2 (two) times daily.    carvediloL (COREG) 3.125 MG tablet Take 3.125 mg by mouth 2 (two) times daily with meals.    gabapentin (NEURONTIN) 300 MG capsule Take 300 mg by mouth 3 (three) times daily.    LORazepam (ATIVAN) 0.5 MG tablet Take 1 tablet by mouth 4 (four) times daily.    XARELTO 20 mg Tab Take 20 mg by mouth 2 (two) times daily with meals.    [DISCONTINUED] rivaroxaban (XARELTO) 10 mg Tab Take 2 tablets (20 mg total) by mouth daily with dinner or evening meal. Start date 1/21/2024     Family History       Problem Relation (Age of Onset)    Diabetes Mother          Tobacco Use    Smoking status: Never    Smokeless tobacco: Never   Substance and Sexual Activity    Alcohol use: No    Drug use: No    Sexual activity: Never     Review of Systems  Objective:     Vital Signs (Most Recent):  Temp: (!) 100.7 °F (38.2 °C) (02/22/25 2104)  Pulse: 84 (02/22/25 2300)  Resp: (!) 35 (02/22/25 2300)  BP: (!) 175/77 (02/22/25 2300)  SpO2: 95 % (02/22/25 2300) Vital Signs (24h Range):  Temp:  [98.5 °F (36.9 °C)-100.7 °F (38.2 °C)] 100.7 °F (38.2 °C)  Pulse:  [81-88] 84  Resp:  [16-35] 35  SpO2:  [93 %-97 %] 95 %  BP: (160-180)/(73-83) 175/77     Weight: 64.9 kg (143 lb)  Body mass index is 27.93 kg/m².     Physical Exam  Constitutional:       General: She is  not in acute distress.     Appearance: She is ill-appearing. She is not toxic-appearing or diaphoretic.   Eyes:      General: No scleral icterus.     Conjunctiva/sclera: Conjunctivae normal.   Cardiovascular:      Rate and Rhythm: Normal rate and regular rhythm.      Pulses: Normal pulses.      Heart sounds: Murmur heard.   Pulmonary:      Effort: Pulmonary effort is normal. No respiratory distress.      Breath sounds: No wheezing.   Abdominal:      General: There is no distension.      Tenderness: There is no abdominal tenderness.   Musculoskeletal:      Cervical back: Neck supple.      Comments: Curled up in bed with contractures   Neurological:      Mental Status: Mental status is at baseline. She is lethargic and disoriented.        Significant Labs: All pertinent labs within the past 24 hours have been reviewed.    Significant Imaging: I have reviewed all pertinent imaging results/findings within the past 24 hours.

## 2025-02-23 NOTE — PROGRESS NOTES
[facility] Medicine  Progress Note    Patient Name: Jossie Morejon  MRN: [id number]  Patient Class: IP- Inpatient   Admission Date: 2/22/2025  Length of Stay: 1 days  Attending Physician: Alondra Mckeon, *  Primary Care Provider: Imtiaz Frye MD        Subjective     Principal Problem:Urinary tract infection without hematuria        HPI:  Jossie Morejon is a 89 y.o. female who resides at [facility], seizure,Alzheimer disease, A.fib, Hx of breast cancer, CKD, Type 2 DM ( diet controlled), HLD, HTN, Hx of CVA, who resides in a nursing home since last 6 months presenting for evalution of lethargy.    Recent admission 12/30 for a UTI presents with altered mental status.  Daughter was concerned because she was more lethargic than usual.   Based on a note at  baseline she  he is oriented to self and will recognize some family members.  Here today she is awake but will not make eye contact, does appear lethargic.     In the ED patient noted to have     Initial Vitals [02/22/25 1802]  BP Pulse Resp Temp SpO2  (!) 167/73 81 16 98.5 °F (36.9 °C) (!) 94 %       Labs show - CHRISTO, Cr at baseline is 1.3 ( currently 1.7) elevated AlkP 212 eGFR of 28, BNP 69, Trops 0.016, stable chronic anemia, increased immature cells and neutrophils. UA notable for bacteria, >100 WBCs and clumps c/f UTI, urine cultures sent.   COVID positive. CT head negative.  In the ED, she later developed a fever of 100.7 while here. Given 2 g of Rocephin. Admit to Hospital medicine for UTI and COVID monitoring    Overview/Hospital Course:  Jossie Morejon is a 89 y.o. female who resides at [facility], seizure,Alzheimer disease, A.fib, Hx of breast cancer, CKD, Type 2 DM ( diet controlled), HLD, HTN, Hx of CVA, who resides in a nursing home since last 6 months presenting for evalution of lethargy.Labs show - CHRISTO, Cr at baseline is 1.3 ( currently 1.7) elevated AlkP 212 eGFR of 28, BNP 69, Trops 0.016, stable  chronic anemia, increased immature cells and neutrophils. UA notable for bacteria, >100 WBCs and clumps c/f UTI, urine cultures sent.   COVID positive. CT head negative.  In the ED, she later developed a fever of 100.7 while here. Given 2 g of Rocephin. Admit to Hospital medicine for UTI and COVID monitoring.  patient is still confused DC Tegretol and ativan and narcotics and check Tegretol level.  Consulted ST and adjusted diet.  CHRISTO is improving on gentle IVF.  Urine culture growing. GNR,on Rocephin.      Interval History: patient is still confused DC Tegretol and ativan and narcotics and check Tegretol level.  Consulted ST and adjusted diet.  CHRISTO is improving on gentle IVF.  Urine culture growing. GNR,on Rocephin.    Review of Systems   Constitutional:  Positive for activity change and appetite change.   Neurological:  Positive for weakness.   Psychiatric/Behavioral:  Positive for confusion.      Objective:     Vital Signs (Most Recent):  Temp: 98.6 °F (37 °C) (02/23/25 0854)  Pulse: 84 (02/23/25 0854)  Resp: 20 (02/23/25 0431)  BP: (!) 157/84 (02/23/25 0854)  SpO2: (!) 94 % (02/23/25 0854) Vital Signs (24h Range):  Temp:  [98.5 °F (36.9 °C)-100.7 °F (38.2 °C)] 98.6 °F (37 °C)  Pulse:  [79-88] 84  Resp:  [16-35] 20  SpO2:  [93 %-97 %] 94 %  BP: (134-180)/(63-84) 157/84     Weight: 67.2 kg (148 lb 2.4 oz)  Body mass index is 28.93 kg/m².    Intake/Output Summary (Last 24 hours) at 2/23/2025 1136  Last data filed at 2/23/2025 0817  Gross per 24 hour   Intake 480 ml   Output 1 ml   Net 479 ml         Physical Exam  Constitutional:       Appearance: She is ill-appearing.   Cardiovascular:      Rate and Rhythm: Normal rate.   Pulmonary:      Effort: No respiratory distress.   Abdominal:      General: There is no distension.   Skin:     Coloration: Skin is not jaundiced.             Significant Labs: All pertinent labs within the past 24 hours have been reviewed.  BMP:   Recent Labs   Lab 02/23/25  0416   *   NA  144   K 4.7   *   CO2 22*   BUN 32*   CREATININE 1.4   CALCIUM 8.7   MG 2.4     CBC:   Recent Labs   Lab 02/22/25 1837 02/23/25  0416   WBC 7.19 7.62   HGB 9.9* 8.0*   HCT 32.3* 26.5*    288     CMP:   Recent Labs   Lab 02/22/25  1837 02/23/25  0416    144   K 4.9 4.7   * 113*   CO2 21* 22*   * 130*   BUN 36* 32*   CREATININE 1.7* 1.4   CALCIUM 9.3 8.7   PROT 8.4  --    ALBUMIN 3.6  --    BILITOT 0.2  --    ALKPHOS 212*  --    AST 17  --    ALT 18  --    ANIONGAP 10 9       Significant Imaging: I have reviewed all pertinent imaging results/findings within the past 24 hours.    Assessment and Plan     * Urinary tract infection without hematuria  Patient with altered mental status and UA suggestive of infection. She is unable to communicate regarding symptoms therefore will treat with 3 days of ceftriaxone.  Given multiple prior Uas with similar findings patient likely has a colonization   Monitor fever curve and leucocytosis.      Urine culture growing. GNR,on Rocephin.    Acute renal failure superimposed on stage 3 chronic kidney disease  CHRISTO is likely due to pre-renal azotemia due to dehydration. Baseline creatinine is  1.4 . Most recent creatinine and eGFR are listed below.  Recent Labs     02/22/25 1837 02/23/25 0416   CREATININE 1.7* 1.4   EGFRNORACEVR 28* 35*      Plan  - CHRISTO is improving  - Avoid nephrotoxins and renally dose meds for GFR listed above  - Monitor urine output, serial BMP, and adjust therapy as needed  -     Acute metabolic encephalopathy    patient is still confused DC Tegretol and ativan and narcotics and check Tegretol level.  Consulted ST and adjusted diet.      A-fib  Patient has paroxysmal (<7 days) atrial fibrillation. Patient is currently in sinus rhythm. CNYPC2MJDh Score: 4. The patients heart rate in the last 24 hours is as follows:  Pulse  Min: 81  Max: 88     Antiarrhythmics   None    Anticoagulants  , 2 times daily with meals, Oral  rivaroxaban  tablet 10 mg, 2 times daily with meals, Oral    Plan  - Replete lytes with a goal of K>4, Mg >2  - Patient is anticoagulated, see medications listed above.  - Patient's afib is currently controlled        Disorientation  Patient has poor baseline mental status but reportedly off her usual self since this AM. Likely contributed metabolic derangements from UTI may be contributing  CT head with no acute structural changes  Plan:  - Treatment for UTI with ctx  - Additiona Delirium precautions: avoid offending meds (anticholinergic, antihistamine, benzo, anti-dopamine), promote sleep-wake cycles      COVID-19 virus detected  Patient noted to be positive for COVID but does not have any signs of respiratory distress. On room air with starts ~97-98%  - Will give remdesavir due to patient's age and risk of severe infection    Seizure disorder  No witnessed seizure,     DC Tegretol and ativan and narcotics and check Tegretol level.        HTN, goal below 140/90  Patient's blood pressure range in the last 24 hours was: BP  Min: 160/74  Max: 180/83.The patient's inpatient anti-hypertensive regimen is listed below:  Current Antihypertensives  carvediloL tablet 3.125 mg, 2 times daily with meals, Oral    Plan  - BP is uncontrolled, will adjust as follows: restart home meds as tolerated      Chronic kidney disease, stage III (moderate)  Creatine stable for now. BMP reviewed- noted Estimated Creatinine Clearance: 17.8 mL/min (A) (based on SCr of 1.7 mg/dL (H)). according to latest data. 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  Monitor UOP and serial BMP and adjust therapy as needed. Renally dose meds. Avoid nephrotoxic medications and procedures.    Diabetes mellitus with renal manifestations, controlled  Patient's FSGs are controlled on current medication regimen.  Last A1c reviewed-   Lab Results   Component Value Date    HGBA1C 5.7 (H) 12/29/2024   - Hold Oral hypoglycemics while patient is in the hospital.  -  Monitor BMP daily for glucose levels      VTE Risk Mitigation (From admission, onward)           Ordered     rivaroxaban tablet 10 mg  2 times daily with meals         02/22/25 2254     IP VTE HIGH RISK PATIENT  Once         02/22/25 2253     Place sequential compression device  Until discontinued         02/22/25 2253                    Discharge Planning   DEDE:      Code Status: Full Code   Medical Readiness for Discharge Date:                            Alondra Mckeon MD  Department of Hospital Medicine   [facility]

## 2025-02-23 NOTE — ASSESSMENT & PLAN NOTE
Patient's FSGs are controlled on current medication regimen.  Last A1c reviewed-   Lab Results   Component Value Date    HGBA1C 5.7 (H) 12/29/2024   - Hold Oral hypoglycemics while patient is in the hospital.  - Monitor BMP daily for glucose levels

## 2025-02-23 NOTE — ASSESSMENT & PLAN NOTE
CHRISTO is likely due to pre-renal azotemia due to dehydration. Baseline creatinine is  1.4 . Most recent creatinine and eGFR are listed below.  Recent Labs     02/22/25  1837 02/23/25  0416   CREATININE 1.7* 1.4   EGFRNORACEVR 28* 35*      Plan  - CHRISTO is improving  - Avoid nephrotoxins and renally dose meds for GFR listed above  - Monitor urine output, serial BMP, and adjust therapy as needed  -

## 2025-02-23 NOTE — ASSESSMENT & PLAN NOTE
Patient noted to be positive for COVID but does not have any signs of respiratory distress. On room air with starts ~97-98%  - Will give remdesavir due to patient's age and risk of severe infection

## 2025-02-23 NOTE — ASSESSMENT & PLAN NOTE
Patient with altered mental status and UA suggestive of infection. She is unable to communicate regarding symptoms therefore will treat with 3 days of ceftriaxone.  Given multiple prior Uas with similar findings patient likely has a colonization   Monitor fever curve and leucocytosis.      Urine culture growing. GNR,on Rocephin.

## 2025-02-23 NOTE — HPI
Jossie Morejon is a 89 y.o. female who resides at [facility], seizure,Alzheimer disease, A.fib, Hx of breast cancer, CKD, Type 2 DM ( diet controlled), HLD, HTN, Hx of CVA, who resides in a nursing home since last 6 months presenting for evalution of lethargy.    Recent admission 12/30 for a UTI presents with altered mental status.  Daughter was concerned because she was more lethargic than usual.   Based on a note at  baseline she  he is oriented to self and will recognize some family members.  Here today she is awake but will not make eye contact, does appear lethargic.     In the ED patient noted to have     Initial Vitals [02/22/25 1802]  BP Pulse Resp Temp SpO2  (!) 167/73 81 16 98.5 °F (36.9 °C) (!) 94 %       Labs show - CHRISTO, Cr at baseline is 1.3 ( currently 1.7) elevated AlkP 212 eGFR of 28, BNP 69, Trops 0.016, stable chronic anemia, increased immature cells and neutrophils. UA notable for bacteria, >100 WBCs and clumps c/f UTI, urine cultures sent.   COVID positive. CT head negative.  In the ED, she later developed a fever of 100.7 while here. Given 2 g of Rocephin. Admit to Hospital medicine for UTI and COVID monitoring

## 2025-02-23 NOTE — HOSPITAL COURSE
Jossie Morejon is a 89 y.o. female who resides at [facility], seizure,Alzheimer disease, A.fib, Hx of breast cancer, CKD, Type 2 DM ( diet controlled), HLD, HTN, Hx of CVA, who resides in a nursing home since last 6 months presenting for evalution of lethargy.Labs show - CHRISTO, Cr at baseline is 1.3 ( currently 1.7) elevated AlkP 212 eGFR of 28, BNP 69, Trops 0.016, stable chronic anemia, increased immature cells and neutrophils. UA notable for bacteria, >100 WBCs and clumps c/f UTI, urine cultures sent.   COVID positive. CT head negative.  In the ED, she later developed a fever of 100.7 while here. Given 2 g of Rocephin. Admit to Hospital medicine for UTI and COVID monitoring.  patient was still confused DC Tegretol and ativan and narcotics and check Tegretol level.resumed on lower Tegretol level.  Consulted ST and adjusted diet.  CHRISTO is resolved  on gentle IVF.  Urine culture grow Ecoli,on Rocephin.  Much more alert ,will do also head CT..show no acute process,mental status was back to baseline.  Consulted palliative.daughter and  interested in NH with Hospice.DNR status was placed.  With family agreement patient was discharged NH with Hospice.

## 2025-02-23 NOTE — ED PROVIDER NOTES
Encounter Date: 2/22/2025    SCRIBE #1 NOTE: I, Jacoby Escobar Do, am scribing for, and in the presence of,  Grupo Campuzano MD. I have scribed the following portions of the note - Other sections scribed: HPI, ROS, PE.       History     Chief Complaint   Patient presents with    Fatigue     Pt BIB EMS, due to family wanting pt evaluated. Per family, pt appears more lethargic than usual. Pt has hx of dementia. Pt to ED from [place]. Per nurse, this is pt's normal mentation but family requests EMS whenever they visit.      Jossie Morejon is a 89 y.o. female, with a pertinent PMHx of alzheimer's dementia, anemia, A-fib, type 2 diabetes, breast cancer, CVA without residual deficits, epilepsy, HTN, and CKD, who presents to the ED via EMS for general fatigue onset today. Per EMS, independent historian, they report the patient Is currently living at [facility]. EMS reports the patient's daughter visited the patient and noticed she appeared more lethargic. Nursing home staff reports the patient's daughter requests for EMS frequently during her previous visits. Patient is currently taking Carbamazepine, Coreg, and Xarelto. History was obtained by medical records at bedside. Medical records at bedside reports the patient is full code. Per chart review 12/28/2024, last previous CT head without any acute intracranial abnormalities identified.  No significant detrimental change compared to previous CT head from August 2023.    Jossie Ornelas (daughter): [phone]    The history is provided by medical records and the EMS personnel. No  was used.     Review of patient's allergies indicates:  No Known Allergies  Past Medical History:   Diagnosis Date    Alzheimer disease     Breast cancer     CKD (chronic kidney disease)     Diabetes mellitus type II     Dyslipidemia     Dyslipidemia     Essential hypertension, benign     History of CVA (cerebrovascular accident)     Hypercholesteremia      Microalbuminuria     Mild anemia     Seizure disorder      History reviewed. No pertinent surgical history.  Family History   Problem Relation Name Age of Onset    Diabetes Mother       Social History[1]  Review of Systems   Unable to perform ROS: Dementia       Physical Exam     Initial Vitals [02/22/25 1802]   BP Pulse Resp Temp SpO2   (!) 167/73 81 16 98.5 °F (36.9 °C) (!) 94 %      MAP       --         Physical Exam    Nursing note and vitals reviewed.  Constitutional: She appears well-developed and well-nourished. She is not diaphoretic.  Non-toxic appearance. She does not have a sickly appearance. She does not appear ill. No distress.   HENT:   Head: Normocephalic and atraumatic.   Right Ear: External ear normal.   Left Ear: External ear normal.   Nose: Nose normal. Mouth/Throat: Oropharynx is clear and moist. No oropharyngeal exudate.   Eyes: EOM are normal.   Neck: Neck supple.   Cardiovascular:  Normal rate, regular rhythm and normal heart sounds.           Pulmonary/Chest: Breath sounds normal. No respiratory distress. She has no wheezes. She has no rhonchi. She has no rales.   Abdominal: Abdomen is soft. Bowel sounds are normal. She exhibits no distension. There is no abdominal tenderness. There is no rebound and no guarding.   Musculoskeletal:         General: Normal range of motion.      Cervical back: Neck supple.      Comments: No lower extremity edema. Bilateral upper and lower extremities are warm and well perfused.      Neurological:   Patient is awake and alert, but not answering questions.    Skin: Skin is warm and dry. No rash noted.   Psychiatric: She has a normal mood and affect.         ED Course   Procedures  Labs Reviewed   CBC W/ AUTO DIFFERENTIAL - Abnormal       Result Value    WBC 7.19      RBC 3.35 (*)     Hemoglobin 9.9 (*)     Hematocrit 32.3 (*)     MCV 96      MCH 29.6      MCHC 30.7 (*)     RDW 15.0 (*)     Platelets 340      MPV 9.5      Immature Granulocytes 0.6 (*)     Gran #  (ANC) 5.7      Immature Grans (Abs) 0.04      Lymph # 0.6 (*)     Mono # 0.8      Eos # 0.1      Baso # 0.01      nRBC 0      Gran % 79.3 (*)     Lymph % 8.1 (*)     Mono % 11.1      Eosinophil % 0.8      Basophil % 0.1      Differential Method Automated     COMPREHENSIVE METABOLIC PANEL - Abnormal    Sodium 143      Potassium 4.9      Chloride 112 (*)     CO2 21 (*)     Glucose 126 (*)     BUN 36 (*)     Creatinine 1.7 (*)     Calcium 9.3      Total Protein 8.4      Albumin 3.6      Total Bilirubin 0.2      Alkaline Phosphatase 212 (*)     AST 17      ALT 18      eGFR 28 (*)     Anion Gap 10     URINALYSIS, REFLEX TO URINE CULTURE - Abnormal    Specimen UA Urine, Catheterized      Color, UA Yellow      Appearance, UA Hazy (*)     pH, UA 7.0      Specific Gravity, UA 1.015      Protein, UA Trace (*)     Glucose, UA Negative      Ketones, UA Negative      Bilirubin (UA) Negative      Occult Blood UA Trace (*)     Nitrite, UA Negative      Urobilinogen, UA Negative      Leukocytes, UA 3+ (*)     Narrative:     Specimen Source->Urine   URINALYSIS MICROSCOPIC - Abnormal    RBC, UA 5 (*)     WBC, UA >100 (*)     WBC Clumps, UA Few (*)     Bacteria Many (*)     Squam Epithel, UA 1      Non-Squam Epith 1 (*)     Microscopic Comment SEE COMMENT      Narrative:     Specimen Source->Urine   SARS-COV-2 RDRP GENE - Abnormal    POC Rapid COVID Positive (*)      Acceptable Yes     ISTAT PROCEDURE - Abnormal    POC PH 7.391      POC PCO2 40.9      POC PO2 19 (*)     POC HCO3 24.8      POC BE 0      POC SATURATED O2 28      POC TCO2 26      Sample VENOUS      Site Other      Allens Test N/A      DelSys Room Air     CULTURE, URINE   CULTURE, BLOOD   CULTURE, BLOOD   TROPONIN I    Troponin I 0.016     B-TYPE NATRIURETIC PEPTIDE    BNP 69     MAGNESIUM    Magnesium 2.5     LIPASE    Lipase 28     PHOSPHORUS   PHOSPHORUS    Phosphorus 3.1     POCT INFLUENZA A/B MOLECULAR    POC Molecular Influenza A Ag Negative      POC  Molecular Influenza B Ag Negative       Acceptable Yes     ISTAT LACTATE    POC Lactate 1.01      Sample VENOUS      Site Other      Allens Test N/A      DelSys Room Air            Imaging Results              X-Ray Chest 1 View (Final result)  Result time 02/22/25 20:33:45      Final result by Lori Pro MD (02/22/25 20:33:45)                   Impression:      No acute cardiopulmonary process identified.      Electronically signed by: Lori Pro MD  Date:    02/22/2025  Time:    20:33               Narrative:    EXAMINATION:  XR CHEST 1 VIEW    CLINICAL HISTORY:  Altered mental status, unspecified    TECHNIQUE:  Single frontal view of the chest was performed.    COMPARISON:  August 2023.    FINDINGS:  Cardiac silhouette is normal in size.  Lungs are hypoinflated.  No evidence of focal consolidative process, pneumothorax, or significant pleural effusion.  No acute osseous abnormality identified.  Lobular soft tissue lesion is seen at the lateral aspect of the right breast or chest wall.                                       CT Head Without Contrast (Final result)  Result time 02/22/25 19:18:01      Final result by Kitty Ovalles MD (02/22/25 19:18:01)                   Impression:      No acute intracranial abnormality detected.      Electronically signed by: Kitty Ovalles  Date:    02/22/2025  Time:    19:18               Narrative:    EXAMINATION:  CT OF THE HEAD WITHOUT    CLINICAL HISTORY:  Altered mental status, nontraumatic (Ped 0-18y);    TECHNIQUE:  5 mm unenhanced axial images were obtained from the skull base to the vertex.    COMPARISON:  12/28/2024    FINDINGS:  Moderate cerebral atrophy and chronic small vessel ischemic changes are present.  There is no acute intracranial hemorrhage, territorial infarct or mass effect, or midline shift.  There is remote right basal ganglia lacunar infarct.  The visualized paranasal sinuses and mastoid air cells are clear. There is  hyperostosis frontalis.  The sella is empty.                                       Medications   sodium chloride 0.9% flush 2 mL (has no administration in time range)   acetaminophen tablet 650 mg (has no administration in time range)   HYDROcodone-acetaminophen 5-325 mg per tablet 1 tablet (has no administration in time range)   HYDROcodone-acetaminophen  mg per tablet 1 tablet (has no administration in time range)   ondansetron injection 4 mg (has no administration in time range)   melatonin tablet 6 mg (has no administration in time range)   cefTRIAXone injection 1 g (has no administration in time range)   carBAMazepine 12 hr tablet 400 mg (has no administration in time range)   carvediloL tablet 3.125 mg (has no administration in time range)   gabapentin capsule 300 mg (has no administration in time range)   LORazepam tablet 0.5 mg (has no administration in time range)   rivaroxaban tablet 10 mg (has no administration in time range)   remdesivir 200 mg in 0.9% NaCl 250 mL infusion (200 mg Intravenous New Bag 2/23/25 0029)     Followed by   remdesivir 100 mg in 0.9% NaCl 250 mL infusion (has no administration in time range)   acetaminophen suppository 650 mg (650 mg Rectal Given 2/22/25 2045)   cefTRIAXone injection 2 g (2 g Intravenous Given 2/22/25 2217)     Medical Decision Making  Amount and/or Complexity of Data Reviewed  Independent Historian: EMS     Details: See HPI.  External Data Reviewed: notes.     Details: See HPI.  Labs: ordered. Decision-making details documented in ED Course.  Radiology: ordered. Decision-making details documented in ED Course.  ECG/medicine tests: ordered and independent interpretation performed. Decision-making details documented in ED Course.    Risk  OTC drugs.  Prescription drug management.  Decision regarding hospitalization.     Mild fever of 100.6;   Vitals otherwise unremarkable   Patient is more lethargic based on review of prior notes  EKG without STEMI  I doubt  acute stroke; had recent similar presentation and was diagnosed with a UTI  Creatinine 1.7, BUN 36,  glucose 126 assigned  troponin negative   BNP negative   Chest x-ray negative  COVID positive;  in no respiratory distress, no oxygen requirement  UA with evidence of infection   Patient covered with a Rocephin   CT head unremarkable   Patient admitted to hospital medicine for further management            Scribe Attestation:   Scribe #1: I performed the above scribed service and the documentation accurately describes the services I performed. I attest to the accuracy of the note.                               Clinical Impression:  Final diagnoses:  [R41.82] AMS (altered mental status)       I, Grupo Campuzano MD, personally performed the services described in this documentation. All medical record entries made by the scribe were at my direction and in my presence. I have reviewed the chart and agree that the record reflects my personal performance and is accurate and complete.      DISCLAIMER: This note was prepared with [employer] voice recognition transcription software. Garbled syntax, mangled pronouns, and other bizarre constructions may be attributed to that software system.     ED Disposition Condition    Admit                     [1]   Social History  Tobacco Use    Smoking status: Never    Smokeless tobacco: Never   Substance Use Topics    Alcohol use: No    Drug use: No        Grupo Campuzano MD  02/23/25 0041

## 2025-02-23 NOTE — H&P
[facility] Emergency Dept  [facility] Medicine  History & Physical    Patient Name: Jossie Morejon  MRN: [id number]  Patient Class: IP- Inpatient  Admission Date: 2/22/2025  Attending Physician: Alondra Mckeon, *   Primary Care Provider: Imtiaz Frye MD         Patient information was obtained from patient, past medical records, and ER records.     Subjective:     Principal Problem:Urinary tract infection without hematuria    Chief Complaint:   Chief Complaint   Patient presents with    Fatigue     Pt BIB EMS, due to family wanting pt evaluated. Per family, pt appears more lethargic than usual. Pt has hx of dementia. Pt to ED from [place]. Per nurse, this is pt's normal mentation but family requests EMS whenever they visit.         HPI: Jossie Morejon is a 89 y.o. female who resides at [place] nursing home, seizure,Alzheimer disease, A.fib, Hx of breast cancer, CKD, Type 2 DM ( diet controlled), HLD, HTN, Hx of CVA, who resides in a nursing home since last 6 months presenting for evalution of lethargy.    Recent admission 12/30 for a UTI presents with altered mental status.  Daughter was concerned because she was more lethargic than usual.   Based on a note at  baseline she  he is oriented to self and will recognize some family members.  Here today she is awake but will not make eye contact, does appear lethargic.     In the ED patient noted to have     Initial Vitals [02/22/25 1802]  BP Pulse Resp Temp SpO2  (!) 167/73 81 16 98.5 °F (36.9 °C) (!) 94 %       Labs show - CHRISTO, Cr at baseline is 1.3 ( currently 1.7) elevated AlkP 212 eGFR of 28, BNP 69, Trops 0.016, stable chronic anemia, increased immature cells and neutrophils. UA notable for bacteria, >100 WBCs and clumps c/f UTI, urine cultures sent.   COVID positive. CT head negative.  In the ED, she later developed a fever of 100.7 while here. Given 2 g of Rocephin. Admit to Hospital medicine for UTI and COVID monitoring    Past Medical History:    Diagnosis Date    Alzheimer disease     Breast cancer     CKD (chronic kidney disease)     Diabetes mellitus type II     Dyslipidemia     Dyslipidemia     Essential hypertension, benign     History of CVA (cerebrovascular accident)     Hypercholesteremia     Microalbuminuria     Mild anemia     Seizure disorder        History reviewed. No pertinent surgical history.    Review of patient's allergies indicates:  No Known Allergies    No current facility-administered medications on file prior to encounter.     Current Outpatient Medications on File Prior to Encounter   Medication Sig    carBAMazepine (TEGRETOL XR) 400 MG Tb12 Take 400 mg by mouth 2 (two) times daily.    carvediloL (COREG) 3.125 MG tablet Take 3.125 mg by mouth 2 (two) times daily with meals.    gabapentin (NEURONTIN) 300 MG capsule Take 300 mg by mouth 3 (three) times daily.    LORazepam (ATIVAN) 0.5 MG tablet Take 1 tablet by mouth 4 (four) times daily.    XARELTO 20 mg Tab Take 20 mg by mouth 2 (two) times daily with meals.    [DISCONTINUED] rivaroxaban (XARELTO) 10 mg Tab Take 2 tablets (20 mg total) by mouth daily with dinner or evening meal. Start date 1/21/2024     Family History       Problem Relation (Age of Onset)    Diabetes Mother          Tobacco Use    Smoking status: Never    Smokeless tobacco: Never   Substance and Sexual Activity    Alcohol use: No    Drug use: No    Sexual activity: Never     Review of Systems  Objective:     Vital Signs (Most Recent):  Temp: (!) 100.7 °F (38.2 °C) (02/22/25 2104)  Pulse: 84 (02/22/25 2300)  Resp: (!) 35 (02/22/25 2300)  BP: (!) 175/77 (02/22/25 2300)  SpO2: 95 % (02/22/25 2300) Vital Signs (24h Range):  Temp:  [98.5 °F (36.9 °C)-100.7 °F (38.2 °C)] 100.7 °F (38.2 °C)  Pulse:  [81-88] 84  Resp:  [16-35] 35  SpO2:  [93 %-97 %] 95 %  BP: (160-180)/(73-83) 175/77     Weight: 64.9 kg (143 lb)  Body mass index is 27.93 kg/m².     Physical Exam  Constitutional:       General: She is not in acute  distress.     Appearance: She is ill-appearing. She is not toxic-appearing or diaphoretic.   Eyes:      General: No scleral icterus.     Conjunctiva/sclera: Conjunctivae normal.   Cardiovascular:      Rate and Rhythm: Normal rate and regular rhythm.      Pulses: Normal pulses.      Heart sounds: Murmur heard.   Pulmonary:      Effort: Pulmonary effort is normal. No respiratory distress.      Breath sounds: No wheezing.   Abdominal:      General: There is no distension.      Tenderness: There is no abdominal tenderness.   Musculoskeletal:      Cervical back: Neck supple.      Comments: Curled up in bed with contractures   Neurological:      Mental Status: Mental status is at baseline. She is lethargic and disoriented.        Significant Labs: All pertinent labs within the past 24 hours have been reviewed.    Significant Imaging: I have reviewed all pertinent imaging results/findings within the past 24 hours.  Assessment/Plan:     * Urinary tract infection without hematuria  Patient with altered mental status and UA suggestive of infection. She is unable to communicate regarding symptoms therefore will treat with 3 days of ceftriaxone.  Given multiple prior Uas with similar findings patient likely has a colonization   Monitor fever curve and leucocytosis      Lethargy and altered mental status  Patient has poor baseline mental status but reportedly off her usual self since this AM. Likely contributed metabolic derangements from UTI may be contributing  CT head with no acute structural changes  Plan:  - Treatment for UTI with ctx  - Additiona Delirium precautions: avoid offending meds (anticholinergic, antihistamine, benzo, anti-dopamine), promote sleep-wake cycles      COVID-19 virus detected  Patient noted to be positive for COVID but does not have any signs of respiratory distress. On room air with starts ~97-98%  - Will give remdesavir due to patient's age and risk of severe infection    HTN, goal below  140/90  Patient's blood pressure range in the last 24 hours was: BP  Min: 160/74  Max: 180/83.The patient's inpatient anti-hypertensive regimen is listed below:  Current Antihypertensives  carvediloL tablet 3.125 mg, 2 times daily with meals, Oral    Plan  - BP is uncontrolled, will adjust as follows: restart home meds as tolerated    A.fib  Patient has paroxysmal (<7 days) atrial fibrillation. Patient is currently in sinus rhythm. [id number] Score: 4. The patients heart rate in the last 24 hours is as follows:  Pulse  Min: 81  Max: 88   Antiarrhythmics   None   Anticoagulants  , 2 times daily with meals, Oral  rivaroxaban tablet 10 mg, 2 times daily with meals, Oral   Plan  - Replete lytes with a goal of K>4, Mg >2  - Patient is anticoagulated, see medications listed above.  - Patient's afib is currently controlled        CHRISTO on CKD  Chronic kidney disease, stage III (moderate)  Creatine stable for now. BMP reviewed- noted Estimated Creatinine Clearance: 17.8 mL/min (A) (based on SCr of 1.7 mg/dL (H)). according to latest data. 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  Monitor UOP and serial BMP and adjust therapy as needed. Renally dose meds. Avoid nephrotoxic medications and procedures.    CHRISTO is likely due to pre-renal azotemia due to dehydration. Baseline creatinine is  1.4 . Most recent creatinine and eGFR are listed below.  Recent Labs     02/22/25  1837   CREATININE 1.7*   EGFRNORACEVR 28*      Plan  - Avoid nephrotoxins and renally dose meds for GFR listed above  - Monitor urine output, serial BMP, and adjust therapy as needed  - Hydration as tolerated    Diabetes mellitus with renal manifestations, controlled  Patient's FSGs are controlled on current medication regimen.  Last A1c reviewed-   Lab Results   Component Value Date    HGBA1C 5.7 (H) 12/29/2024   - Hold Oral hypoglycemics while patient is in the hospital.  - Monitor BMP daily for glucose levels      VTE Risk Mitigation (From  admission, onward)           Ordered     rivaroxaban tablet 10 mg  2 times daily with meals         02/22/25 2254     IP VTE HIGH RISK PATIENT  Once         02/22/25 2253     Place sequential compression device  Until discontinued         02/22/25 2253                                    Elizabeth Carrillo MD  Department of Hospital Medicine  [facility] - [place] - Emergency Dept

## 2025-02-24 PROBLEM — Z71.89 ACP (ADVANCE CARE PLANNING): Status: ACTIVE | Noted: 2025-02-24

## 2025-02-24 LAB
ANION GAP SERPL CALC-SCNC: 11 MMOL/L (ref 8–16)
BACTERIA UR CULT: ABNORMAL
BASOPHILS # BLD AUTO: 0.01 K/UL (ref 0–0.2)
BASOPHILS NFR BLD: 0.2 % (ref 0–1.9)
BUN SERPL-MCNC: 30 MG/DL (ref 10–30)
CALCIUM SERPL-MCNC: 8.1 MG/DL (ref 8.7–10.5)
CHLORIDE SERPL-SCNC: 108 MMOL/L (ref 95–110)
CO2 SERPL-SCNC: 19 MMOL/L (ref 23–29)
CREAT SERPL-MCNC: 1.5 MG/DL (ref 0.5–1.4)
DIFFERENTIAL METHOD BLD: ABNORMAL
EOSINOPHIL # BLD AUTO: 0 K/UL (ref 0–0.5)
EOSINOPHIL NFR BLD: 0.7 % (ref 0–8)
ERYTHROCYTE [DISTWIDTH] IN BLOOD BY AUTOMATED COUNT: 14.9 % (ref 11.5–14.5)
EST. GFR  (NO RACE VARIABLE): 32 ML/MIN/1.73 M^2
GLUCOSE SERPL-MCNC: 130 MG/DL (ref 70–110)
HCT VFR BLD AUTO: 26.2 % (ref 37–48.5)
HGB BLD-MCNC: 7.8 G/DL (ref 12–16)
IMM GRANULOCYTES # BLD AUTO: 0.03 K/UL (ref 0–0.04)
IMM GRANULOCYTES NFR BLD AUTO: 0.5 % (ref 0–0.5)
LYMPHOCYTES # BLD AUTO: 1.3 K/UL (ref 1–4.8)
LYMPHOCYTES NFR BLD: 23.4 % (ref 18–48)
MAGNESIUM SERPL-MCNC: 2.2 MG/DL (ref 1.6–2.6)
MCH RBC QN AUTO: 28.6 PG (ref 27–31)
MCHC RBC AUTO-ENTMCNC: 29.8 G/DL (ref 32–36)
MCV RBC AUTO: 96 FL (ref 82–98)
MONOCYTES # BLD AUTO: 1.2 K/UL (ref 0.3–1)
MONOCYTES NFR BLD: 20.8 % (ref 4–15)
NEUTROPHILS # BLD AUTO: 3.1 K/UL (ref 1.8–7.7)
NEUTROPHILS NFR BLD: 54.4 % (ref 38–73)
NRBC BLD-RTO: 0 /100 WBC
PLATELET # BLD AUTO: 239 K/UL (ref 150–450)
PMV BLD AUTO: 9.8 FL (ref 9.2–12.9)
POTASSIUM SERPL-SCNC: 4.3 MMOL/L (ref 3.5–5.1)
RBC # BLD AUTO: 2.73 M/UL (ref 4–5.4)
SODIUM SERPL-SCNC: 138 MMOL/L (ref 136–145)
WBC # BLD AUTO: 5.73 K/UL (ref 3.9–12.7)

## 2025-02-24 PROCEDURE — 27000207 HC ISOLATION

## 2025-02-24 PROCEDURE — 83735 ASSAY OF MAGNESIUM: CPT | Performed by: STUDENT IN AN ORGANIZED HEALTH CARE EDUCATION/TRAINING PROGRAM

## 2025-02-24 PROCEDURE — 63600175 PHARM REV CODE 636 W HCPCS: Performed by: HOSPITALIST

## 2025-02-24 PROCEDURE — 80048 BASIC METABOLIC PNL TOTAL CA: CPT | Performed by: STUDENT IN AN ORGANIZED HEALTH CARE EDUCATION/TRAINING PROGRAM

## 2025-02-24 PROCEDURE — 25000003 PHARM REV CODE 250: Performed by: STUDENT IN AN ORGANIZED HEALTH CARE EDUCATION/TRAINING PROGRAM

## 2025-02-24 PROCEDURE — 25000003 PHARM REV CODE 250: Performed by: HOSPITALIST

## 2025-02-24 PROCEDURE — 92610 EVALUATE SWALLOWING FUNCTION: CPT

## 2025-02-24 PROCEDURE — 97535 SELF CARE MNGMENT TRAINING: CPT

## 2025-02-24 PROCEDURE — 11000001 HC ACUTE MED/SURG PRIVATE ROOM

## 2025-02-24 PROCEDURE — 99222 1ST HOSP IP/OBS MODERATE 55: CPT | Mod: ,,, | Performed by: REGISTERED NURSE

## 2025-02-24 PROCEDURE — 85025 COMPLETE CBC W/AUTO DIFF WBC: CPT | Performed by: STUDENT IN AN ORGANIZED HEALTH CARE EDUCATION/TRAINING PROGRAM

## 2025-02-24 PROCEDURE — 36415 COLL VENOUS BLD VENIPUNCTURE: CPT | Performed by: STUDENT IN AN ORGANIZED HEALTH CARE EDUCATION/TRAINING PROGRAM

## 2025-02-24 PROCEDURE — 63600175 PHARM REV CODE 636 W HCPCS: Mod: JZ,TB | Performed by: STUDENT IN AN ORGANIZED HEALTH CARE EDUCATION/TRAINING PROGRAM

## 2025-02-24 RX ORDER — CARBAMAZEPINE 100 MG/1
300 TABLET, CHEWABLE ORAL 2 TIMES DAILY
Status: DISCONTINUED | OUTPATIENT
Start: 2025-02-24 | End: 2025-02-27 | Stop reason: HOSPADM

## 2025-02-24 RX ORDER — MUPIROCIN 20 MG/G
OINTMENT TOPICAL 2 TIMES DAILY
Status: DISCONTINUED | OUTPATIENT
Start: 2025-02-24 | End: 2025-02-27 | Stop reason: HOSPADM

## 2025-02-24 RX ADMIN — CARBAMAZEPINE 300 MG: 100 TABLET, CHEWABLE ORAL at 10:02

## 2025-02-24 RX ADMIN — GABAPENTIN 300 MG: 300 CAPSULE ORAL at 08:02

## 2025-02-24 RX ADMIN — RIVAROXABAN 10 MG: 10 TABLET, FILM COATED ORAL at 08:02

## 2025-02-24 RX ADMIN — ACETAMINOPHEN 650 MG: 325 TABLET ORAL at 04:02

## 2025-02-24 RX ADMIN — GABAPENTIN 300 MG: 300 CAPSULE ORAL at 02:02

## 2025-02-24 RX ADMIN — CARVEDILOL 3.12 MG: 3.12 TABLET, FILM COATED ORAL at 04:02

## 2025-02-24 RX ADMIN — MUPIROCIN: 20 OINTMENT TOPICAL at 10:02

## 2025-02-24 RX ADMIN — CARVEDILOL 3.12 MG: 3.12 TABLET, FILM COATED ORAL at 08:02

## 2025-02-24 RX ADMIN — GABAPENTIN 300 MG: 300 CAPSULE ORAL at 10:02

## 2025-02-24 RX ADMIN — RIVAROXABAN 10 MG: 10 TABLET, FILM COATED ORAL at 04:02

## 2025-02-24 RX ADMIN — CEFTRIAXONE 2 G: 2 INJECTION, POWDER, FOR SOLUTION INTRAMUSCULAR; INTRAVENOUS at 10:02

## 2025-02-24 RX ADMIN — REMDESIVIR 100 MG: 100 INJECTION, POWDER, LYOPHILIZED, FOR SOLUTION INTRAVENOUS at 08:02

## 2025-02-24 NOTE — PT/OT/SLP PROGRESS
Speech Language Pathology      Jossie Morejon  MRN: [id number]    Patient not seen 2/23 secondary to Other (Comment) Patient ANN. Will follow-up 2/24.

## 2025-02-24 NOTE — ASSESSMENT & PLAN NOTE
Patient has poor baseline mental status but reportedly off her usual self since this AM. Likely contributed metabolic derangements from UTI may be contributing  CT head with no acute structural changes  Plan:  - Treatment for UTI with ctx  - Additiona Delirium precautions: avoid offending meds (anticholinergic, antihistamine, benzo, anti-dopamine), promote sleep-wake cycles.  Improving.

## 2025-02-24 NOTE — PLAN OF CARE
Problem: SLP  Goal: SLP Goal  Description: ST. Pt will tolerate PO diet of pureed consistency with thin liquids without overt s/s of aspiration.    2. Pt will tolerate PO trials of soft solids with adequate mastication and no overt s/s of aspiration.            Outcome: Progressing    B/s swallow eval completed. ST recs pureed diet and thin liquids. Crushed meds. ST following.

## 2025-02-24 NOTE — PLAN OF CARE
Problem: Infection  Goal: Absence of Infection Signs and Symptoms  Outcome: Progressing     Problem: Adult Inpatient Plan of Care  Goal: Plan of Care Review  Outcome: Progressing  Goal: Patient-Specific Goal (Individualized)  Outcome: Progressing  Goal: Absence of Hospital-Acquired Illness or Injury  Outcome: Progressing  Goal: Optimal Comfort and Wellbeing  Outcome: Progressing  Goal: Readiness for Transition of Care  Outcome: Progressing     Problem: Diabetes Comorbidity  Goal: Blood Glucose Level Within Targeted Range  Outcome: Progressing     Problem: Fall Injury Risk  Goal: Absence of Fall and Fall-Related Injury  Outcome: Progressing     Problem: Skin Injury Risk Increased  Goal: Skin Health and Integrity  Outcome: Progressing     Problem: Acute Kidney Injury/Impairment  Goal: Fluid and Electrolyte Balance  Outcome: Progressing  Goal: Improved Oral Intake  Outcome: Progressing  Goal: Effective Renal Function  Outcome: Progressing

## 2025-02-24 NOTE — ASSESSMENT & PLAN NOTE
patient is still confused DC Tegretol and ativan and narcotics and check Tegretol level.resumed lower diose.  Consulted ST and adjusted diet.  Much more alert today.

## 2025-02-24 NOTE — ASSESSMENT & PLAN NOTE
No witnessed seizure,     DC Tegretol and ativan and narcotics and check Tegretol level.  Resumed on lower dose.

## 2025-02-24 NOTE — SUBJECTIVE & OBJECTIVE
Interval History: patient is still confused DC Tegretol and ativan and narcotics and check Tegretol level.resumed on lower dose.  Consulted ST and adjusted diet.  Urine culture grow Ecoli,on Rocephin.  Much more alert today.  Consulted palliative    Review of Systems   Constitutional:  Positive for activity change and appetite change.   Neurological:  Positive for weakness.   Psychiatric/Behavioral:  Positive for confusion.      Objective:     Vital Signs (Most Recent):  Temp: 99.4 °F (37.4 °C) (02/24/25 0715)  Pulse: 89 (02/24/25 0715)  Resp: 18 (02/24/25 0715)  BP: 129/63 (02/24/25 0715)  SpO2: 96 % (02/24/25 0715) Vital Signs (24h Range):  Temp:  [98 °F (36.7 °C)-101.4 °F (38.6 °C)] 99.4 °F (37.4 °C)  Pulse:  [79-91] 89  Resp:  [18-21] 18  SpO2:  [93 %-96 %] 96 %  BP: (106-164)/(53-77) 129/63     Weight: 67.2 kg (148 lb 2.4 oz)  Body mass index is 28.93 kg/m².    Intake/Output Summary (Last 24 hours) at 2/24/2025 0948  Last data filed at 2/24/2025 0837  Gross per 24 hour   Intake 2091.53 ml   Output 450 ml   Net 1641.53 ml         Physical Exam  Constitutional:       Appearance: She is ill-appearing.   Cardiovascular:      Rate and Rhythm: Normal rate.   Pulmonary:      Effort: No respiratory distress.   Abdominal:      General: There is no distension.   Skin:     Coloration: Skin is not jaundiced.             Significant Labs: All pertinent labs within the past 24 hours have been reviewed.  BMP:   Recent Labs   Lab 02/24/25  0509   *      K 4.3      CO2 19*   BUN 30   CREATININE 1.5*   CALCIUM 8.1*   MG 2.2     CBC:   Recent Labs   Lab 02/22/25  1837 02/23/25  0416 02/24/25  0509   WBC 7.19 7.62 5.73   HGB 9.9* 8.0* 7.8*   HCT 32.3* 26.5* 26.2*    288 239     CMP:   Recent Labs   Lab 02/22/25  1837 02/23/25  0416 02/24/25  0509    144 138   K 4.9 4.7 4.3   * 113* 108   CO2 21* 22* 19*   * 130* 130*   BUN 36* 32* 30   CREATININE 1.7* 1.4 1.5*   CALCIUM 9.3 8.7 8.1*    PROT 8.4  --   --    ALBUMIN 3.6  --   --    BILITOT 0.2  --   --    ALKPHOS 212*  --   --    AST 17  --   --    ALT 18  --   --    ANIONGAP 10 9 11       Significant Imaging: I have reviewed all pertinent imaging results/findings within the past 24 hours.

## 2025-02-24 NOTE — NURSING
[facility] - [place]  Nurses Note -- 4 Eyes      2/23/2025       Skin assessed on: Q Shift      [x] No Pressure Injuries Present    [x]Prevention Measures Documented    [] Yes LDA  for Pressure Injury Previously documented     [] Yes New Pressure Injury Discovered   [] LDA for New Pressure Injury Added      Attending RN:  aMchelle Ann RN     Second RN:  MAYELIN Meredith

## 2025-02-24 NOTE — PLAN OF CARE
SONJA called [facility] with [facility] and informed her pt tested positive for covid on 2/22, but pt asymptomatic per Dr. Nichole. She stated she's about to go into a morning meeting and will let her administration know and they will determine when it will be safe for pt to return.

## 2025-02-24 NOTE — PROGRESS NOTES
[facility] Medicine  Progress Note    Patient Name: Jossie Morejon  MRN: [id number]  Patient Class: IP- Inpatient   Admission Date: 2/22/2025  Length of Stay: 2 days  Attending Physician: Alondra Mckeon, *  Primary Care Provider: Imtaiz Frye MD        Subjective     Principal Problem:Urinary tract infection without hematuria        HPI:  Jossie Morejon is a 89 y.o. female who resides at [facility], seizure,Alzheimer disease, A.fib, Hx of breast cancer, CKD, Type 2 DM ( diet controlled), HLD, HTN, Hx of CVA, who resides in a nursing home since last 6 months presenting for evalution of lethargy.    Recent admission 12/30 for a UTI presents with altered mental status.  Daughter was concerned because she was more lethargic than usual.   Based on a note at  baseline she  he is oriented to self and will recognize some family members.  Here today she is awake but will not make eye contact, does appear lethargic.     In the ED patient noted to have     Initial Vitals [02/22/25 1802]  BP Pulse Resp Temp SpO2  (!) 167/73 81 16 98.5 °F (36.9 °C) (!) 94 %       Labs show - CHRISTO, Cr at baseline is 1.3 ( currently 1.7) elevated AlkP 212 eGFR of 28, BNP 69, Trops 0.016, stable chronic anemia, increased immature cells and neutrophils. UA notable for bacteria, >100 WBCs and clumps c/f UTI, urine cultures sent.   COVID positive. CT head negative.  In the ED, she later developed a fever of 100.7 while here. Given 2 g of Rocephin. Admit to Hospital medicine for UTI and COVID monitoring    Overview/Hospital Course:  Jossie Morejon is a 89 y.o. female who resides at [facility], seizure,Alzheimer disease, A.fib, Hx of breast cancer, CKD, Type 2 DM ( diet controlled), HLD, HTN, Hx of CVA, who resides in a nursing home since last 6 months presenting for evalution of lethargy.Labs show - CHRISTO, Cr at baseline is 1.3 ( currently 1.7) elevated AlkP 212 eGFR of 28, BNP 69, Trops 0.016, stable  chronic anemia, increased immature cells and neutrophils. UA notable for bacteria, >100 WBCs and clumps c/f UTI, urine cultures sent.   COVID positive. CT head negative.  In the ED, she later developed a fever of 100.7 while here. Given 2 g of Rocephin. Admit to Hospital medicine for UTI and COVID monitoring.  patient is still confused DC Tegretol and ativan and narcotics and check Tegretol level.resumed on lower Tegretol level.  Consulted ST and adjusted diet.  CHRISTO is improving on gentle IVF.  Urine culture grow Ecoli,on Rocephin.  Much more alert today.  Consulted palliative      Interval History: patient is still confused DC Tegretol and ativan and narcotics and check Tegretol level.resumed on lower dose.  Consulted ST and adjusted diet.  Urine culture grow Ecoli,on Rocephin.  Much more alert today.  Consulted palliative    Review of Systems   Constitutional:  Positive for activity change and appetite change.   Neurological:  Positive for weakness.   Psychiatric/Behavioral:  Positive for confusion.      Objective:     Vital Signs (Most Recent):  Temp: 99.4 °F (37.4 °C) (02/24/25 0715)  Pulse: 89 (02/24/25 0715)  Resp: 18 (02/24/25 0715)  BP: 129/63 (02/24/25 0715)  SpO2: 96 % (02/24/25 0715) Vital Signs (24h Range):  Temp:  [98 °F (36.7 °C)-101.4 °F (38.6 °C)] 99.4 °F (37.4 °C)  Pulse:  [79-91] 89  Resp:  [18-21] 18  SpO2:  [93 %-96 %] 96 %  BP: (106-164)/(53-77) 129/63     Weight: 67.2 kg (148 lb 2.4 oz)  Body mass index is 28.93 kg/m².    Intake/Output Summary (Last 24 hours) at 2/24/2025 0967  Last data filed at 2/24/2025 0837  Gross per 24 hour   Intake 2091.53 ml   Output 450 ml   Net 1641.53 ml         Physical Exam  Constitutional:       Appearance: She is ill-appearing.   Cardiovascular:      Rate and Rhythm: Normal rate.   Pulmonary:      Effort: No respiratory distress.   Abdominal:      General: There is no distension.   Skin:     Coloration: Skin is not jaundiced.             Significant Labs: All  pertinent labs within the past 24 hours have been reviewed.  BMP:   Recent Labs   Lab 02/24/25  0509   *      K 4.3      CO2 19*   BUN 30   CREATININE 1.5*   CALCIUM 8.1*   MG 2.2     CBC:   Recent Labs   Lab 02/22/25  1837 02/23/25  0416 02/24/25  0509   WBC 7.19 7.62 5.73   HGB 9.9* 8.0* 7.8*   HCT 32.3* 26.5* 26.2*    288 239     CMP:   Recent Labs   Lab 02/22/25  1837 02/23/25  0416 02/24/25  0509    144 138   K 4.9 4.7 4.3   * 113* 108   CO2 21* 22* 19*   * 130* 130*   BUN 36* 32* 30   CREATININE 1.7* 1.4 1.5*   CALCIUM 9.3 8.7 8.1*   PROT 8.4  --   --    ALBUMIN 3.6  --   --    BILITOT 0.2  --   --    ALKPHOS 212*  --   --    AST 17  --   --    ALT 18  --   --    ANIONGAP 10 9 11       Significant Imaging: I have reviewed all pertinent imaging results/findings within the past 24 hours.    Assessment and Plan     * Urinary tract infection without hematuria  Patient with altered mental status and UA suggestive of infection. She is unable to communicate regarding symptoms therefore will treat with 3 days of ceftriaxone.  Given multiple prior Uas with similar findings patient likely has a colonization   Monitor fever curve and leucocytosis.      Urine culture growing. GNR,on Rocephin.    Acute renal failure superimposed on stage 3 chronic kidney disease  CHRISTO is likely due to pre-renal azotemia due to dehydration. Baseline creatinine is  1.4 . Most recent creatinine and eGFR are listed below.  Recent Labs     02/22/25 1837 02/23/25  0416   CREATININE 1.7* 1.4   EGFRNORACEVR 28* 35*      Plan  - CHRISTO is improving  - Avoid nephrotoxins and renally dose meds for GFR listed above  - Monitor urine output, serial BMP, and adjust therapy as needed  -     Acute metabolic encephalopathy    patient is still confused DC Tegretol and ativan and narcotics and check Tegretol level.resumed lower diose.  Consulted ST and adjusted diet.  Much more alert today.      A-fib  Patient has  paroxysmal (<7 days) atrial fibrillation. Patient is currently in sinus rhythm. [id number] Score: 4. The patients heart rate in the last 24 hours is as follows:  Pulse  Min: 81  Max: 88     Antiarrhythmics   None    Anticoagulants  , 2 times daily with meals, Oral  rivaroxaban tablet 10 mg, 2 times daily with meals, Oral    Plan  - Replete lytes with a goal of K>4, Mg >2  - Patient is anticoagulated, see medications listed above.  - Patient's afib is currently controlled        Disorientation  Patient has poor baseline mental status but reportedly off her usual self since this AM. Likely contributed metabolic derangements from UTI may be contributing  CT head with no acute structural changes  Plan:  - Treatment for UTI with ctx  - Additiona Delirium precautions: avoid offending meds (anticholinergic, antihistamine, benzo, anti-dopamine), promote sleep-wake cycles.  Improving.      COVID-19 virus detected  Patient noted to be positive for COVID but does not have any signs of respiratory distress. On room air with starts ~97-98%  - Will give remdesavir due to patient's age and risk of severe infection    Seizure disorder  No witnessed seizure,     DC Tegretol and ativan and narcotics and check Tegretol level.  Resumed on lower dose.        HTN, goal below 140/90  Patient's blood pressure range in the last 24 hours was: BP  Min: 160/74  Max: 180/83.The patient's inpatient anti-hypertensive regimen is listed below:  Current Antihypertensives  carvediloL tablet 3.125 mg, 2 times daily with meals, Oral    Plan  - BP is uncontrolled, will adjust as follows: restart home meds as tolerated      Chronic kidney disease, stage III (moderate)  Creatine stable for now. BMP reviewed- noted Estimated Creatinine Clearance: 17.8 mL/min (A) (based on SCr of 1.7 mg/dL (H)). according to latest data. 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  Monitor UOP and serial BMP and adjust therapy as needed. Renally dose meds. Avoid  nephrotoxic medications and procedures.    Diabetes mellitus with renal manifestations, controlled  Patient's FSGs are controlled on current medication regimen.  Last A1c reviewed-   Lab Results   Component Value Date    HGBA1C 5.7 (H) 12/29/2024   - Hold Oral hypoglycemics while patient is in the hospital.  - Monitor BMP daily for glucose levels      VTE Risk Mitigation (From admission, onward)           Ordered     rivaroxaban tablet 10 mg  2 times daily with meals         02/22/25 2254     IP VTE HIGH RISK PATIENT  Once         02/22/25 2253     Place sequential compression device  Until discontinued         02/22/25 2253                    Discharge Planning   DEDE:      Code Status: Full Code   Medical Readiness for Discharge Date:   Discharge Plan A: Return to nursing home                        Alondra Mckeon MD  Department of Hospital Medicine   [facility] S[facility]

## 2025-02-24 NOTE — PT/OT/SLP EVAL
"Speech Language Pathology Evaluation  Bedside Swallow    Patient Name:  Jossie Morejon   MRN:  [id number]  Admitting Diagnosis: Urinary tract infection without hematuria    Recommendations:                 General Recommendations:  Dysphagia therapy  Diet recommendations:  Puree Diet - IDDSI Level 4, Thin liquids - IDDSI Level 0   Aspiration Precautions: 1 bite/sip at a time, Alternating bites/sips, Assistance with meals, HOB to 90 degrees, Meds crushed in puree, and Small bites/sips   General Precautions: Standard, airborne, droplet, pureed diet  Communication strategies:  provide increased time to answer    Assessment:     Jossie Morejon is a 89 y.o. female with an dx of Urinary tract infection without hematuria. Pt presents with oropharyngeal dysphagia of unknown severity negatively impacted by impaired cognition below baseline. Rec: con't pureed/thin liquid diet. ST will follow pt to ensure diet tolerance ad advance diet when safe and appropriate.    History:     Past Medical History:   Diagnosis Date    Alzheimer disease     Breast cancer     CKD (chronic kidney disease)     Diabetes mellitus type II     Dyslipidemia     Dyslipidemia     Essential hypertension, benign     History of CVA (cerebrovascular accident)     Hypercholesteremia     Microalbuminuria     Mild anemia     Seizure disorder        History reviewed. No pertinent surgical history.    Social History: Patient lives at [facility]..    Modified Barium Swallow: none per emr  Cardiac silhouette is normal in size.  Lungs are hypoinflated.  No evidence of focal consolidative process, pneumothorax, or significant pleural effusion.  No acute osseous abnormality identified.  Lobular soft tissue lesion is seen at the lateral aspect of the right breast or chest wall.     Chest X-Rays: 2/22/25:     Prior diet: unrestricted at NH per family. "Pt eats everything"      Subjective     Pt alert, awake,and oriented to self only upon SLP arrival. Pt's son and " granddaughter present for swallow eval. Granddaughter stated pt ate everything at NH without restriction or difficulty prior to admit. Pt also eats at baseline without dentures. Family also reported pt had memory problems PTA but could communicate in a functional manner stating pt is below baseline in communication.    MAYELIN Baldwin reported pt had difficulty swallowing one of her larger pills this am.      Pain/Comfort:  Pain Rating 1: 0/10    Respiratory Status: Room air    Objective:     Oral Musculature Evaluation  Oral Musculature: unable to assess due to poor participation/comprehension  Dentition: edentulous  Secretion Management: adequate  Mucosal Quality: good  Lingual Strength and Mobility: other (see comments) (impaired coordination)  Voice Prior to PO Intake: slightly wet/gurgly  Oral Musculature Comments: unable to formally assess 2/2 pt confusion; impaired lingual coordination note during masticationof soft solid trial    Bedside Swallow Eval:   Consistencies Assessed:  Thin liquids via spoons x1; straw x5 trials ~4 oz  Puree x4 trials  Soft solids x2 trials      Oral Phase:         Difficulty siphoning liquids through straw initially   Tongue pumping  Decreased closure around utensil  Prolonged mastication  Slow oral transit time  Spitting out of food/liquid- soft solids    Pharyngeal Phase:   delayed swallow initation  multiple spontaneous swallows    Compensatory Strategies  None    Treatment: Rec: Pt begin PO diet of pureed consistency with thin liquids.    Please note silent aspiration cannot be ruled out at bedside.    Education: Patient and family educated on aspiration precautions..( small bites/sips, alternate liquids with solids, crush meds)  MAYELIN Barrett notified regarding diet recs. White board update in patient's room regarding diet recs.    Handouts attached to echart regarding diet modifications.      Goals:   Multidisciplinary Problems       SLP Goals          Problem: SLP    Goal Priority  Disciplines Outcome   SLP Goal     SLP Progressing   Description: ST. Pt will tolerate PO diet of pureed consistency with thin liquids without overt s/s of aspiration.    2. Pt will tolerate PO trials of soft solids with adequate mastication and no overt s/s of aspiration.                                 Plan:     Patient to be seen:  3 x/week   Plan of Care expires:  25  Plan of Care reviewed with:  patient, son, grandchild(matilde)   SLP Follow-Up:  Yes       Discharge recommendations:  TBD    Barriers to Discharge:  None    Time Tracking:     SLP Treatment Date:      Speech Start Time:    Speech Stop Time:  1039     Speech Total Time (min):  25 min    Billable Minutes: Eval Swallow and Oral Function 16 min and Self Care/Home Management Training 9 min    2025

## 2025-02-24 NOTE — NURSING
[facility] [place] 4 [place]      2/24/2025       Skin assessed on: Q Shift      [x] No Pressure Injuries Present    [x]Prevention Measures Documented    [] Yes LDA  for Pressure Injury Previously documented     [] Yes New Pressure Injury Discovered   [] LDA for New Pressure Injury Added      Attending RN:  Viri Landa, RN     Second RN:  Machelle Ann

## 2025-02-25 LAB
ANION GAP SERPL CALC-SCNC: 10 MMOL/L (ref 8–16)
BASOPHILS # BLD AUTO: 0.02 K/UL (ref 0–0.2)
BASOPHILS NFR BLD: 0.4 % (ref 0–1.9)
BUN SERPL-MCNC: 34 MG/DL (ref 10–30)
CALCIUM SERPL-MCNC: 7.9 MG/DL (ref 8.7–10.5)
CHLORIDE SERPL-SCNC: 102 MMOL/L (ref 95–110)
CO2 SERPL-SCNC: 19 MMOL/L (ref 23–29)
CREAT SERPL-MCNC: 1.4 MG/DL (ref 0.5–1.4)
DIFFERENTIAL METHOD BLD: ABNORMAL
EOSINOPHIL # BLD AUTO: 0.1 K/UL (ref 0–0.5)
EOSINOPHIL NFR BLD: 2.6 % (ref 0–8)
ERYTHROCYTE [DISTWIDTH] IN BLOOD BY AUTOMATED COUNT: 14.6 % (ref 11.5–14.5)
EST. GFR  (NO RACE VARIABLE): 35 ML/MIN/1.73 M^2
GLUCOSE SERPL-MCNC: 120 MG/DL (ref 70–110)
HCT VFR BLD AUTO: 29.4 % (ref 37–48.5)
HGB BLD-MCNC: 9 G/DL (ref 12–16)
IMM GRANULOCYTES # BLD AUTO: 0.04 K/UL (ref 0–0.04)
IMM GRANULOCYTES NFR BLD AUTO: 0.8 % (ref 0–0.5)
LYMPHOCYTES # BLD AUTO: 1.7 K/UL (ref 1–4.8)
LYMPHOCYTES NFR BLD: 31.4 % (ref 18–48)
MAGNESIUM SERPL-MCNC: 2 MG/DL (ref 1.6–2.6)
MCH RBC QN AUTO: 28.8 PG (ref 27–31)
MCHC RBC AUTO-ENTMCNC: 30.6 G/DL (ref 32–36)
MCV RBC AUTO: 94 FL (ref 82–98)
MONOCYTES # BLD AUTO: 1.1 K/UL (ref 0.3–1)
MONOCYTES NFR BLD: 19.9 % (ref 4–15)
NEUTROPHILS # BLD AUTO: 2.4 K/UL (ref 1.8–7.7)
NEUTROPHILS NFR BLD: 44.9 % (ref 38–73)
NRBC BLD-RTO: 0 /100 WBC
PLATELET # BLD AUTO: 255 K/UL (ref 150–450)
PMV BLD AUTO: 10.1 FL (ref 9.2–12.9)
POTASSIUM SERPL-SCNC: 4.5 MMOL/L (ref 3.5–5.1)
RBC # BLD AUTO: 3.12 M/UL (ref 4–5.4)
SODIUM SERPL-SCNC: 131 MMOL/L (ref 136–145)
WBC # BLD AUTO: 5.32 K/UL (ref 3.9–12.7)

## 2025-02-25 PROCEDURE — 36415 COLL VENOUS BLD VENIPUNCTURE: CPT | Performed by: STUDENT IN AN ORGANIZED HEALTH CARE EDUCATION/TRAINING PROGRAM

## 2025-02-25 PROCEDURE — 85025 COMPLETE CBC W/AUTO DIFF WBC: CPT | Performed by: STUDENT IN AN ORGANIZED HEALTH CARE EDUCATION/TRAINING PROGRAM

## 2025-02-25 PROCEDURE — 99498 ADVNCD CARE PLAN ADDL 30 MIN: CPT | Mod: ,,, | Performed by: REGISTERED NURSE

## 2025-02-25 PROCEDURE — 80048 BASIC METABOLIC PNL TOTAL CA: CPT | Performed by: STUDENT IN AN ORGANIZED HEALTH CARE EDUCATION/TRAINING PROGRAM

## 2025-02-25 PROCEDURE — 25000003 PHARM REV CODE 250: Performed by: HOSPITALIST

## 2025-02-25 PROCEDURE — 27000207 HC ISOLATION

## 2025-02-25 PROCEDURE — 99233 SBSQ HOSP IP/OBS HIGH 50: CPT | Mod: ,,, | Performed by: REGISTERED NURSE

## 2025-02-25 PROCEDURE — 83735 ASSAY OF MAGNESIUM: CPT | Performed by: STUDENT IN AN ORGANIZED HEALTH CARE EDUCATION/TRAINING PROGRAM

## 2025-02-25 PROCEDURE — 99497 ADVNCD CARE PLAN 30 MIN: CPT | Mod: ,,, | Performed by: REGISTERED NURSE

## 2025-02-25 PROCEDURE — 63600175 PHARM REV CODE 636 W HCPCS: Performed by: HOSPITALIST

## 2025-02-25 PROCEDURE — 94761 N-INVAS EAR/PLS OXIMETRY MLT: CPT

## 2025-02-25 PROCEDURE — 25000003 PHARM REV CODE 250: Performed by: STUDENT IN AN ORGANIZED HEALTH CARE EDUCATION/TRAINING PROGRAM

## 2025-02-25 PROCEDURE — 11000001 HC ACUTE MED/SURG PRIVATE ROOM

## 2025-02-25 RX ORDER — AMLODIPINE BESYLATE 5 MG/1
5 TABLET ORAL DAILY
Status: DISCONTINUED | OUTPATIENT
Start: 2025-02-25 | End: 2025-02-26

## 2025-02-25 RX ADMIN — MUPIROCIN: 20 OINTMENT TOPICAL at 08:02

## 2025-02-25 RX ADMIN — GABAPENTIN 300 MG: 300 CAPSULE ORAL at 08:02

## 2025-02-25 RX ADMIN — MUPIROCIN: 20 OINTMENT TOPICAL at 09:02

## 2025-02-25 RX ADMIN — AMLODIPINE BESYLATE 5 MG: 5 TABLET ORAL at 12:02

## 2025-02-25 RX ADMIN — GABAPENTIN 300 MG: 300 CAPSULE ORAL at 02:02

## 2025-02-25 RX ADMIN — CARBAMAZEPINE 300 MG: 100 TABLET, CHEWABLE ORAL at 09:02

## 2025-02-25 RX ADMIN — CEFTRIAXONE 2 G: 2 INJECTION, POWDER, FOR SOLUTION INTRAMUSCULAR; INTRAVENOUS at 10:02

## 2025-02-25 RX ADMIN — GABAPENTIN 300 MG: 300 CAPSULE ORAL at 09:02

## 2025-02-25 RX ADMIN — RIVAROXABAN 10 MG: 10 TABLET, FILM COATED ORAL at 05:02

## 2025-02-25 RX ADMIN — CARBAMAZEPINE 300 MG: 100 TABLET, CHEWABLE ORAL at 08:02

## 2025-02-25 RX ADMIN — CARVEDILOL 3.12 MG: 3.12 TABLET, FILM COATED ORAL at 08:02

## 2025-02-25 RX ADMIN — RIVAROXABAN 10 MG: 10 TABLET, FILM COATED ORAL at 08:02

## 2025-02-25 NOTE — ASSESSMENT & PLAN NOTE
- chronic condition noted; during 2023 admission palliative assessment pt was FAST scale 7A and PPS 40%  - will discuss current baseline symptoms more with daughter; likely remains hospice appropriate; prior hospice discussions but unclear if pt was ever enrolled in hospice

## 2025-02-25 NOTE — ASSESSMENT & PLAN NOTE
2025  - interval chart reviewed  - able to arouse pt easier today from nap, still not able to appropriately answer questions and lethargic so allowed to rest   - call placed to daughterJossie for GOC/ACP discussion   - learned more about pt outside of hospitalization; pt has been a resident of [facility] since 2024, daughter shared concerns regarding pt's decline and overall care at NH and that she has explored other options, but unfortunately feel issues she has would be faced at alternate facilities as well so she has been communicating concerns to NH   - pt has 6 living children (1 ); Jossie shares that she has been pt's primary care giver and typically arranges pt's care; Jossie is agreeable to updated pt's children and shares that they all usually agree with pt's care decisions; all 6 children collectively share legal surrogate decision making   - pt previously lived with Jossie and did very well (even improved to no longer qualify for hospice in the past under her care), but due to work demands and not being able to afford private sitters NH became necessary   - pt was previously under hospice care with [facility]; reviewed pt's current qualification for hospice, which daughter shared relief to hear as they have been hoping to include [facility] into pt's care at NH; daughter agreeable to meeting with [facility] and for pt eval   - reviewed current full code status and alternative/recommendation for DNR; reviewed potential interventions, outcomes, and risks; Jossie (daughter) agrees that DNR would be best for pt at this time, but wishes to discuss with pt's other children before formally changing code status   - discussed plan for hospice evaluation and for follow up call to confirm wishes for code status   - emotional support provided; answered all questions   - updated MH and CM     Consult - 25  - consult received; interval chart reviewed in  detail  - visited pt at bedside; attempted to arouse but sleeping, minimally aroused and allowed to rest   - no family at bedside at time of visit; later revisited to assess for family again   - call placed to number on file; will continue attempts to meet with/contact family for GOC/ACP discussion

## 2025-02-25 NOTE — SUBJECTIVE & OBJECTIVE
Interval History: patient is still confused DC Tegretol and ativan and narcotics and check Tegretol level.resumed on lower dose.  Consulted ST and adjusted diet.  Urine culture grow Ecoli,on Rocephin.  Much more alert today.  Consulted palliative    Review of Systems   Constitutional:  Positive for activity change and appetite change.   Neurological:  Positive for weakness.   Psychiatric/Behavioral:  Positive for confusion.      Objective:     Vital Signs (Most Recent):  Temp: 98.3 °F (36.8 °C) (02/25/25 0748)  Pulse: 72 (02/25/25 0844)  Resp: 16 (02/25/25 0748)  BP: (!) 150/53 (02/25/25 0844)  SpO2: 96 % (02/25/25 0748) Vital Signs (24h Range):  Temp:  [98.3 °F (36.8 °C)-100.9 °F (38.3 °C)] 98.3 °F (36.8 °C)  Pulse:  [63-72] 72  Resp:  [16-20] 16  SpO2:  [95 %-100 %] 96 %  BP: (120-162)/(53-82) 150/53     Weight: 67.2 kg (148 lb 2.4 oz)  Body mass index is 28.93 kg/m².    Intake/Output Summary (Last 24 hours) at 2/25/2025 1135  Last data filed at 2/25/2025 0636  Gross per 24 hour   Intake 1475.79 ml   Output 1900 ml   Net -424.21 ml         Physical Exam  Constitutional:       Appearance: She is ill-appearing.   Cardiovascular:      Rate and Rhythm: Normal rate.   Pulmonary:      Effort: No respiratory distress.   Abdominal:      General: There is no distension.   Skin:     Coloration: Skin is not jaundiced.             Significant Labs: All pertinent labs within the past 24 hours have been reviewed.  BMP:   Recent Labs   Lab 02/25/25  0508   *   *   K 4.5      CO2 19*   BUN 34*   CREATININE 1.4   CALCIUM 7.9*   MG 2.0     CBC:   Recent Labs   Lab 02/24/25  0509 02/25/25  0508   WBC 5.73 5.32   HGB 7.8* 9.0*   HCT 26.2* 29.4*    255     CMP:   Recent Labs   Lab 02/24/25  0509 02/25/25  0508    131*   K 4.3 4.5    102   CO2 19* 19*   * 120*   BUN 30 34*   CREATININE 1.5* 1.4   CALCIUM 8.1* 7.9*   ANIONGAP 11 10       Significant Imaging: I have reviewed all pertinent  imaging results/findings within the past 24 hours.

## 2025-02-25 NOTE — PLAN OF CARE
Problem: Infection  Goal: Absence of Infection Signs and Symptoms  Outcome: Progressing     Problem: Adult Inpatient Plan of Care  Goal: Plan of Care Review  Outcome: Progressing  Goal: Patient-Specific Goal (Individualized)  Outcome: Progressing  Goal: Absence of Hospital-Acquired Illness or Injury  Outcome: Progressing  Goal: Optimal Comfort and Wellbeing  Outcome: Progressing  Goal: Readiness for Transition of Care  Outcome: Progressing     Problem: Diabetes Comorbidity  Goal: Blood Glucose Level Within Targeted Range  Outcome: Progressing     Problem: Fall Injury Risk  Goal: Absence of Fall and Fall-Related Injury  Outcome: Progressing     Problem: Skin Injury Risk Increased  Goal: Skin Health and Integrity  Outcome: Progressing     Problem: Acute Kidney Injury/Impairment  Goal: Fluid and Electrolyte Balance  Outcome: Progressing  Goal: Improved Oral Intake  Outcome: Progressing  Goal: Effective Renal Function  Outcome: Progressing     Problem: Coping Ineffective  Goal: Effective Coping  Outcome: Progressing

## 2025-02-25 NOTE — ASSESSMENT & PLAN NOTE
- at most recent baseline pt is wheelchair and bed bound; pt able to assist some with transfers but has been experiencing ongoing deconditioning since being at the NH since July 2024   - pt is incontinent to bowel and bladder and requires assistance with ADLs   - PPS 30% and FAST Scale 7C; hospice appropriate

## 2025-02-25 NOTE — PROGRESS NOTES
[facility] Medicine  Progress Note    Patient Name: Jossie Morejon  MRN: [id number]  Patient Class: IP- Inpatient   Admission Date: 2/22/2025  Length of Stay: 3 days  Attending Physician: Alondra Mckeon, *  Primary Care Provider: Imtiaz Frye MD        Subjective     Principal Problem:Urinary tract infection without hematuria        HPI:  Jossie Morejon is a 89 y.o. female who resides at [facility], seizure,Alzheimer disease, A.fib, Hx of breast cancer, CKD, Type 2 DM ( diet controlled), HLD, HTN, Hx of CVA, who resides in a nursing home since last 6 months presenting for evalution of lethargy.    Recent admission 12/30 for a UTI presents with altered mental status.  Daughter was concerned because she was more lethargic than usual.   Based on a note at  baseline she  he is oriented to self and will recognize some family members.  Here today she is awake but will not make eye contact, does appear lethargic.     In the ED patient noted to have     Initial Vitals [02/22/25 1802]  BP Pulse Resp Temp SpO2  (!) 167/73 81 16 98.5 °F (36.9 °C) (!) 94 %       Labs show - CHRISTO, Cr at baseline is 1.3 ( currently 1.7) elevated AlkP 212 eGFR of 28, BNP 69, Trops 0.016, stable chronic anemia, increased immature cells and neutrophils. UA notable for bacteria, >100 WBCs and clumps c/f UTI, urine cultures sent.   COVID positive. CT head negative.  In the ED, she later developed a fever of 100.7 while here. Given 2 g of Rocephin. Admit to Hospital medicine for UTI and COVID monitoring    Overview/Hospital Course:  Jossie Morejon is a 89 y.o. female who resides at [facility], seizure,Alzheimer disease, A.fib, Hx of breast cancer, CKD, Type 2 DM ( diet controlled), HLD, HTN, Hx of CVA, who resides in a nursing home since last 6 months presenting for evalution of lethargy.Labs show - CHRISTO, Cr at baseline is 1.3 ( currently 1.7) elevated AlkP 212 eGFR of 28, BNP 69, Trops 0.016, stable  chronic anemia, increased immature cells and neutrophils. UA notable for bacteria, >100 WBCs and clumps c/f UTI, urine cultures sent.   COVID positive. CT head negative.  In the ED, she later developed a fever of 100.7 while here. Given 2 g of Rocephin. Admit to Hospital medicine for UTI and COVID monitoring.  patient is still confused DC Tegretol and ativan and narcotics and check Tegretol level.resumed on lower Tegretol level.  Consulted ST and adjusted diet.  CHRISTO is improving on gentle IVF.  Urine culture grow Ecoli,on Rocephin.  Much more alert today.  Consulted palliative.      Interval History: patient is still confused DC Tegretol and ativan and narcotics and check Tegretol level.resumed on lower dose.  Consulted ST and adjusted diet.  Urine culture grow Ecoli,on Rocephin.  Much more alert today.  Consulted palliative    Review of Systems   Constitutional:  Positive for activity change and appetite change.   Neurological:  Positive for weakness.   Psychiatric/Behavioral:  Positive for confusion.      Objective:     Vital Signs (Most Recent):  Temp: 98.3 °F (36.8 °C) (02/25/25 0748)  Pulse: 72 (02/25/25 0844)  Resp: 16 (02/25/25 0748)  BP: (!) 150/53 (02/25/25 0844)  SpO2: 96 % (02/25/25 0748) Vital Signs (24h Range):  Temp:  [98.3 °F (36.8 °C)-100.9 °F (38.3 °C)] 98.3 °F (36.8 °C)  Pulse:  [63-72] 72  Resp:  [16-20] 16  SpO2:  [95 %-100 %] 96 %  BP: (120-162)/(53-82) 150/53     Weight: 67.2 kg (148 lb 2.4 oz)  Body mass index is 28.93 kg/m².    Intake/Output Summary (Last 24 hours) at 2/25/2025 1135  Last data filed at 2/25/2025 0636  Gross per 24 hour   Intake 1475.79 ml   Output 1900 ml   Net -424.21 ml         Physical Exam  Constitutional:       Appearance: She is ill-appearing.   Cardiovascular:      Rate and Rhythm: Normal rate.   Pulmonary:      Effort: No respiratory distress.   Abdominal:      General: There is no distension.   Skin:     Coloration: Skin is not jaundiced.             Significant  Labs: All pertinent labs within the past 24 hours have been reviewed.  BMP:   Recent Labs   Lab 02/25/25  0508   *   *   K 4.5      CO2 19*   BUN 34*   CREATININE 1.4   CALCIUM 7.9*   MG 2.0     CBC:   Recent Labs   Lab 02/24/25  0509 02/25/25  0508   WBC 5.73 5.32   HGB 7.8* 9.0*   HCT 26.2* 29.4*    255     CMP:   Recent Labs   Lab 02/24/25  0509 02/25/25  0508    131*   K 4.3 4.5    102   CO2 19* 19*   * 120*   BUN 30 34*   CREATININE 1.5* 1.4   CALCIUM 8.1* 7.9*   ANIONGAP 11 10       Significant Imaging: I have reviewed all pertinent imaging results/findings within the past 24 hours.    Assessment and Plan     * Urinary tract infection without hematuria  Patient with altered mental status and UA suggestive of infection. She is unable to communicate regarding symptoms therefore will treat with 3 days of ceftriaxone.  Given multiple prior Uas with similar findings patient likely has a colonization   Monitor fever curve and leucocytosis.      Urine culture growing. GNR,on Rocephin.    Acute renal failure superimposed on stage 3 chronic kidney disease  CHRISTO is likely due to pre-renal azotemia due to dehydration. Baseline creatinine is  1.4 . Most recent creatinine and eGFR are listed below.  Recent Labs     02/22/25  1837 02/23/25  0416   CREATININE 1.7* 1.4   EGFRNORACEVR 28* 35*      Plan  - CHRISTO is improving  - Avoid nephrotoxins and renally dose meds for GFR listed above  - Monitor urine output, serial BMP, and adjust therapy as needed  -     Acute metabolic encephalopathy    patient is still confused DC Tegretol and ativan and narcotics and check Tegretol level.resumed lower diose.  Consulted ST and adjusted diet.  Much more alert today.      A-fib  Patient has paroxysmal (<7 days) atrial fibrillation. Patient is currently in sinus rhythm. [id number] Score: 4. The patients heart rate in the last 24 hours is as follows:  Pulse  Min: 81  Max: 88     Antiarrhythmics    None    Anticoagulants  , 2 times daily with meals, Oral  rivaroxaban tablet 10 mg, 2 times daily with meals, Oral    Plan  - Replete lytes with a goal of K>4, Mg >2  - Patient is anticoagulated, see medications listed above.  - Patient's afib is currently controlled        Disorientation  Patient has poor baseline mental status but reportedly off her usual self since this AM. Likely contributed metabolic derangements from UTI may be contributing  CT head with no acute structural changes  Plan:  - Treatment for UTI with ctx  - Additiona Delirium precautions: avoid offending meds (anticholinergic, antihistamine, benzo, anti-dopamine), promote sleep-wake cycles.  Improving.      COVID-19 virus detected  Patient noted to be positive for COVID but does not have any signs of respiratory distress. On room air with starts ~97-98%  - Will give remdesavir due to patient's age and risk of severe infection    Seizure disorder  No witnessed seizure,     DC Tegretol and ativan and narcotics and check Tegretol level.  Resumed on lower dose.        HTN, goal below 140/90  Patient's blood pressure range in the last 24 hours was: BP  Min: 160/74  Max: 180/83.The patient's inpatient anti-hypertensive regimen is listed below:  Current Antihypertensives  carvediloL tablet 3.125 mg, 2 times daily with meals, Oral    Plan  - BP is uncontrolled, will adjust as follows: restart home meds as tolerated      Chronic kidney disease, stage III (moderate)  Creatine stable for now. BMP reviewed- noted Estimated Creatinine Clearance: 17.8 mL/min (A) (based on SCr of 1.7 mg/dL (H)). according to latest data. 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  Monitor UOP and serial BMP and adjust therapy as needed. Renally dose meds. Avoid nephrotoxic medications and procedures.    Diabetes mellitus with renal manifestations, controlled  Patient's FSGs are controlled on current medication regimen.  Last A1c reviewed-   Lab Results   Component  Value Date    HGBA1C 5.7 (H) 12/29/2024   - Hold Oral hypoglycemics while patient is in the hospital.  - Monitor BMP daily for glucose levels      VTE Risk Mitigation (From admission, onward)           Ordered     rivaroxaban tablet 10 mg  2 times daily with meals         02/22/25 2254     IP VTE HIGH RISK PATIENT  Once         02/22/25 2253     Place sequential compression device  Until discontinued         02/22/25 2253                    Discharge Planning   DEDE: 2/28/2025     Code Status: Full Code   Medical Readiness for Discharge Date:   Discharge Plan A: Return to nursing home                        Alondra Mckeon MD  Department of Hospital Medicine   [facility] - Med Surg

## 2025-02-25 NOTE — ASSESSMENT & PLAN NOTE
- noted; likely contributing to AMS beyond baseline dementia   - daughter reports frequent UTI's related to incontinence and shares her concerns for lack of appropriate perineal care at NH   - frequent UTI's contribute to appropriateness for hospice

## 2025-02-25 NOTE — PROGRESS NOTES
[facility]  Palliative Medicine  Progress Note    Patient Name: Jossie Morejon  MRN: [id number]  Admission Date: 2025  Hospital Length of Stay: 3 days  Code Status: Full Code   Attending Provider: Alondra Mckeon, *  Consulting Provider: Carolyn Griggs NP  Primary Care Physician: Imtiaz Frye MD  Principal Problem:Urinary tract infection without hematuria    Patient information was obtained from relative(s) and primary team.      Assessment/Plan:   Palliative Care  Advance Care Planning   2025  - interval chart reviewed  - able to arouse pt easier today from nap, still not able to appropriately answer questions and lethargic so allowed to rest   - call placed to daughterJossie for GOC/ACP discussion   - learned more about pt outside of hospitalization; pt has been a resident of [facility] since 2024, daughter shared concerns regarding pt's decline and overall care at NH and that she has explored other options, but unfortunately feel issues she has would be faced at alternate facilities as well so she has been communicating concerns to NH   - pt has 6 living children (1 ); Jossie shares that she has been pt's primary care giver and typically arranges pt's care; Jossie is agreeable to updated pt's children and shares that they all usually agree with pt's care decisions; all 6 children collectively share legal surrogate decision making   - pt previously lived with Jossie and did very well (even improved to no longer qualify for hospice in the past under her care), but due to work demands and not being able to afford private sitters NH became necessary   - pt was previously under hospice care with [facility]; reviewed pt's current qualification for hospice, which daughter shared relief to hear as they have been hoping to include [facility] into pt's care at NH; daughter agreeable to meeting with [facility] and for pt eval   - reviewed current  full code status and alternative/recommendation for DNR; reviewed potential interventions, outcomes, and risks; Jossie (daughter) agrees that DNR would be best for pt at this time, but wishes to discuss with pt's other children before formally changing code status   - discussed plan for hospice evaluation and for follow up call to confirm wishes for code status   - emotional support provided; answered all questions   - updated MH and CM     Consult - 2/24/25  - consult received; interval chart reviewed in detail  - visited pt at bedside; attempted to arouse but sleeping, minimally aroused and allowed to rest   - no family at bedside at time of visit; later revisited to assess for family again   - call placed to number on file; will continue attempts to meet with/contact family for GOC/ACP discussion     Neuro  Acute metabolic encephalopathy  - daughter shares concerns for pt's medication regimen at NH  - pt's dementia can occasionally include behavioral disturbances which has lead to what daughter feels like could be over medicating and sedating   - discussed role of hospice in symptom and medication management     Seizure disorder  - chronic condition noted     Late onset Alzheimer's disease without behavioral disturbance  - at most recent baseline pt is wheelchair and bed bound; pt able to assist some with transfers but has been experiencing ongoing deconditioning since being at the NH since July 2024   - pt is incontinent to bowel and bladder and requires assistance with ADLs   - PPS 30% and FAST Scale 7C; hospice appropriate       Renal/  * Urinary tract infection without hematuria  - noted; likely contributing to AMS beyond baseline dementia   - daughter reports frequent UTI's related to incontinence and shares her concerns for lack of appropriate perineal care at NH   - frequent UTI's contribute to appropriateness for hospice     ID  COVID-19 virus detected  - incidental finding on admission; CM/SW in  "communication with NH to assess affect on return to NH timing     I will follow-up with patient. Please contact us if you have any additional questions.    Subjective:     Chief Complaint:   Chief Complaint   Patient presents with    Fatigue     Pt BIB EMS, due to family wanting pt evaluated. Per family, pt appears more lethargic than usual. Pt has hx of dementia. Pt to ED from [place]. Per nurse, this is pt's normal mentation but family requests EMS whenever they visit.        HPI:   From H&P: "rene Morejon is a 89 y.o. female who resides at [facility], seizure,Alzheimer disease, A.fib, Hx of breast cancer, CKD, Type 2 DM ( diet controlled), HLD, HTN, Hx of CVA, who resides in a nursing home since last 6 months presenting for evalution of lethargy.     Recent admission 12/30 for a UTI presents with altered mental status.  Daughter was concerned because she was more lethargic than usual.   Based on a note at  baseline she  he is oriented to self and will recognize some family members.  Here today she is awake but will not make eye contact, does appear lethargic. "    Palliative medicine consulted for goals of care discussion and advance care planning; for details of visit, see advance care planning section of plan.       Hospital Course:  No notes on file    Medications:  Continuous Infusions:  Scheduled Meds:   amLODIPine  5 mg Oral Daily    carBAMazepine  300 mg Oral BID    carvediloL  3.125 mg Oral BID WM    cefTRIAXone (Rocephin) IV (PEDS and ADULTS)  2 g Intravenous Q24H    gabapentin  300 mg Oral TID    mupirocin   Nasal BID    rivaroxaban  10 mg Oral BID WM     PRN Meds:  Current Facility-Administered Medications:     acetaminophen, 650 mg, Oral, Q4H PRN    melatonin, 6 mg, Oral, Nightly PRN    ondansetron, 4 mg, Intravenous, Q8H PRN    sodium chloride 0.9%, 2 mL, Intravenous, Q6H PRN    Objective:     Vital Signs (Most Recent):  Temp: 98.4 °F (36.9 °C) (02/25/25 1243)  Pulse: 67 (02/25/25 " 1243)  Resp: (!) 22 (02/25/25 1243)  BP: 138/80 (02/25/25 1243)  SpO2: 98 % (02/25/25 1243) Vital Signs (24h Range):  Temp:  [98.3 °F (36.8 °C)-100.9 °F (38.3 °C)] 98.4 °F (36.9 °C)  Pulse:  [63-72] 67  Resp:  [16-22] 22  SpO2:  [95 %-100 %] 98 %  BP: (120-162)/(53-82) 138/80     Weight: 67.2 kg (148 lb 2.4 oz)  Body mass index is 28.93 kg/m².       Physical Exam  Vitals and nursing note reviewed.   Constitutional:       Appearance: She is ill-appearing.      Comments: Elderly, frail; napping but able to arouse easier    HENT:      Head: Normocephalic and atraumatic.   Pulmonary:      Effort: Pulmonary effort is normal. No respiratory distress.   Neurological:      Mental Status: She is easily aroused. She is disoriented.       Advance Care Planning   Advance Directives:   Living Will: No    LaPOST: No    Medical Power of : No (pt has 6 living children (3 sons, 3 daughters); Jossie is agreeable to update siblings and shares that they typically all agree with pt's care choices)      Decision Making:  Family answered questions and Patient unable to communicate due to disease severity/cognitive impairment  Goals of Care: The family endorses that what is most important right now is to focus on symptom/pain control, quality of life, even if it means sacrificing a little time, improvement in condition but with limits to invasive therapies, and comfort and QOL     Accordingly, we have decided that the best plan to meet the patient's goals includes continuing with treatment and enrolling in hospice care at time of discharge.      Significant Labs: All pertinent labs within the past 24 hours have been reviewed.    CBC:   Recent Labs   Lab 02/25/25  0508   WBC 5.32   HGB 9.0*   HCT 29.4*   MCV 94        BMP:  Recent Labs   Lab 02/25/25  0508   *   *   K 4.5      CO2 19*   BUN 34*   CREATININE 1.4   CALCIUM 7.9*   MG 2.0     LFT:  Lab Results   Component Value Date    AST 17 02/22/2025     ALKPHOS 212 (H) 02/22/2025    BILITOT 0.2 02/22/2025     Albumin:   Albumin   Date Value Ref Range Status   02/22/2025 3.6 3.5 - 5.2 g/dL Final     Protein:   Total Protein   Date Value Ref Range Status   02/22/2025 8.4 6.0 - 8.4 g/dL Final     Lactic acid:   Lab Results   Component Value Date    LACTATE 1.0 12/28/2024    LACTATE 1.0 12/17/2023       Significant Imaging: I have reviewed all pertinent imaging results/findings within the past 24 hours.    Total visit time: 81 minutes    35 min visit time including: face to face time in discussion of symptom assessment, and exploring options and burdens of offered treatments.  This also includes non-face to face time preparing to see the patient including chart review, obtaining and/or reviewing separately obtained history, documenting clinical information in the electronic or other health record, independently interpreting results and communicating results to the patient/family/caregiver, family discussions by phone if not able to be present, coordination of care with other specialists, and discharge planning.     46 min ACP time spent: goals of care, advanced care planning, emotional support, formulating and communicating prognosis, exploring burden/ benefit of various approaches of treatment.     Carolyn Griggs NP  Palliative Medicine  [facility]

## 2025-02-25 NOTE — ASSESSMENT & PLAN NOTE
- daughter shares concerns for pt's medication regimen at NH  - pt's dementia can occasionally include behavioral disturbances which has lead to what daughter feels like could be over medicating and sedating   - discussed role of hospice in symptom and medication management

## 2025-02-25 NOTE — CONSULTS
[facility]  Palliative Medicine  Consult Note    Patient Name: Jossie Morejon  MRN: [id number]  Admission Date: 2/22/2025  Hospital Length of Stay: 2 days  Code Status: Full Code   Attending Provider: Alondra Mckeon, Joselyn  Consulting Provider: Carolyn Griggs NP  Primary Care Physician: Imtiaz Frye MD  Principal Problem:Urinary tract infection without hematuria    Patient information was obtained from past medical records, ER records, and primary team.      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 behavioral disturbance  - chronic condition noted; during 2023 admission palliative assessment pt was FAST scale 7A and PPS 40%  - will discuss current baseline symptoms more with daughter; likely remains hospice appropriate; prior hospice discussions but unclear if pt was ever enrolled in hospice     Renal/  * Urinary tract infection without hematuria  - noted; likely contributing to AMS beyond baseline dementia     Acute renal failure superimposed on stage 3 chronic kidney disease  - noted; plan to discuss overall trajectory of kidney disease and assess future potential GOC in regards to renal function    ID  COVID-19 virus detected  - incidental finding on admission; CM/SW in communication with NH to assess affect on return to NH timing     Palliative Care  ACP (advance care planning)  Consult - 2/24/25  - consult received; interval chart reviewed in detail  - visited pt at bedside; attempted to arouse but sleeping, minimally aroused and allowed to rest   - no family at bedside at time of visit; later revisited to assess for family again   - call placed to number on file; will continue attempts to meet with/contact family for  "GOC/ACP discussion       Thank you for your consult. I will follow-up with patient. Please contact us if you have any additional questions.    Subjective:     HPI:   From H&P: "rene Morejon is a 89 y.o. female who resides at [facility], seizure,Alzheimer disease, A.fib, Hx of breast cancer, CKD, Type 2 DM ( diet controlled), HLD, HTN, Hx of CVA, who resides in a nursing home since last 6 months presenting for evalution of lethargy.     Recent admission 12/30 for a UTI presents with altered mental status.  Daughter was concerned because she was more lethargic than usual.   Based on a note at  baseline she  he is oriented to self and will recognize some family members.  Here today she is awake but will not make eye contact, does appear lethargic. "    Palliative medicine consulted for goals of care discussion and advance care planning; for details of visit, see advance care planning section of plan.       Hospital Course:  No notes on file        Past Medical History:   Diagnosis Date    Alzheimer disease     Breast cancer     CKD (chronic kidney disease)     Diabetes mellitus type II     Dyslipidemia     Dyslipidemia     Essential hypertension, benign     History of CVA (cerebrovascular accident)     Hypercholesteremia     Microalbuminuria     Mild anemia     Seizure disorder        History reviewed. No pertinent surgical history.    Review of patient's allergies indicates:  No Known Allergies    Medications:  Continuous Infusions:   D5W   Intravenous Continuous 75 mL/hr at 02/24/25 1140 Rate Verify at 02/24/25 1140     Scheduled Meds:   carBAMazepine  300 mg Oral BID    carvediloL  3.125 mg Oral BID WM    cefTRIAXone (Rocephin) IV (PEDS and ADULTS)  2 g Intravenous Q24H    gabapentin  300 mg Oral TID    mupirocin   Nasal BID    rivaroxaban  10 mg Oral BID WM     PRN Meds:  Current Facility-Administered Medications:     acetaminophen, 650 mg, Oral, Q4H PRN    melatonin, 6 mg, Oral, Nightly PRN    " ondansetron, 4 mg, Intravenous, Q8H PRN    sodium chloride 0.9%, 2 mL, Intravenous, Q6H PRN    Family History       Problem Relation (Age of Onset)    Diabetes Mother          Tobacco Use    Smoking status: Never    Smokeless tobacco: Never   Substance and Sexual Activity    Alcohol use: No    Drug use: No    Sexual activity: Never       Review of Systems   Unable to perform ROS: Dementia     Objective:     Vital Signs (Most Recent):  Temp: 98.7 °F (37.1 °C) (02/24/25 1941)  Pulse: 63 (02/24/25 1941)  Resp: 18 (02/24/25 1941)  BP: (!) 120/56 (02/24/25 1941)  SpO2: 95 % (02/24/25 1941) Vital Signs (24h Range):  Temp:  [98 °F (36.7 °C)-100.9 °F (38.3 °C)] 98.7 °F (37.1 °C)  Pulse:  [63-91] 63  Resp:  [16-20] 18  SpO2:  [93 %-97 %] 95 %  BP: (106-164)/(53-75) 120/56     Weight: 67.2 kg (148 lb 2.4 oz)  Body mass index is 28.93 kg/m².       Physical Exam  Vitals and nursing note reviewed.   Constitutional:       General: She is sleeping.      Appearance: She is ill-appearing.   HENT:      Head: Normocephalic and atraumatic.   Pulmonary:      Effort: Pulmonary effort is normal. No respiratory distress.            Advance Care Planning  Advance Directives:   Living Will: No    LaPOST: No    Do Not Resuscitate Status: No    Medical Power of : No (per EMR review pt is listed as single; 1 daughter listed on emergency contacts but an additional daughter mentioned in prior ACP notes; pt's children collectively would share legal surrogiae decision making if no POA docs exist)      Decision Making:  Patient unable to communicate due to disease severity/cognitive impairment  Goals of Care: Plan for GOC/ACP discussion with family.          Significant Labs: All pertinent labs within the past 24 hours have been reviewed.  CBC:   Recent Labs   Lab 02/24/25  0509   WBC 5.73   HGB 7.8*   HCT 26.2*   MCV 96        BMP:  Recent Labs   Lab 02/24/25  0509   *      K 4.3      CO2 19*   BUN 30   CREATININE  1.5*   CALCIUM 8.1*   MG 2.2     LFT:  Lab Results   Component Value Date    AST 17 02/22/2025    ALKPHOS 212 (H) 02/22/2025    BILITOT 0.2 02/22/2025     Albumin:   Albumin   Date Value Ref Range Status   02/22/2025 3.6 3.5 - 5.2 g/dL Final     Protein:   Total Protein   Date Value Ref Range Status   02/22/2025 8.4 6.0 - 8.4 g/dL Final     Lactic acid:   Lab Results   Component Value Date    LACTATE 1.0 12/28/2024    LACTATE 1.0 12/17/2023       Significant Imaging: I have reviewed all pertinent imaging results/findings within the past 24 hours.      I spent a total of 50 minutes on the day of the visit. This includes face to face time in discussion of goals of care, symptom assessment, coordination of care and emotional support.  This also includes non-face to face time preparing to see the patient (eg, review of tests/imaging), obtaining and/or reviewing separately obtained history, documenting clinical information in the electronic or other health record, independently interpreting results and communicating results to the patient/family/caregiver, or care coordinator.    Carolyn Griggs, NP  Palliative Medicine  [facility]

## 2025-02-25 NOTE — SUBJECTIVE & OBJECTIVE
Past Medical History:   Diagnosis Date    Alzheimer disease     Breast cancer     CKD (chronic kidney disease)     Diabetes mellitus type II     Dyslipidemia     Dyslipidemia     Essential hypertension, benign     History of CVA (cerebrovascular accident)     Hypercholesteremia     Microalbuminuria     Mild anemia     Seizure disorder        History reviewed. No pertinent surgical history.    Review of patient's allergies indicates:  No Known Allergies    Medications:  Continuous Infusions:   D5W   Intravenous Continuous 75 mL/hr at 02/24/25 1140 Rate Verify at 02/24/25 1140     Scheduled Meds:   carBAMazepine  300 mg Oral BID    carvediloL  3.125 mg Oral BID WM    cefTRIAXone (Rocephin) IV (PEDS and ADULTS)  2 g Intravenous Q24H    gabapentin  300 mg Oral TID    mupirocin   Nasal BID    rivaroxaban  10 mg Oral BID WM     PRN Meds:  Current Facility-Administered Medications:     acetaminophen, 650 mg, Oral, Q4H PRN    melatonin, 6 mg, Oral, Nightly PRN    ondansetron, 4 mg, Intravenous, Q8H PRN    sodium chloride 0.9%, 2 mL, Intravenous, Q6H PRN    Family History       Problem Relation (Age of Onset)    Diabetes Mother          Tobacco Use    Smoking status: Never    Smokeless tobacco: Never   Substance and Sexual Activity    Alcohol use: No    Drug use: No    Sexual activity: Never       Review of Systems   Unable to perform ROS: Dementia     Objective:     Vital Signs (Most Recent):  Temp: 98.7 °F (37.1 °C) (02/24/25 1941)  Pulse: 63 (02/24/25 1941)  Resp: 18 (02/24/25 1941)  BP: (!) 120/56 (02/24/25 1941)  SpO2: 95 % (02/24/25 1941) Vital Signs (24h Range):  Temp:  [98 °F (36.7 °C)-100.9 °F (38.3 °C)] 98.7 °F (37.1 °C)  Pulse:  [63-91] 63  Resp:  [16-20] 18  SpO2:  [93 %-97 %] 95 %  BP: (106-164)/(53-75) 120/56     Weight: 67.2 kg (148 lb 2.4 oz)  Body mass index is 28.93 kg/m².       Physical Exam  Vitals and nursing note reviewed.   Constitutional:       General: She is sleeping.      Appearance: She is  ill-appearing.   HENT:      Head: Normocephalic and atraumatic.   Pulmonary:      Effort: Pulmonary effort is normal. No respiratory distress.            Advance Care Planning   Advance Directives:   Living Will: No    LaPOST: No    Do Not Resuscitate Status: No    Medical Power of : No (per EMR review pt is listed as single; 1 daughter listed on emergency contacts but an additional daughter mentioned in prior ACP notes; pt's children collectively would share legal surrogiae decision making if no POA docs exist)      Decision Making:  Patient unable to communicate due to disease severity/cognitive impairment  Goals of Care: Plan for GOC/ACP discussion with family.          Significant Labs: All pertinent labs within the past 24 hours have been reviewed.  CBC:   Recent Labs   Lab 02/24/25  0509   WBC 5.73   HGB 7.8*   HCT 26.2*   MCV 96        BMP:  Recent Labs   Lab 02/24/25  0509   *      K 4.3      CO2 19*   BUN 30   CREATININE 1.5*   CALCIUM 8.1*   MG 2.2     LFT:  Lab Results   Component Value Date    AST 17 02/22/2025    ALKPHOS 212 (H) 02/22/2025    BILITOT 0.2 02/22/2025     Albumin:   Albumin   Date Value Ref Range Status   02/22/2025 3.6 3.5 - 5.2 g/dL Final     Protein:   Total Protein   Date Value Ref Range Status   02/22/2025 8.4 6.0 - 8.4 g/dL Final     Lactic acid:   Lab Results   Component Value Date    LACTATE 1.0 12/28/2024    LACTATE 1.0 12/17/2023       Significant Imaging: I have reviewed all pertinent imaging results/findings within the past 24 hours.

## 2025-02-25 NOTE — ASSESSMENT & PLAN NOTE
- incidental finding on admission; CM/SW in communication with NH to assess affect on return to NH timing

## 2025-02-25 NOTE — NURSING
[facility]  Nurses Note -- 4 Eyes      2/24/2025       Skin assessed on: Q Shift      [x] No Pressure Injuries Present    [x]Prevention Measures Documented    [] Yes LDA  for Pressure Injury Previously documented     [] Yes New Pressure Injury Discovered   [] LDA for New Pressure Injury Added      Attending RN:  Martina Gale RN     Second RN:  Viri Landa Rn

## 2025-02-25 NOTE — SUBJECTIVE & OBJECTIVE
Medications:  Continuous Infusions:  Scheduled Meds:   amLODIPine  5 mg Oral Daily    carBAMazepine  300 mg Oral BID    carvediloL  3.125 mg Oral BID WM    cefTRIAXone (Rocephin) IV (PEDS and ADULTS)  2 g Intravenous Q24H    gabapentin  300 mg Oral TID    mupirocin   Nasal BID    rivaroxaban  10 mg Oral BID WM     PRN Meds:  Current Facility-Administered Medications:     acetaminophen, 650 mg, Oral, Q4H PRN    melatonin, 6 mg, Oral, Nightly PRN    ondansetron, 4 mg, Intravenous, Q8H PRN    sodium chloride 0.9%, 2 mL, Intravenous, Q6H PRN    Objective:     Vital Signs (Most Recent):  Temp: 98.4 °F (36.9 °C) (02/25/25 1243)  Pulse: 67 (02/25/25 1243)  Resp: (!) 22 (02/25/25 1243)  BP: 138/80 (02/25/25 1243)  SpO2: 98 % (02/25/25 1243) Vital Signs (24h Range):  Temp:  [98.3 °F (36.8 °C)-100.9 °F (38.3 °C)] 98.4 °F (36.9 °C)  Pulse:  [63-72] 67  Resp:  [16-22] 22  SpO2:  [95 %-100 %] 98 %  BP: (120-162)/(53-82) 138/80     Weight: 67.2 kg (148 lb 2.4 oz)  Body mass index is 28.93 kg/m².       Physical Exam  Vitals and nursing note reviewed.   Constitutional:       Appearance: She is ill-appearing.      Comments: Elderly, frail; napping but able to arouse easier    HENT:      Head: Normocephalic and atraumatic.   Pulmonary:      Effort: Pulmonary effort is normal. No respiratory distress.   Neurological:      Mental Status: She is easily aroused. She is disoriented.       Advance Care Planning   Advance Directives:   Living Will: No    LaPOST: No    Medical Power of : No (pt has 6 living children (3 sons, 3 daughters); Jossie is agreeable to update siblings and shares that they typically all agree with pt's care choices)      Decision Making:  Family answered questions and Patient unable to communicate due to disease severity/cognitive impairment  Goals of Care: The family endorses that what is most important right now is to focus on symptom/pain control, quality of life, even if it means sacrificing a little  time, improvement in condition but with limits to invasive therapies, and comfort and QOL     Accordingly, we have decided that the best plan to meet the patient's goals includes continuing with treatment and enrolling in hospice care at time of discharge.      Significant Labs: All pertinent labs within the past 24 hours have been reviewed.    CBC:   Recent Labs   Lab 02/25/25  0508   WBC 5.32   HGB 9.0*   HCT 29.4*   MCV 94        BMP:  Recent Labs   Lab 02/25/25  0508   *   *   K 4.5      CO2 19*   BUN 34*   CREATININE 1.4   CALCIUM 7.9*   MG 2.0     LFT:  Lab Results   Component Value Date    AST 17 02/22/2025    ALKPHOS 212 (H) 02/22/2025    BILITOT 0.2 02/22/2025     Albumin:   Albumin   Date Value Ref Range Status   02/22/2025 3.6 3.5 - 5.2 g/dL Final     Protein:   Total Protein   Date Value Ref Range Status   02/22/2025 8.4 6.0 - 8.4 g/dL Final     Lactic acid:   Lab Results   Component Value Date    LACTATE 1.0 12/28/2024    LACTATE 1.0 12/17/2023       Significant Imaging: I have reviewed all pertinent imaging results/findings within the past 24 hours.

## 2025-02-25 NOTE — HPI
"From H&P: "rene Morejon is a 89 y.o. female who resides at [facility], seizure,Alzheimer disease, A.fib, Hx of breast cancer, CKD, Type 2 DM ( diet controlled), HLD, HTN, Hx of CVA, who resides in a nursing home since last 6 months presenting for evalution of lethargy.     Recent admission 12/30 for a UTI presents with altered mental status.  Daughter was concerned because she was more lethargic than usual.   Based on a note at  baseline she  he is oriented to self and will recognize some family members.  Here today she is awake but will not make eye contact, does appear lethargic. "    Palliative medicine consulted for goals of care discussion and advance care planning; for details of visit, see advance care planning section of plan.     "

## 2025-02-25 NOTE — ASSESSMENT & PLAN NOTE
- noted; plan to discuss overall trajectory of kidney disease and assess future potential GOC in regards to renal function

## 2025-02-25 NOTE — ASSESSMENT & PLAN NOTE
Consult - 2/24/25  - consult received; interval chart reviewed in detail  - visited pt at bedside; attempted to arouse but sleeping, minimally aroused and allowed to rest   - no family at bedside at time of visit; later revisited to assess for family again   - call placed to number on file; will continue attempts to meet with/contact family for GOC/ACP discussion

## 2025-02-26 LAB
ANION GAP SERPL CALC-SCNC: 9 MMOL/L (ref 8–16)
BASOPHILS # BLD AUTO: 0.02 K/UL (ref 0–0.2)
BASOPHILS NFR BLD: 0.3 % (ref 0–1.9)
BUN SERPL-MCNC: 33 MG/DL (ref 10–30)
CALCIUM SERPL-MCNC: 7.9 MG/DL (ref 8.7–10.5)
CHLORIDE SERPL-SCNC: 106 MMOL/L (ref 95–110)
CO2 SERPL-SCNC: 20 MMOL/L (ref 23–29)
CREAT SERPL-MCNC: 1.5 MG/DL (ref 0.5–1.4)
DIFFERENTIAL METHOD BLD: ABNORMAL
EOSINOPHIL # BLD AUTO: 0.2 K/UL (ref 0–0.5)
EOSINOPHIL NFR BLD: 2.5 % (ref 0–8)
ERYTHROCYTE [DISTWIDTH] IN BLOOD BY AUTOMATED COUNT: 14.5 % (ref 11.5–14.5)
EST. GFR  (NO RACE VARIABLE): 32 ML/MIN/1.73 M^2
GLUCOSE SERPL-MCNC: 105 MG/DL (ref 70–110)
HCT VFR BLD AUTO: 26.9 % (ref 37–48.5)
HGB BLD-MCNC: 8.4 G/DL (ref 12–16)
IMM GRANULOCYTES # BLD AUTO: 0.04 K/UL (ref 0–0.04)
IMM GRANULOCYTES NFR BLD AUTO: 0.6 % (ref 0–0.5)
LYMPHOCYTES # BLD AUTO: 1.5 K/UL (ref 1–4.8)
LYMPHOCYTES NFR BLD: 23.7 % (ref 18–48)
MAGNESIUM SERPL-MCNC: 2.1 MG/DL (ref 1.6–2.6)
MCH RBC QN AUTO: 28.8 PG (ref 27–31)
MCHC RBC AUTO-ENTMCNC: 31.2 G/DL (ref 32–36)
MCV RBC AUTO: 92 FL (ref 82–98)
MONOCYTES # BLD AUTO: 0.8 K/UL (ref 0.3–1)
MONOCYTES NFR BLD: 11.6 % (ref 4–15)
NEUTROPHILS # BLD AUTO: 4 K/UL (ref 1.8–7.7)
NEUTROPHILS NFR BLD: 61.3 % (ref 38–73)
NRBC BLD-RTO: 0 /100 WBC
PLATELET # BLD AUTO: 255 K/UL (ref 150–450)
PMV BLD AUTO: 10 FL (ref 9.2–12.9)
POTASSIUM SERPL-SCNC: 4.5 MMOL/L (ref 3.5–5.1)
RBC # BLD AUTO: 2.92 M/UL (ref 4–5.4)
SODIUM SERPL-SCNC: 135 MMOL/L (ref 136–145)
WBC # BLD AUTO: 6.45 K/UL (ref 3.9–12.7)

## 2025-02-26 PROCEDURE — 25000003 PHARM REV CODE 250: Performed by: STUDENT IN AN ORGANIZED HEALTH CARE EDUCATION/TRAINING PROGRAM

## 2025-02-26 PROCEDURE — 83735 ASSAY OF MAGNESIUM: CPT | Performed by: STUDENT IN AN ORGANIZED HEALTH CARE EDUCATION/TRAINING PROGRAM

## 2025-02-26 PROCEDURE — 80048 BASIC METABOLIC PNL TOTAL CA: CPT | Performed by: STUDENT IN AN ORGANIZED HEALTH CARE EDUCATION/TRAINING PROGRAM

## 2025-02-26 PROCEDURE — 11000001 HC ACUTE MED/SURG PRIVATE ROOM

## 2025-02-26 PROCEDURE — 99497 ADVNCD CARE PLAN 30 MIN: CPT | Mod: ,,, | Performed by: REGISTERED NURSE

## 2025-02-26 PROCEDURE — 85025 COMPLETE CBC W/AUTO DIFF WBC: CPT | Performed by: STUDENT IN AN ORGANIZED HEALTH CARE EDUCATION/TRAINING PROGRAM

## 2025-02-26 PROCEDURE — 27000207 HC ISOLATION

## 2025-02-26 PROCEDURE — 92526 ORAL FUNCTION THERAPY: CPT

## 2025-02-26 PROCEDURE — 25000003 PHARM REV CODE 250: Performed by: HOSPITALIST

## 2025-02-26 PROCEDURE — 99233 SBSQ HOSP IP/OBS HIGH 50: CPT | Mod: ,,, | Performed by: REGISTERED NURSE

## 2025-02-26 PROCEDURE — 36415 COLL VENOUS BLD VENIPUNCTURE: CPT | Performed by: STUDENT IN AN ORGANIZED HEALTH CARE EDUCATION/TRAINING PROGRAM

## 2025-02-26 PROCEDURE — 63600175 PHARM REV CODE 636 W HCPCS: Performed by: HOSPITALIST

## 2025-02-26 RX ORDER — CEFTRIAXONE 1 G/1
1 INJECTION, POWDER, FOR SOLUTION INTRAMUSCULAR; INTRAVENOUS
Status: DISCONTINUED | OUTPATIENT
Start: 2025-02-26 | End: 2025-02-27

## 2025-02-26 RX ORDER — AMLODIPINE BESYLATE 5 MG/1
10 TABLET ORAL DAILY
Status: DISCONTINUED | OUTPATIENT
Start: 2025-02-27 | End: 2025-02-26

## 2025-02-26 RX ORDER — GUAIFENESIN 100 MG/5ML
200 SOLUTION ORAL EVERY 4 HOURS PRN
Status: DISCONTINUED | OUTPATIENT
Start: 2025-02-26 | End: 2025-02-27 | Stop reason: HOSPADM

## 2025-02-26 RX ORDER — GABAPENTIN 100 MG/1
100 CAPSULE ORAL 3 TIMES DAILY
Status: DISCONTINUED | OUTPATIENT
Start: 2025-02-26 | End: 2025-02-27 | Stop reason: HOSPADM

## 2025-02-26 RX ADMIN — CARBAMAZEPINE 300 MG: 100 TABLET, CHEWABLE ORAL at 09:02

## 2025-02-26 RX ADMIN — GABAPENTIN 300 MG: 300 CAPSULE ORAL at 08:02

## 2025-02-26 RX ADMIN — CARVEDILOL 3.12 MG: 3.12 TABLET, FILM COATED ORAL at 05:02

## 2025-02-26 RX ADMIN — AMLODIPINE BESYLATE 5 MG: 5 TABLET ORAL at 08:02

## 2025-02-26 RX ADMIN — GABAPENTIN 100 MG: 100 CAPSULE ORAL at 09:02

## 2025-02-26 RX ADMIN — CARVEDILOL 3.12 MG: 3.12 TABLET, FILM COATED ORAL at 08:02

## 2025-02-26 RX ADMIN — MUPIROCIN: 20 OINTMENT TOPICAL at 08:02

## 2025-02-26 RX ADMIN — CARBAMAZEPINE 300 MG: 100 TABLET, CHEWABLE ORAL at 08:02

## 2025-02-26 RX ADMIN — RIVAROXABAN 10 MG: 10 TABLET, FILM COATED ORAL at 08:02

## 2025-02-26 RX ADMIN — MUPIROCIN: 20 OINTMENT TOPICAL at 09:02

## 2025-02-26 RX ADMIN — CEFTRIAXONE 1 G: 1 INJECTION, POWDER, FOR SOLUTION INTRAMUSCULAR; INTRAVENOUS at 11:02

## 2025-02-26 RX ADMIN — GABAPENTIN 100 MG: 100 CAPSULE ORAL at 03:02

## 2025-02-26 RX ADMIN — RIVAROXABAN 10 MG: 10 TABLET, FILM COATED ORAL at 05:02

## 2025-02-26 NOTE — NURSING
Pt responds to verbal stimuli. Able to verbalize needs. Tolerates medications well by mouth, Repositioned q 2hrs, pain controlled by PRN pain medication, plan of care explained, Pt denies pain and n,v,d. Remains free from falls and hospital acquired pressure injuries, safety maintained. Will continue following plan of care.

## 2025-02-26 NOTE — PLAN OF CARE
Patient alert and confused. Fall risk monitor at the bedside this shift. No acute distress noted, patient free from falls or injury this shift.  Bed in low position, wheels locked, call light in reach for assistance, plan of care continued.      Problem: Diabetes Comorbidity  Goal: Blood Glucose Level Within Targeted Range  Outcome: Progressing     Problem: Fall Injury Risk  Goal: Absence of Fall and Fall-Related Injury  Outcome: Progressing     Problem: Skin Injury Risk Increased  Goal: Skin Health and Integrity  Outcome: Progressing     Problem: Acute Kidney Injury/Impairment  Goal: Fluid and Electrolyte Balance  Outcome: Progressing

## 2025-02-26 NOTE — PROGRESS NOTES
[facility]  Progress Note    Patient Name: Jossie Morejon  MRN: [id number]  Patient Class: IP- Inpatient   Admission Date: 2/22/2025  Length of Stay: 4 days  Attending Physician: Alondra Mckeon, *  Primary Care Provider: Imtiaz Frye MD        Subjective     Principal Problem:Urinary tract infection without hematuria        HPI:  Jossie Morejon is a 89 y.o. female who resides at [facility], seizure,Alzheimer disease, A.fib, Hx of breast cancer, CKD, Type 2 DM ( diet controlled), HLD, HTN, Hx of CVA, who resides in a nursing home since last 6 months presenting for evalution of lethargy.    Recent admission 12/30 for a UTI presents with altered mental status.  Daughter was concerned because she was more lethargic than usual.   Based on a note at  baseline she  he is oriented to self and will recognize some family members.  Here today she is awake but will not make eye contact, does appear lethargic.     In the ED patient noted to have     Initial Vitals [02/22/25 1802]  BP Pulse Resp Temp SpO2  (!) 167/73 81 16 98.5 °F (36.9 °C) (!) 94 %       Labs show - CHRISTO, Cr at baseline is 1.3 ( currently 1.7) elevated AlkP 212 eGFR of 28, BNP 69, Trops 0.016, stable chronic anemia, increased immature cells and neutrophils. UA notable for bacteria, >100 WBCs and clumps c/f UTI, urine cultures sent.   COVID positive. CT head negative.  In the ED, she later developed a fever of 100.7 while here. Given 2 g of Rocephin. Admit to Hospital medicine for UTI and COVID monitoring    Overview/Hospital Course:  Jossie Morejon is a 89 y.o. female who resides at [facility], seizure,Alzheimer disease, A.fib, Hx of breast cancer, CKD, Type 2 DM ( diet controlled), HLD, HTN, Hx of CVA, who resides in a nursing home since last 6 months presenting for evalution of lethargy.Labs show - CHRISTO, Cr at baseline is 1.3 ( currently 1.7) elevated AlkP 212 eGFR of 28, BNP 69, Trops 0.016, stable  chronic anemia, increased immature cells and neutrophils. UA notable for bacteria, >100 WBCs and clumps c/f UTI, urine cultures sent.   COVID positive. CT head negative.  In the ED, she later developed a fever of 100.7 while here. Given 2 g of Rocephin. Admit to Hospital medicine for UTI and COVID monitoring.  patient is still confused DC Tegretol and ativan and narcotics and check Tegretol level.resumed on lower Tegretol level.  Consulted ST and adjusted diet.  CHRISTO is improving on gentle IVF.  Urine culture grow Ecoli,on Rocephin.  Much more alert ,will do also head CT..  Consulted palliative.daughter interested in NH with Hospice.      Interval History: patient is still confused DC Tegretol and ativan and narcotics and check Tegretol level.resumed on lower dose.  Consulted ST and adjusted diet.  Urine culture grow Ecoli,on Rocephin.  Much more alert,will do also head CT..  Consulted palliative.daughter interested in NH with Hospice.      Review of Systems   Constitutional:  Positive for activity change and appetite change.   Neurological:  Positive for weakness.   Psychiatric/Behavioral:  Positive for confusion.      Objective:     Vital Signs (Most Recent):  Temp: 97.7 °F (36.5 °C) (02/26/25 0739)  Pulse: 73 (02/26/25 0800)  Resp: 18 (02/26/25 0739)  BP: (!) 153/65 (02/26/25 0739)  SpO2: (!) 94 % (02/26/25 0739) Vital Signs (24h Range):  Temp:  [97.7 °F (36.5 °C)-100 °F (37.8 °C)] 97.7 °F (36.5 °C)  Pulse:  [67-83] 73  Resp:  [18-22] 18  SpO2:  [93 %-98 %] 94 %  BP: ()/(61-80) 153/65     Weight: 67.2 kg (148 lb 2.4 oz)  Body mass index is 28.93 kg/m².    Intake/Output Summary (Last 24 hours) at 2/26/2025 1030  Last data filed at 2/26/2025 0800  Gross per 24 hour   Intake 600 ml   Output 1850 ml   Net -1250 ml         Physical Exam  Constitutional:       Appearance: She is ill-appearing.   Cardiovascular:      Rate and Rhythm: Normal rate.   Pulmonary:      Effort: No respiratory distress.   Abdominal:       General: There is no distension.   Skin:     Coloration: Skin is not jaundiced.             Significant Labs: All pertinent labs within the past 24 hours have been reviewed.  BMP:   Recent Labs   Lab 02/26/25  0442      *   K 4.5      CO2 20*   BUN 33*   CREATININE 1.5*   CALCIUM 7.9*   MG 2.1     CBC:   Recent Labs   Lab 02/25/25  0508 02/26/25  0442   WBC 5.32 6.45   HGB 9.0* 8.4*   HCT 29.4* 26.9*    255     CMP:   Recent Labs   Lab 02/25/25  0508 02/26/25  0442   * 135*   K 4.5 4.5    106   CO2 19* 20*   * 105   BUN 34* 33*   CREATININE 1.4 1.5*   CALCIUM 7.9* 7.9*   ANIONGAP 10 9       Significant Imaging: I have reviewed all pertinent imaging results/findings within the past 24 hours.    Assessment and Plan     * Urinary tract infection without hematuria  Patient with altered mental status and UA suggestive of infection. She is unable to communicate regarding symptoms therefore will treat with 3 days of ceftriaxone.  Given multiple prior Uas with similar findings patient likely has a colonization   Monitor fever curve and leucocytosis.      Urine culture growing. GNR,on Rocephin.    ACP (advance care planning)    Consulted palliative.daughter interested in NH with Hospice.  Spent over 5 minutes.      Acute renal failure superimposed on stage 3 chronic kidney disease  CHRISTO is likely due to pre-renal azotemia due to dehydration. Baseline creatinine is  1.4 . Most recent creatinine and eGFR are listed below.  Recent Labs     02/22/25  1837 02/23/25  0416   CREATININE 1.7* 1.4   EGFRNORACEVR 28* 35*      Plan  - CHRISTO is improving  - Avoid nephrotoxins and renally dose meds for GFR listed above  - Monitor urine output, serial BMP, and adjust therapy as needed  -     Acute metabolic encephalopathy    patient is still confused DC Tegretol and ativan and narcotics and check Tegretol level.resumed lower diose.  Consulted ST and adjusted diet.  Much more alert ,check head  CT      A-fib  Patient has paroxysmal (<7 days) atrial fibrillation. Patient is currently in sinus rhythm. [id number] Score: 4. The patients heart rate in the last 24 hours is as follows:  Pulse  Min: 81  Max: 88     Antiarrhythmics   None    Anticoagulants  , 2 times daily with meals, Oral  rivaroxaban tablet 10 mg, 2 times daily with meals, Oral    Plan  - Replete lytes with a goal of K>4, Mg >2  - Patient is anticoagulated, see medications listed above.  - Patient's afib is currently controlled        Disorientation  Patient has poor baseline mental status but reportedly off her usual self since this AM. Likely contributed metabolic derangements from UTI may be contributing  CT head with no acute structural changes  Plan:  - Treatment for UTI with ctx  - Additiona Delirium precautions: avoid offending meds (anticholinergic, antihistamine, benzo, anti-dopamine), promote sleep-wake cycles.  Improving.      COVID-19 virus detected  Patient noted to be positive for COVID but does not have any signs of respiratory distress. On room air with starts ~97-98%  - Will give remdesavir due to patient's age and risk of severe infection    Seizure disorder  No witnessed seizure,     DC Tegretol and ativan and narcotics and check Tegretol level.  Resumed on lower dose.        HTN, goal below 140/90  Patient's blood pressure range in the last 24 hours was: BP  Min: 160/74  Max: 180/83.The patient's inpatient anti-hypertensive regimen is listed below:  Current Antihypertensives  carvediloL tablet 3.125 mg, 2 times daily with meals, Oral    Plan  - BP is uncontrolled, will adjust as follows: restart home meds as tolerated      Chronic kidney disease, stage III (moderate)  Creatine stable for now. BMP reviewed- noted Estimated Creatinine Clearance: 17.8 mL/min (A) (based on SCr of 1.7 mg/dL (H)). according to latest data. 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  Monitor UOP and serial BMP and adjust therapy as  needed. Renally dose meds. Avoid nephrotoxic medications and procedures.    Diabetes mellitus with renal manifestations, controlled  Patient's FSGs are controlled on current medication regimen.  Last A1c reviewed-   Lab Results   Component Value Date    HGBA1C 5.7 (H) 12/29/2024   - Hold Oral hypoglycemics while patient is in the hospital.  - Monitor BMP daily for glucose levels      VTE Risk Mitigation (From admission, onward)           Ordered     rivaroxaban tablet 10 mg  2 times daily with meals         02/22/25 2254     IP VTE HIGH RISK PATIENT  Once         02/22/25 2253     Place sequential compression device  Until discontinued         02/22/25 2253                    Discharge Planning   DEDE: 2/28/2025     Code Status: Full Code   Medical Readiness for Discharge Date:   Discharge Plan A: Return to nursing home                        Alondra Mckeon MD  Department of Hospital Medicine   [facility] [place] - Med Surg

## 2025-02-26 NOTE — ASSESSMENT & PLAN NOTE
patient is still confused DC Tegretol and ativan and narcotics and check Tegretol level.resumed lower diose.  Consulted ST and adjusted diet.  Much more alert ,check head CT

## 2025-02-26 NOTE — PROGRESS NOTES
[facility] Surg  Palliative Medicine  Progress Note    Patient Name: Jossie Morejon  MRN: [id number]  Admission Date: 2025  Hospital Length of Stay: 4 days  Code Status: DNR   Attending Provider: Alondra Mckeon, *  Consulting Provider: Carolyn Griggs NP  Primary Care Physician: Imtiaz Frye MD  Principal Problem:Urinary tract infection without hematuria    Patient information was obtained from relative(s) and primary team.      Assessment/Plan:   Palliative Care  Advance Care Planning   2025  - interval chart reviewed; discussed pt with HM  - pt unable to participate in GOC/ACP discussion   - call placed to pt's daughter Jossie and met with pt's son Kiran   - family in agreement for DNR and NH with hospice; confirmed with family and HM that DNR order would be placed in chart   - LAPOST to be completed with hospice intake team   - in depth discussion with son on trajectory of dementia, philosophy of hospice care, and pt specific recommendations; HM, Dr. Nichole, also joined discussion to provide medical update and supported recommendation for NH with hospice   - emotional support provided; answered all questions/concerns   - updated MDT     2025  - interval chart reviewed  - able to arouse pt easier today from nap, still not able to appropriately answer questions and lethargic so allowed to rest   - call placed to daughterJossie for GOC/ACP discussion   - learned more about pt outside of hospitalization; pt has been a resident of [facility] since 2024, daughter shared concerns regarding pt's decline and overall care at NH and that she has explored other options, but unfortunately feel issues she has would be faced at alternate facilities as well so she has been communicating concerns to NH   - pt has 6 living children (1 ); Jossie shares that she has been pt's primary care giver and typically arranges pt's care; Jossie is agreeable to updated pt's children and  shares that they all usually agree with pt's care decisions; all 6 children collectively share legal surrogate decision making   - pt previously lived with Jossie and did very well (even improved to no longer qualify for hospice in the past under her care), but due to work demands and not being able to afford private sitters NH became necessary   - pt was previously under hospice care with [facility]; reviewed pt's current qualification for hospice, which daughter shared relief to hear as they have been hoping to include [facility] into pt's care at NH; daughter agreeable to meeting with [facility] and for pt eval   - reviewed current full code status and alternative/recommendation for DNR; reviewed potential interventions, outcomes, and risks; Jossie (daughter) agrees that DNR would be best for pt at this time, but wishes to discuss with pt's other children before formally changing code status   - discussed plan for hospice evaluation and for follow up call to confirm wishes for code status   - emotional support provided; answered all questions   - updated MH and CM     Consult - 2/24/25  - consult received; interval chart reviewed in detail  - visited pt at bedside; attempted to arouse but sleeping, minimally aroused and allowed to rest   - no family at bedside at time of visit; later revisited to assess for family again   - call placed to number on file; will continue attempts to meet with/contact family for GOC/ACP discussion     Neuro  Acute metabolic encephalopathy  - daughter shares concerns for pt's medication regimen at NH  - pt's dementia can occasionally include behavioral disturbances which has lead to what daughter feels like could be over medicating and sedating   - discussed role of hospice in symptom and medication management     Late onset Alzheimer's disease without behavioral disturbance  - at most recent baseline pt is wheelchair and bed bound; pt able to assist some with  transfers but has been experiencing ongoing deconditioning since being at the NH since July 2024   - pt is incontinent to bowel and bladder and requires assistance with ADLs   - PPS 30% and FAST Scale 7C; hospice appropriate       Renal/  * Urinary tract infection without hematuria  - noted; likely contributing to AMS beyond baseline dementia   - daughter reports frequent UTI's related to incontinence and shares her concerns for lack of appropriate perineal care at NH   - frequent UTI's contribute to appropriateness for hospice     ID  COVID-19 virus detected  - incidental finding on admission; CM/SW in communication with NH to assess affect on return to NH timing     I will follow-up with patient. Please contact us if you have any additional questions.    Total visit time: 85 minutes    35 min visit time including: face to face time in discussion of symptom assessment, and exploring options and burdens of offered treatments.  This also includes non-face to face time preparing to see the patient including chart review, obtaining and/or reviewing separately obtained history, documenting clinical information in the electronic or other health record, independently interpreting results and communicating results to the patient/family/caregiver, family discussions by phone if not able to be present, coordination of care with other specialists, and discharge planning.     50 min ACP time spent: goals of care, advanced care planning, emotional support, formulating and communicating prognosis, exploring burden/ benefit of various approaches of treatment.     Carolyn Griggs NP  Palliative Medicine  [facility]      Subjective:     Chief Complaint:   Chief Complaint   Patient presents with    Fatigue     Pt BIB EMS, due to family wanting pt evaluated. Per family, pt appears more lethargic than usual. Pt has hx of dementia. Pt to ED from [place]. Per nurse, this is pt's normal mentation but family  "requests EMS whenever they visit.        HPI:   From H&P: "rene Morejon is a 89 y.o. female who resides at [facility], seizure,Alzheimer disease, A.fib, Hx of breast cancer, CKD, Type 2 DM ( diet controlled), HLD, HTN, Hx of CVA, who resides in a nursing home since last 6 months presenting for evalution of lethargy.     Recent admission 12/30 for a UTI presents with altered mental status.  Daughter was concerned because she was more lethargic than usual.   Based on a note at  baseline she  he is oriented to self and will recognize some family members.  Here today she is awake but will not make eye contact, does appear lethargic. "    Palliative medicine consulted for goals of care discussion and advance care planning; for details of visit, see advance care planning section of plan.       Hospital Course:  No notes on file    Medications:  Continuous Infusions:  Scheduled Meds:   [START ON 2/27/2025] amLODIPine  10 mg Oral Daily    carBAMazepine  300 mg Oral BID    carvediloL  3.125 mg Oral BID WM    cefTRIAXone (Rocephin) IV (PEDS and ADULTS)  1 g Intravenous Q24H    gabapentin  100 mg Oral TID    mupirocin   Nasal BID    rivaroxaban  10 mg Oral BID WM     PRN Meds:  Current Facility-Administered Medications:     acetaminophen, 650 mg, Oral, Q4H PRN    guaiFENesin 100 mg/5 ml, 200 mg, Oral, Q4H PRN    melatonin, 6 mg, Oral, Nightly PRN    ondansetron, 4 mg, Intravenous, Q8H PRN    sodium chloride 0.9%, 2 mL, Intravenous, Q6H PRN    Objective:     Vital Signs (Most Recent):  Temp: 98.2 °F (36.8 °C) (02/26/25 1220)  Pulse: 63 (02/26/25 1220)  Resp: 18 (02/26/25 1220)  BP: (!) 109/56 (02/26/25 1220)  SpO2: 97 % (02/26/25 1220) Vital Signs (24h Range):  Temp:  [97.7 °F (36.5 °C)-100 °F (37.8 °C)] 98.2 °F (36.8 °C)  Pulse:  [63-83] 63  Resp:  [18-21] 18  SpO2:  [93 %-97 %] 97 %  BP: ()/(56-75) 109/56     Weight: 67.2 kg (148 lb 2.4 oz)  Body mass index is 28.93 kg/m².       Physical Exam  Vitals and " nursing note reviewed.   Constitutional:       Appearance: She is ill-appearing.      Comments: Elderly, frail; napping but able to arouse easier    HENT:      Head: Normocephalic and atraumatic.   Pulmonary:      Effort: Pulmonary effort is normal. No respiratory distress.   Neurological:      Mental Status: She is easily aroused. She is disoriented.       Advance Care Planning   Advance Directives:   Living Will: No    LaPOST: No    Medical Power of : No (pt has 6 living children (3 sons, 3 daughters); Jossie is agreeable to update siblings and shares that they typically all agree with pt's care choices)      Decision Making:  Family answered questions and Patient unable to communicate due to disease severity/cognitive impairment  Goals of Care: The family endorses that what is most important right now is to focus on symptom/pain control, quality of life, even if it means sacrificing a little time, improvement in condition but with limits to invasive therapies, and comfort and QOL     Accordingly, we have decided that the best plan to meet the patient's goals includes continuing with treatment and enrolling in hospice care at time of discharge.      Significant Labs: All pertinent labs within the past 24 hours have been reviewed.    CBC:   Recent Labs   Lab 02/26/25  0442   WBC 6.45   HGB 8.4*   HCT 26.9*   MCV 92        BMP:  Recent Labs   Lab 02/26/25 0442      *   K 4.5      CO2 20*   BUN 33*   CREATININE 1.5*   CALCIUM 7.9*   MG 2.1     LFT:  Lab Results   Component Value Date    AST 17 02/22/2025    ALKPHOS 212 (H) 02/22/2025    BILITOT 0.2 02/22/2025     Albumin:   Albumin   Date Value Ref Range Status   02/22/2025 3.6 3.5 - 5.2 g/dL Final     Protein:   Total Protein   Date Value Ref Range Status   02/22/2025 8.4 6.0 - 8.4 g/dL Final     Lactic acid:   Lab Results   Component Value Date    LACTATE 1.0 12/28/2024    LACTATE 1.0 12/17/2023       Significant Imaging: I  have reviewed all pertinent imaging results/findings within the past 24 hours.    Carolyn Griggs NP  Palliative Medicine  [facility] Surg

## 2025-02-26 NOTE — SUBJECTIVE & OBJECTIVE
Interval History: patient is still confused DC Tegretol and ativan and narcotics and check Tegretol level.resumed on lower dose.  Consulted ST and adjusted diet.  Urine culture grow Ecoli,on Rocephin.  Much more alert,will do also head CT..  Consulted palliative.daughter interested in NH with Hospice.      Review of Systems   Constitutional:  Positive for activity change and appetite change.   Neurological:  Positive for weakness.   Psychiatric/Behavioral:  Positive for confusion.      Objective:     Vital Signs (Most Recent):  Temp: 97.7 °F (36.5 °C) (02/26/25 0739)  Pulse: 73 (02/26/25 0800)  Resp: 18 (02/26/25 0739)  BP: (!) 153/65 (02/26/25 0739)  SpO2: (!) 94 % (02/26/25 0739) Vital Signs (24h Range):  Temp:  [97.7 °F (36.5 °C)-100 °F (37.8 °C)] 97.7 °F (36.5 °C)  Pulse:  [67-83] 73  Resp:  [18-22] 18  SpO2:  [93 %-98 %] 94 %  BP: ()/(61-80) 153/65     Weight: 67.2 kg (148 lb 2.4 oz)  Body mass index is 28.93 kg/m².    Intake/Output Summary (Last 24 hours) at 2/26/2025 1030  Last data filed at 2/26/2025 0800  Gross per 24 hour   Intake 600 ml   Output 1850 ml   Net -1250 ml         Physical Exam  Constitutional:       Appearance: She is ill-appearing.   Cardiovascular:      Rate and Rhythm: Normal rate.   Pulmonary:      Effort: No respiratory distress.   Abdominal:      General: There is no distension.   Skin:     Coloration: Skin is not jaundiced.             Significant Labs: All pertinent labs within the past 24 hours have been reviewed.  BMP:   Recent Labs   Lab 02/26/25  0442      *   K 4.5      CO2 20*   BUN 33*   CREATININE 1.5*   CALCIUM 7.9*   MG 2.1     CBC:   Recent Labs   Lab 02/25/25  0508 02/26/25  0442   WBC 5.32 6.45   HGB 9.0* 8.4*   HCT 29.4* 26.9*    255     CMP:   Recent Labs   Lab 02/25/25  0508 02/26/25  0442   * 135*   K 4.5 4.5    106   CO2 19* 20*   * 105   BUN 34* 33*   CREATININE 1.4 1.5*   CALCIUM 7.9* 7.9*   ANIONGAP 10 9        Significant Imaging: I have reviewed all pertinent imaging results/findings within the past 24 hours.

## 2025-02-26 NOTE — PLAN OF CARE
Changes in medical condition or discharge plan:    Pt currently in the hospital on quarantine for being covid+. Plan is for pt to return to [place] on Friday, 2/28, with [facility].     Does patient need new DME? No    Follow up appts needed: Pt will follow up with provider at the facility    Medically stable:     Estimated Discharge Date: 2/28 02/26/25 1404   Discharge Reassessment   Assessment Type Discharge Planning Reassessment   Did the patient's condition or plan change since previous assessment? Yes   Discharge Plan discussed with: Adult children   Communicated DEDE with patient/caregiver Yes   Discharge Plan A Return to nursing home  (with hospice)   Discharge Plan B Return to Nursing Home   DME Needed Upon Discharge  none   Transition of Care Barriers None   Why the patient remains in the hospital Requires continued medical care   Post-Acute Status   Coverage Medicare A&B   Discharge Delays None known at this time

## 2025-02-26 NOTE — NURSING
[facility]  Nurses Note -- 4 Eyes      2/26/2025       Skin assessed on: Q Shift      [x] No Pressure Injuries Present    [x]Prevention Measures Documented    [] Yes LDA  for Pressure Injury Previously documented     [] Yes New Pressure Injury Discovered   [] LDA for New Pressure Injury Added      Attending RN:  Charlie Maki RN     Second RN:  MAYELIN Pina

## 2025-02-26 NOTE — SUBJECTIVE & OBJECTIVE
Medications:  Continuous Infusions:  Scheduled Meds:   [START ON 2/27/2025] amLODIPine  10 mg Oral Daily    carBAMazepine  300 mg Oral BID    carvediloL  3.125 mg Oral BID WM    cefTRIAXone (Rocephin) IV (PEDS and ADULTS)  1 g Intravenous Q24H    gabapentin  100 mg Oral TID    mupirocin   Nasal BID    rivaroxaban  10 mg Oral BID WM     PRN Meds:  Current Facility-Administered Medications:     acetaminophen, 650 mg, Oral, Q4H PRN    guaiFENesin 100 mg/5 ml, 200 mg, Oral, Q4H PRN    melatonin, 6 mg, Oral, Nightly PRN    ondansetron, 4 mg, Intravenous, Q8H PRN    sodium chloride 0.9%, 2 mL, Intravenous, Q6H PRN    Objective:     Vital Signs (Most Recent):  Temp: 98.2 °F (36.8 °C) (02/26/25 1220)  Pulse: 63 (02/26/25 1220)  Resp: 18 (02/26/25 1220)  BP: (!) 109/56 (02/26/25 1220)  SpO2: 97 % (02/26/25 1220) Vital Signs (24h Range):  Temp:  [97.7 °F (36.5 °C)-100 °F (37.8 °C)] 98.2 °F (36.8 °C)  Pulse:  [63-83] 63  Resp:  [18-21] 18  SpO2:  [93 %-97 %] 97 %  BP: ()/(56-75) 109/56     Weight: 67.2 kg (148 lb 2.4 oz)  Body mass index is 28.93 kg/m².       Physical Exam  Vitals and nursing note reviewed.   Constitutional:       Appearance: She is ill-appearing.      Comments: Elderly, frail; napping but able to arouse easier    HENT:      Head: Normocephalic and atraumatic.   Pulmonary:      Effort: Pulmonary effort is normal. No respiratory distress.   Neurological:      Mental Status: She is easily aroused. She is disoriented.       Advance Care Planning   Advance Directives:   Living Will: No    LaPOST: No    Medical Power of : No (pt has 6 living children (3 sons, 3 daughters); Jossie is agreeable to update siblings and shares that they typically all agree with pt's care choices)      Decision Making:  Family answered questions and Patient unable to communicate due to disease severity/cognitive impairment  Goals of Care: The family endorses that what is most important right now is to focus on  symptom/pain control, quality of life, even if it means sacrificing a little time, improvement in condition but with limits to invasive therapies, and comfort and QOL     Accordingly, we have decided that the best plan to meet the patient's goals includes continuing with treatment and enrolling in hospice care at time of discharge.      Significant Labs: All pertinent labs within the past 24 hours have been reviewed.    CBC:   Recent Labs   Lab 02/26/25  0442   WBC 6.45   HGB 8.4*   HCT 26.9*   MCV 92        BMP:  Recent Labs   Lab 02/26/25 0442      *   K 4.5      CO2 20*   BUN 33*   CREATININE 1.5*   CALCIUM 7.9*   MG 2.1     LFT:  Lab Results   Component Value Date    AST 17 02/22/2025    ALKPHOS 212 (H) 02/22/2025    BILITOT 0.2 02/22/2025     Albumin:   Albumin   Date Value Ref Range Status   02/22/2025 3.6 3.5 - 5.2 g/dL Final     Protein:   Total Protein   Date Value Ref Range Status   02/22/2025 8.4 6.0 - 8.4 g/dL Final     Lactic acid:   Lab Results   Component Value Date    LACTATE 1.0 12/28/2024    LACTATE 1.0 12/17/2023       Significant Imaging: I have reviewed all pertinent imaging results/findings within the past 24 hours.

## 2025-02-26 NOTE — ASSESSMENT & PLAN NOTE
2025  - interval chart reviewed; discussed pt with HM  - pt unable to participate in GOC/ACP discussion   - call placed to pt's daughter Jossie and met with pt's son Kiran   - family in agreement for DNR and NH with hospice; confirmed with family and HM that DNR order would be placed in chart   - LAPOST to be completed with hospice intake team   - in depth discussion with son on trajectory of dementia, philosophy of hospice care, and pt specific recommendations; HM, Dr. Nichole, also joined discussion to provide medical update and supported recommendation for NH with hospice   - emotional support provided; answered all questions/concerns   - updated MDT     2025  - interval chart reviewed  - able to arouse pt easier today from nap, still not able to appropriately answer questions and lethargic so allowed to rest   - call placed to daughterJossie for GOC/ACP discussion   - learned more about pt outside of hospitalization; pt has been a resident of [facility] since 2024, daughter shared concerns regarding pt's decline and overall care at NH and that she has explored other options, but unfortunately feel issues she has would be faced at alternate facilities as well so she has been communicating concerns to NH   - pt has 6 living children (1 ); Jossie shares that she has been pt's primary care giver and typically arranges pt's care; Jossie is agreeable to updated pt's children and shares that they all usually agree with pt's care decisions; all 6 children collectively share legal surrogate decision making   - pt previously lived with Jossie and did very well (even improved to no longer qualify for hospice in the past under her care), but due to work demands and not being able to afford private sitters NH became necessary   - pt was previously under hospice care with [facility]; reviewed pt's current qualification for hospice, which daughter shared relief to hear as they have  been hoping to include [facility] into pt's care at NH; daughter agreeable to meeting with [facility] and for pt eval   - reviewed current full code status and alternative/recommendation for DNR; reviewed potential interventions, outcomes, and risks; Jossie (daughter) agrees that DNR would be best for pt at this time, but wishes to discuss with pt's other children before formally changing code status   - discussed plan for hospice evaluation and for follow up call to confirm wishes for code status   - emotional support provided; answered all questions   - updated MH and CM     Consult - 2/24/25  - consult received; interval chart reviewed in detail  - visited pt at bedside; attempted to arouse but sleeping, minimally aroused and allowed to rest   - no family at bedside at time of visit; later revisited to assess for family again   - call placed to number on file; will continue attempts to meet with/contact family for GOC/ACP discussion

## 2025-02-26 NOTE — CONSULTS
1823:    Due to isolation regulations a personal visit was not made with the patient. However, I did speak with the patient's Medical Provider and her daughter in law. Medical Provider reports that the patient's son ( who resides out of town) is currently visiting with his Mother. She also disclosed that the patient's prognosis is good.     In exiting the unit, I was advised by the Medical Provider that the patient's daughter in law was waiting in the waiting area. My conversation with the patient's daughter in law revealed that the patient has a daughter that resides locally, and she has the patient's POA. She further explained that she assumes that the patient will return to [facility] upon her discharge from the hospital, but she was not sure. She suggested that her sister in law ( the patient's daughter) would be more equipped to provide accurate information.    Prayers were offered for the patient and her family. The Spiritual Care Team will continue to provide spiritual support to the patient and her family.        TRACE Zaragoza   [phone]

## 2025-02-26 NOTE — PT/OT/SLP PROGRESS
Speech Language Pathology Treatment    Patient Name:  Jossie Morejon   MRN:  [id number]  Admitting Diagnosis: Urinary tract infection without hematuria    Recommendations:                 General Recommendations:   ST f/u for diet tolerance   Diet recommendations:  Soft & Bite Sized Diet - IDDSI Level 6, Liquid Diet Level: Thin liquids - IDDSI Level 0   Aspiration Precautions: 1 bite/sip at a time, Alternating bites/sips, Assistance with meals, HOB to 90 degrees, Meds crushed in puree, and Small bites/sips   General Precautions: Standard, airborne, droplet, pureed diet  Communication strategies:  yes/no questions only and provide increased time to answer    Assessment:     Jossie Morejon is a 89 y.o. female with a dx of Urinary tract infection without hematuria, who presents w/ improved mentation and subsequently improved swallowing fx. ST recs advancing diet to Soft & Bite-sized diet w/ thin liquids. Pt requires 1:1 A for all intake. Meds crushed in puree. No further ST is required at this time, as the pt is consuming the safest and least restrictive diet.     Subjective     Pt's nurse stating pt consumed pureed breakfast tray w/o deficits. Upon ST's entrance, the pt was awake and alert, w/ son at b/s. Pt's son stating pt consumed regular diet w/ thin liquids at home w/o deficits.     Patient goals: per son, advance diet     Pain/Comfort:  Pain Rating 1: 0/10    Respiratory Status: Room air    Objective:     Has the patient been evaluated by SLP for swallowing?   Yes  Keep patient NPO? No   Current Respiratory Status:        The pt consumed the following po items: straw sips of water, as well as bite sized pieces of israel cracker. Pt w/ mild prolonged mastication w/ solid trials 2/2 edentulous status, however, w/ increased time the pt cleared all boluses from the oral cavity. No overt s/s of aspiration across consistencies.     ST educated the pt's son regarding recs to advance diet to Soft & Bite-sized at this time  w/ cont thin liquids. ST ensured pt will get 1:1 A w/ all intake. Pt's son v/u and was agreeable to all recs.     Goals:   Multidisciplinary Problems       SLP Goals          Problem: SLP    Goal Priority Disciplines Outcome   SLP Goal    Low SLP Progressing   Description: ST. Pt will tolerate PO diet of pureed consistency with thin liquids without overt s/s of aspiration.  2. Pt will tolerate PO trials of soft solids with adequate mastication and no overt s/s of aspiration.  3. Pt will tolerate a Soft & Bite-sized diet with thin liquids w/o any overt s/s of aspiration.                                    Plan:     Patient to be seen:   Plan of Care expires:  25  Plan of Care reviewed with:  son   SLP Follow-Up: No     Discharge recommendations:  No Therapy Indicated   Barriers to Discharge:  None    Time Tracking:     SLP Treatment Date:   25  Speech Start Time:  1159  Speech Stop Time:  1214     Speech Total Time (min):  15 min    Billable Minutes: Treatment Swallowing Dysfunction 15    2025

## 2025-02-26 NOTE — ASSESSMENT & PLAN NOTE
- incidental finding on admission; CM/SW in communication with NH to assess affect on return to NH timing    done

## 2025-02-26 NOTE — PLAN OF CARE
Problem: SLP  Goal: SLP Goal  Description: ST. Pt will tolerate PO diet of pureed consistency with thin liquids without overt s/s of aspiration.  2. Pt will tolerate PO trials of soft solids with adequate mastication and no overt s/s of aspiration.  3. Pt will tolerate a Soft & Bite-sized diet with thin liquids w/o any overt s/s of aspiration.     Outcome: Progressing    Pt w/ good tolerance of soft solids po trials. ST recs advancing diet to Soft & Bite-sized diet w/ thin liquids. Meds crushed in pudding. Pt requires 1:1 A w/ all intake. ST to f/u for diet tolerance.

## 2025-02-26 NOTE — PLAN OF CARE
Problem: Infection  Goal: Absence of Infection Signs and Symptoms  Outcome: Progressing  Goal: Absence of Hospital-Acquired Illness or Injury  Outcome: Progressing  Goal: Optimal Comfort and Wellbeing  Outcome: Progressing  Problem: Fall Injury Risk  Goal: Absence of Fall and Fall-Related Injury  Outcome: Progressing

## 2025-02-26 NOTE — NURSING
Received report care assumed. Patient lying in bed resting, NAD noted. Safety precautions maintained.    [facility] [place]  Nurses Note -- 4 Eyes      2/25/2025       Skin assessed on: Q Shift      [x] No Pressure Injuries Present    [x]Prevention Measures Documented    [] Yes LDA  for Pressure Injury Previously documented     [] Yes New Pressure Injury Discovered   [] LDA for New Pressure Injury Added      Attending RN:  Lee Ann Wolfe LPN     Second RN:  Silvana Reyes RN

## 2025-02-27 VITALS
WEIGHT: 148.13 LBS | DIASTOLIC BLOOD PRESSURE: 65 MMHG | BODY MASS INDEX: 29.08 KG/M2 | HEIGHT: 60 IN | HEART RATE: 59 BPM | OXYGEN SATURATION: 97 % | SYSTOLIC BLOOD PRESSURE: 116 MMHG | TEMPERATURE: 98 F | RESPIRATION RATE: 19 BRPM

## 2025-02-27 DIAGNOSIS — U07.1 COVID-19 VIRUS DETECTED: ICD-10-CM

## 2025-02-27 LAB
ANION GAP SERPL CALC-SCNC: 7 MMOL/L (ref 8–16)
BACTERIA BLD CULT: NORMAL
BACTERIA BLD CULT: NORMAL
BASOPHILS # BLD AUTO: 0.01 K/UL (ref 0–0.2)
BASOPHILS NFR BLD: 0.2 % (ref 0–1.9)
BUN SERPL-MCNC: 31 MG/DL (ref 10–30)
CALCIUM SERPL-MCNC: 7.9 MG/DL (ref 8.7–10.5)
CHLORIDE SERPL-SCNC: 108 MMOL/L (ref 95–110)
CO2 SERPL-SCNC: 21 MMOL/L (ref 23–29)
CREAT SERPL-MCNC: 1.4 MG/DL (ref 0.5–1.4)
DIFFERENTIAL METHOD BLD: ABNORMAL
EOSINOPHIL # BLD AUTO: 0.2 K/UL (ref 0–0.5)
EOSINOPHIL NFR BLD: 2.9 % (ref 0–8)
ERYTHROCYTE [DISTWIDTH] IN BLOOD BY AUTOMATED COUNT: 14.6 % (ref 11.5–14.5)
EST. GFR  (NO RACE VARIABLE): 35 ML/MIN/1.73 M^2
GLUCOSE SERPL-MCNC: 93 MG/DL (ref 70–110)
HCT VFR BLD AUTO: 25.1 % (ref 37–48.5)
HGB BLD-MCNC: 8 G/DL (ref 12–16)
IMM GRANULOCYTES # BLD AUTO: 0.07 K/UL (ref 0–0.04)
IMM GRANULOCYTES NFR BLD AUTO: 1.2 % (ref 0–0.5)
LYMPHOCYTES # BLD AUTO: 2.2 K/UL (ref 1–4.8)
LYMPHOCYTES NFR BLD: 36.1 % (ref 18–48)
MAGNESIUM SERPL-MCNC: 2.3 MG/DL (ref 1.6–2.6)
MCH RBC QN AUTO: 28.9 PG (ref 27–31)
MCHC RBC AUTO-ENTMCNC: 31.9 G/DL (ref 32–36)
MCV RBC AUTO: 91 FL (ref 82–98)
MONOCYTES # BLD AUTO: 0.8 K/UL (ref 0.3–1)
MONOCYTES NFR BLD: 12.6 % (ref 4–15)
NEUTROPHILS # BLD AUTO: 2.8 K/UL (ref 1.8–7.7)
NEUTROPHILS NFR BLD: 47 % (ref 38–73)
NRBC BLD-RTO: 0 /100 WBC
PLATELET # BLD AUTO: 270 K/UL (ref 150–450)
PMV BLD AUTO: 10.3 FL (ref 9.2–12.9)
POTASSIUM SERPL-SCNC: 4.6 MMOL/L (ref 3.5–5.1)
RBC # BLD AUTO: 2.77 M/UL (ref 4–5.4)
SODIUM SERPL-SCNC: 136 MMOL/L (ref 136–145)
WBC # BLD AUTO: 5.95 K/UL (ref 3.9–12.7)

## 2025-02-27 PROCEDURE — 36415 COLL VENOUS BLD VENIPUNCTURE: CPT | Performed by: STUDENT IN AN ORGANIZED HEALTH CARE EDUCATION/TRAINING PROGRAM

## 2025-02-27 PROCEDURE — 80048 BASIC METABOLIC PNL TOTAL CA: CPT | Performed by: STUDENT IN AN ORGANIZED HEALTH CARE EDUCATION/TRAINING PROGRAM

## 2025-02-27 PROCEDURE — 25000003 PHARM REV CODE 250: Performed by: STUDENT IN AN ORGANIZED HEALTH CARE EDUCATION/TRAINING PROGRAM

## 2025-02-27 PROCEDURE — 85025 COMPLETE CBC W/AUTO DIFF WBC: CPT | Performed by: STUDENT IN AN ORGANIZED HEALTH CARE EDUCATION/TRAINING PROGRAM

## 2025-02-27 PROCEDURE — 25000003 PHARM REV CODE 250: Performed by: HOSPITALIST

## 2025-02-27 PROCEDURE — 83735 ASSAY OF MAGNESIUM: CPT | Performed by: STUDENT IN AN ORGANIZED HEALTH CARE EDUCATION/TRAINING PROGRAM

## 2025-02-27 PROCEDURE — 63600175 PHARM REV CODE 636 W HCPCS: Performed by: HOSPITALIST

## 2025-02-27 RX ORDER — ACETAMINOPHEN 325 MG/1
650 TABLET ORAL EVERY 4 HOURS PRN
Start: 2025-02-27

## 2025-02-27 RX ORDER — GABAPENTIN 100 MG/1
100 CAPSULE ORAL 3 TIMES DAILY
Start: 2025-02-27 | End: 2026-02-27

## 2025-02-27 RX ORDER — CEFTRIAXONE 1 G/1
1 INJECTION, POWDER, FOR SOLUTION INTRAMUSCULAR; INTRAVENOUS
Status: DISCONTINUED | OUTPATIENT
Start: 2025-02-27 | End: 2025-02-27 | Stop reason: HOSPADM

## 2025-02-27 RX ORDER — CARBAMAZEPINE 100 MG/1
300 TABLET, CHEWABLE ORAL 2 TIMES DAILY
Start: 2025-02-27 | End: 2026-02-27

## 2025-02-27 RX ADMIN — MUPIROCIN: 20 OINTMENT TOPICAL at 10:02

## 2025-02-27 RX ADMIN — CARBAMAZEPINE 300 MG: 100 TABLET, CHEWABLE ORAL at 09:02

## 2025-02-27 RX ADMIN — CARVEDILOL 3.12 MG: 3.12 TABLET, FILM COATED ORAL at 08:02

## 2025-02-27 RX ADMIN — RIVAROXABAN 10 MG: 10 TABLET, FILM COATED ORAL at 08:02

## 2025-02-27 RX ADMIN — CEFTRIAXONE 1 G: 1 INJECTION, POWDER, FOR SOLUTION INTRAMUSCULAR; INTRAVENOUS at 10:02

## 2025-02-27 RX ADMIN — CEFTRIAXONE 1 G: 1 INJECTION, POWDER, FOR SOLUTION INTRAMUSCULAR; INTRAVENOUS at 09:02

## 2025-02-27 RX ADMIN — GABAPENTIN 100 MG: 100 CAPSULE ORAL at 09:02

## 2025-02-27 NOTE — PLAN OF CARE
SONJA sent plan of care orders to [place]. [facility] meeting with family this morning to sign consents.

## 2025-02-27 NOTE — DISCHARGE SUMMARY
[facility]  Discharge Summary      Patient Name: Jossie Morejon  MRN: [id number]  NAIDA: [id number]  Patient Class: IP- Inpatient  Admission Date: 2/22/2025  Hospital Length of Stay: 5 days  Discharge Date and Time:  02/27/2025 10:52 AM  Attending Physician: Alondra Mckeon, *   Discharging Provider: Alondra Mckeon MD  Primary Care Provider: Imtiaz Frye MD    Primary Care Team: Networked reference to record PCT     HPI:   Jossie Morejon is a 89 y.o. female who resides at [facility], seizure,Alzheimer disease, A.fib, Hx of breast cancer, CKD, Type 2 DM ( diet controlled), HLD, HTN, Hx of CVA, who resides in a nursing home since last 6 months presenting for evalution of lethargy.    Recent admission 12/30 for a UTI presents with altered mental status.  Daughter was concerned because she was more lethargic than usual.   Based on a note at  baseline she  he is oriented to self and will recognize some family members.  Here today she is awake but will not make eye contact, does appear lethargic.     In the ED patient noted to have     Initial Vitals [02/22/25 1802]  BP Pulse Resp Temp SpO2  (!) 167/73 81 16 98.5 °F (36.9 °C) (!) 94 %       Labs show - CHRISTO, Cr at baseline is 1.3 ( currently 1.7) elevated AlkP 212 eGFR of 28, BNP 69, Trops 0.016, stable chronic anemia, increased immature cells and neutrophils. UA notable for bacteria, >100 WBCs and clumps c/f UTI, urine cultures sent.   COVID positive. CT head negative.  In the ED, she later developed a fever of 100.7 while here. Given 2 g of Rocephin. Admit to Hospital medicine for UTI and COVID monitoring    * No surgery found *      Hospital Course:   Jossie Morejon is a 89 y.o. female who resides at [facility], seizure,Alzheimer disease, A.fib, Hx of breast cancer, CKD, Type 2 DM ( diet controlled), HLD, HTN, Hx of CVA, who resides in a nursing home since last 6 months presenting for evalution of  lethargy.Labs show - CHRISTO, Cr at baseline is 1.3 ( currently 1.7) elevated AlkP 212 eGFR of 28, BNP 69, Trops 0.016, stable chronic anemia, increased immature cells and neutrophils. UA notable for bacteria, >100 WBCs and clumps c/f UTI, urine cultures sent.   COVID positive. CT head negative.  In the ED, she later developed a fever of 100.7 while here. Given 2 g of Rocephin. Admit to Hospital medicine for UTI and COVID monitoring.  patient was still confused DC Tegretol and ativan and narcotics and check Tegretol level.resumed on lower Tegretol level.  Consulted ST and adjusted diet.  CHRISTO is resolved  on gentle IVF.  Urine culture grow Ecoli,on Rocephin.  Much more alert ,will do also head CT..show no acute process,mental status was back to baseline.  Consulted palliative.daughter and  interested in NH with Hospice.DNR status was placed.  With family agreement patient was discharged NH with Hospice.       Goals of Care Treatment Preferences:  Code Status: DNR          What is most important right now is to focus on symptom/pain control, quality of life, even if it means sacrificing a little time, improvement in condition but with limits to invasive therapies, comfort and QOL .  Accordingly, we have decided that the best plan to meet the patient's goals includes continuing with treatment.      SDOH Screening:  The patient was unable to be screened for utility difficulties, food insecurity, transport difficulties, housing insecurity, and interpersonal safety, so no concerns could be identified this admission.     Consults:   Consults (From admission, onward)          Status Ordering Provider     Inpatient consult to Palliative Care  Once        Provider:  Carolyn Griggs NP    Completed MENDOZA LANDIN     Inpatient consult to Spiritual Care  Once        Provider:  (Not yet assigned)    Completed MENDOZA LANDIN            * Urinary tract infection without hematuria  Patient with altered mental  status and UA suggestive of infection. She is unable to communicate regarding symptoms therefore will treat with 3 days of ceftriaxone.  Given multiple prior Uas with similar findings patient likely has a colonization   Monitor fever curve and leucocytosis.      Urine culture growing. GNR,on Rocephin.    ACP (advance care planning)    Consulted palliative.daughter interested in NH with Hospice.  Spent over 5 minutes.      Acute renal failure superimposed on stage 3 chronic kidney disease  CHRISTO is likely due to pre-renal azotemia due to dehydration. Baseline creatinine is  1.4 . Most recent creatinine and eGFR are listed below.  Recent Labs     02/22/25  1837 02/23/25  0416   CREATININE 1.7* 1.4   EGFRNORACEVR 28* 35*      Plan  - CHRISTO is improving  - Avoid nephrotoxins and renally dose meds for GFR listed above  - Monitor urine output, serial BMP, and adjust therapy as needed  -     Acute metabolic encephalopathy    patient is still confused DC Tegretol and ativan and narcotics and check Tegretol level.resumed lower diose.  Consulted ST and adjusted diet.  Much more alert ,check head CT      A-fib  Patient has paroxysmal (<7 days) atrial fibrillation. Patient is currently in sinus rhythm. [id number] Score: 4. The patients heart rate in the last 24 hours is as follows:  Pulse  Min: 81  Max: 88     Antiarrhythmics   None    Anticoagulants  , 2 times daily with meals, Oral  rivaroxaban tablet 10 mg, 2 times daily with meals, Oral    Plan  - Replete lytes with a goal of K>4, Mg >2  - Patient is anticoagulated, see medications listed above.  - Patient's afib is currently controlled        Disorientation  Patient has poor baseline mental status but reportedly off her usual self since this AM. Likely contributed metabolic derangements from UTI may be contributing  CT head with no acute structural changes  Plan:  - Treatment for UTI with ctx  - Additiona Delirium precautions: avoid offending meds (anticholinergic,  antihistamine, benzo, anti-dopamine), promote sleep-wake cycles.  Improving.      COVID-19 virus detected  Patient noted to be positive for COVID but does not have any signs of respiratory distress. On room air with starts ~97-98%  - Will give remdesavir due to patient's age and risk of severe infection    Seizure disorder  No witnessed seizure,     DC Tegretol and ativan and narcotics and check Tegretol level.  Resumed on lower dose.        HTN, goal below 140/90  Patient's blood pressure range in the last 24 hours was: BP  Min: 160/74  Max: 180/83.The patient's inpatient anti-hypertensive regimen is listed below:  Current Antihypertensives  carvediloL tablet 3.125 mg, 2 times daily with meals, Oral    Plan  - BP is uncontrolled, will adjust as follows: restart home meds as tolerated      Chronic kidney disease, stage III (moderate)  Creatine stable for now. BMP reviewed- noted Estimated Creatinine Clearance: 17.8 mL/min (A) (based on SCr of 1.7 mg/dL (H)). according to latest data. 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  Monitor UOP and serial BMP and adjust therapy as needed. Renally dose meds. Avoid nephrotoxic medications and procedures.    Diabetes mellitus with renal manifestations, controlled  Patient's FSGs are controlled on current medication regimen.  Last A1c reviewed-   Lab Results   Component Value Date    HGBA1C 5.7 (H) 12/29/2024   - Hold Oral hypoglycemics while patient is in the hospital.  - Monitor BMP daily for glucose levels      Final Active Diagnoses:    Diagnosis Date Noted POA    PRINCIPAL PROBLEM:  Urinary tract infection without hematuria [N39.0] 12/28/2024 Yes    ACP (advance care planning) [Z71.89] 02/24/2025 Not Applicable    Acute metabolic encephalopathy [G93.41] 02/23/2025 Yes    Acute renal failure superimposed on stage 3 chronic kidney disease [N17.9, N18.30] 02/23/2025 Yes    A-fib [I48.91] 02/22/2025 Yes    Disorientation [R41.0] 12/28/2024 Yes    COVID-19 virus  detected [U07.1] 08/06/2023 Yes    Seizure disorder [G40.909] 01/17/2019 Yes    HTN, goal below 140/90 [I10] 03/19/2018 Yes    Late onset Alzheimer's disease without behavioral disturbance [G30.1, F02.80] 04/26/2017 Yes    Chronic kidney disease, stage III (moderate) [N18.30] 02/19/2015 Yes    Diabetes mellitus with renal manifestations, controlled [E11.29] 04/15/2013 Yes      Problems Resolved During this Admission:       Discharged Condition: stable    Disposition: Planned Readmission - Nu*    Follow Up:   Follow-up Information       Imtiaz Frye MD Follow up in 1 week(s).    Specialties: General Practice, Internal Medicine  Contact information:  [address] [place] [address]   [id number]  [place] [address] [phone]                           Patient Instructions:      Activity as tolerated       Significant Diagnostic Studies: Labs: BMP:   Recent Labs   Lab 02/26/25  0442 02/27/25  0511    93   * 136   K 4.5 4.6    108   CO2 20* 21*   BUN 33* 31*   CREATININE 1.5* 1.4   CALCIUM 7.9* 7.9*   MG 2.1 2.3   , CMP   Recent Labs   Lab 02/26/25  0442 02/27/25  0511   * 136   K 4.5 4.6    108   CO2 20* 21*    93   BUN 33* 31*   CREATININE 1.5* 1.4   CALCIUM 7.9* 7.9*   ANIONGAP 9 7*   , and CBC   Recent Labs   Lab 02/26/25  0442 02/27/25  0511   WBC 6.45 5.95   HGB 8.4* 8.0*   HCT 26.9* 25.1*    270     Microbiology: Blood Culture   Lab Results   Component Value Date    LABBLOO No Growth after 4 days. 02/22/2025    LABBLOO No Growth after 4 days. 02/22/2025    and Urine Culture    Lab Results   Component Value Date    LABURIN ESCHERICHIA COLI  >100,000 cfu/ml   (A) 02/22/2025     Radiology: X-Ray: CXR: X-Ray Chest 1 View (CXR):   Results for orders placed or performed during the hospital encounter of 02/22/25   X-Ray Chest 1 View    Narrative    EXAMINATION:  XR CHEST 1 VIEW    CLINICAL HISTORY:  Hypoxia;    TECHNIQUE:  Single frontal view of the chest was  performed.    COMPARISON:  02/22/2025    FINDINGS:  The cardiomediastinal silhouette is not enlarged noting calcification of the aorta and probable hiatal hernia..  There is no pleural effusion.  The trachea is midline.  The lungs are symmetrically expanded bilaterally with coarse interstitial attenuation.  There is left basilar subsegmental atelectasis or scarring..  No large focal consolidation seen.  There is no pneumothorax.  The osseous structures are remarkable for degenerative change..      Impression    1. Interstitial findings may reflect edema noting shallow inspiratory effort.  There is left basilar subsegmental atelectasis.  No convincing large focal consolidation at this time.  Follow-up advised.      Electronically signed by: Jg Ordonez MD  Date:    02/25/2025  Time:    18:29    and X-Ray Chest PA and Lateral (CXR): No results found for this visit on 02/22/25.    Pending Diagnostic Studies:       None           Medications:  Reconciled Home Medications:      Medication List        START taking these medications      acetaminophen 325 MG tablet  Commonly known as: TYLENOL  Take 2 tablets (650 mg total) by mouth every 4 (four) hours as needed.     carBAMazepine 100 mg chewable tablet  Commonly known as: TEGRETOL  Take 3 tablets (300 mg total) by mouth 2 (two) times a day.  Replaces: carBAMazepine 400 MG Tb12     pulse oximeter device  Commonly known as: pulse oximeter  by Apply Externally route 2 (two) times a day. Use twice daily at 8 AM and 3 PM and record the value in MyChart as directed.            CHANGE how you take these medications      gabapentin 100 MG capsule  Commonly known as: NEURONTIN  Take 1 capsule (100 mg total) by mouth 3 (three) times daily.  What changed:   medication strength  how much to take            CONTINUE taking these medications      carvediloL 3.125 MG tablet  Commonly known as: COREG  Take 3.125 mg by mouth 2 (two) times daily with meals.            STOP taking these  medications      carBAMazepine 400 MG Tb12  Commonly known as: TEGRETOL XR  Replaced by: carBAMazepine 100 mg chewable tablet     LORazepam 0.5 MG tablet  Commonly known as: ATIVAN     XARELTO 20 mg Tab  Generic drug: rivaroxaban              Indwelling Lines/Drains at time of discharge:   Lines/Drains/Airways       Drain  Duration             Female External Urinary Catheter w/ Suction 02/23/25 4 days                    Time spent on the discharge of patient:  over 30  minutes         Alondra Mckeon MD  Department of Hospital Medicine  [facility]

## 2025-02-27 NOTE — PLAN OF CARE
Patient sitting up in bed, turned as tolerated, bilat feet in heel boots, resp even and unlabored, non productive cough noted, family at bedside, bed locked and in low position, call light within reach.     Problem: Fall Injury Risk  Goal: Absence of Fall and Fall-Related Injury  Outcome: Progressing     Problem: Skin Injury Risk Increased  Goal: Skin Health and Integrity  Outcome: Progressing  Intervention: Optimize Skin Protection  Flowsheets (Taken 2/26/2025 9092)  Pressure Reduction Techniques:   frequent weight shift encouraged   heels elevated off bed   weight shift assistance provided  Pressure Reduction Devices: positioning supports utilized  Skin Protection: incontinence pads utilized  Activity Management: Rolling - L1  Head of Bed (HOB) Positioning: HOB elevated

## 2025-02-27 NOTE — PLAN OF CARE
Case Management Final Discharge Note    Call report to [phone], ask for the 100 mckenzie nurse. Pt going to room 106A.  Informed MAYELIN Pina of call report information.    CM informed pt's daughter Jossie of dc today and requested transportation time.    Discharge Disposition: Return to [facility]. Pt's family signed consents with [facility].    New DME ordered / company name: None    Relevant SDOH / Transition of Care Barriers:  None identified    Person available to provide assistance at home when needed and their contact information: Jossie Ornelas    Scheduled followup appointment: Pt will follow up with provider at the nursing home/hospice provider    Referrals placed: None    Transportation: ADT 30 order placed for [employer].  Requested  time: 1530  If transportation does not arrive at ETA time nurse will be instructed to follow protocol for transportation below:  How can I get in touch directly with dispatch, if needed?                 Non-emergent dispatch: [phone] opt 6    +++NURSING:  If Van does not arrive at requested time please call the above Non Emergent Dispatcher.  If issue not resolved please escalate to your charge nurse for further instructions.    Transport to [facility] [id number] [place] [address] JAKE Deleon [id number]          Patient and family educated on discharge services and updated on DC plan. Bedside RN notified, patient clear to discharge from Case Management Perspective.      02/27/25 1329   Final Note   Assessment Type Final Discharge Note   Anticipated Discharge Disposition [facility]  (with hospice)   Post-Acute Status   Coverage Medicare A&B   Discharge Delays None known at this time

## 2025-02-27 NOTE — PLAN OF CARE
Problem: Infection  Goal: Absence of Infection Signs and Symptoms  Outcome: Met     Outcome: Met  Goal: Absence of Hospital-Acquired Illness or Injury  Outcome: Met  Goal: Optimal Comfort and Wellbeing  Outcome: Met  Goal: Readiness for Transition of Care  Outcome: Met     Problem: Diabetes Comorbidity  Goal: Blood Glucose Level Within Targeted Range  Outcome: Met     Problem: Fall Injury Risk  Goal: Absence of Fall and Fall-Related Injury  Outcome: Met     Problem: Skin Injury Risk Increased  Goal: Skin Health and Integrity  Outcome: Met     Problem: Acute Kidney Injury/Impairment  Goal: Fluid and Electrolyte Balance  Outcome: Met  Goal: Improved Oral Intake  Outcome: Met  Goal: Effective Renal Function  Outcome: Met     Problem: Coping Ineffective  Goal: Effective Coping  Outcome: Met

## 2025-02-27 NOTE — NURSING
Received report care assumed. Patient lying in bed resting, NAD noted. Safety precautions maintained. Family at the bedside.     [facility], [facility]  Nurses Note -- 4 Eyes      2/26/2025       Skin assessed on: Q Shift      [] No Pressure Injuries Present    []Prevention Measures Documented    [] Yes LDA  for Pressure Injury Previously documented     [] Yes New Pressure Injury Discovered   [] LDA for New Pressure Injury Added      Attending RN:  Lee Ann Wolfe LPN     Second RN:  MAYELIN Pina

## 2025-02-27 NOTE — PLAN OF CARE
[facility]     Department of Hospital Medicine     [address] [place] [address]     [phone] after hours  [phone] fax       NURSING HOME ORDERS    02/27/2025    Admit to Nursing Home:  Regular Bed    with Hospice                                               Diagnoses:  Active Hospital Problems    Diagnosis  POA    *Urinary tract infection without hematuria [N39.0]  Yes             ACP (advance care planning) [Z71.89]  Not Applicable    Acute metabolic encephalopathy [G93.41]  Yes    Acute renal failure superimposed on stage 3 chronic kidney disease [N17.9, N18.30]  Yes    A-fib [I48.91]  Yes    Disorientation [R41.0]  Yes    COVID-19 virus detected [U07.1]  Yes    Seizure disorder [G40.909]  Yes    HTN, goal below 140/90 [I10]  Yes    Late onset Alzheimer's disease without behavioral disturbance [G30.1, F02.80]  Yes    Chronic kidney disease, stage III (moderate) [N18.30]  Yes    Diabetes mellitus with renal manifestations, controlled [E11.29]  Yes      Resolved Hospital Problems   No resolved problems to display.       Patient is homebound due to:  Urinary tract infection without hematuria    Allergies:Review of patient's allergies indicates:  No Known Allergies    Vitals:       Every shift (Skilled Nursing patients)    Diet: mechanical soft diet,aspiration precaution,fall precaution     Acitivities:     - Up in a chair each morning as tolerated     - May use walker, cane, or self-propelled wheelchair   - Weight bearing: bed rest     LABS:  Per facility protocol    Nursing Precautions:     - Aspiration precautions:             - Total assistance with meals            -  Upright 90 degrees befor during and after meals                  - Fall precautions per nursing home protocol   - Seizure precaution per residential protocol   - Decubitus precautions:        -  for positioning   - Pressure reducing foam mattress   - Turn patient every two  hours. Use wedge pillows to anchor patient    CONSULTS:      Hospice     MISCELLANEOUS CARE:       Routine Skin for Bedridden Patients:  Apply moisture barrier cream to all    skin folds and wet areas in perineal area daily and after baths and                           all bowel movements.               Medications: Discontinue all previous medication orders, if any. See new list below.        Medication List        START taking these medications      acetaminophen 325 MG tablet  Commonly known as: TYLENOL  Take 2 tablets (650 mg total) by mouth every 4 (four) hours as needed.     carBAMazepine 100 mg chewable tablet  Commonly known as: TEGRETOL  Take 3 tablets (300 mg total) by mouth 2 (two) times a day.  Replaces: carBAMazepine 400 MG Tb12     pulse oximeter device  Commonly known as: pulse oximeter  by Apply Externally route 2 (two) times a day. Use twice daily at 8 AM and 3 PM and record the value in S[employer] as directed.            CHANGE how you take these medications      gabapentin 100 MG capsule  Commonly known as: NEURONTIN  Take 1 capsule (100 mg total) by mouth 3 (three) times daily.  What changed:   medication strength  how much to take            CONTINUE taking these medications      carvediloL 3.125 MG tablet  Commonly known as: COREG  Take 3.125 mg by mouth 2 (two) times daily with meals.            STOP taking these medications      carBAMazepine 400 MG Tb12  Commonly known as: TEGRETOL XR  Replaced by: carBAMazepine 100 mg chewable tablet     LORazepam 0.5 MG tablet  Commonly known as: ATIVAN     XARELTO 20 mg Tab  Generic drug: rivaroxaban                    _________________________________  Alondra Mckeon MD  02/27/2025

## 2025-02-27 NOTE — PLAN OF CARE
Patient remain with Fall risk monitoring at the bedside. No acute distress noted, patient free from falls or injury this shift.  Bed in low position, wheels locked, call light in reach for assistance, plan of care continued.      Problem: Fall Injury Risk  Goal: Absence of Fall and Fall-Related Injury  Outcome: Progressing     Problem: Skin Injury Risk Increased  Goal: Skin Health and Integrity  Outcome: Progressing

## 2025-02-27 NOTE — NURSING
[facility], [facility]  Nurses Note -- 4 Eyes      2/27/2025       Skin assessed on: Q Shift      [x] No Pressure Injuries Present    [x]Prevention Measures Documented    [] Yes LDA  for Pressure Injury Previously documented     [] Yes New Pressure Injury Discovered   [] LDA for New Pressure Injury Added      Attending RN:  Charlie Maki RN     Second RN:  MAYELIN Pina

## 2025-02-27 NOTE — NURSING
Pt discharged per MD order. IV removed. Catheter tip intact. No distress noted. Discharge instructions reviewed with pt and family. Given the opportunity for questions. All questions answered. Vitals per chart. Afebrile. No complaints of pain, N/V, diarrhea, or SOB upon leaving the unit via stretcher.

## 2025-06-08 ENCOUNTER — HOSPITAL ENCOUNTER (EMERGENCY)
Facility: HOSPITAL | Age: OVER 89
Discharge: HOME | End: 2025-06-09
Attending: STUDENT IN AN ORGANIZED HEALTH CARE EDUCATION/TRAINING PROGRAM
Payer: MEDICARE
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
## 2025-06-08 LAB
ABSOLUTE EOSINOPHIL (OHS): 0.21 K/UL
ABSOLUTE MONOCYTE (OHS): 0.7 K/UL (ref 0.3–1)
ABSOLUTE NEUTROPHIL COUNT (OHS): 4.29 K/UL (ref 1.8–7.7)
ALBUMIN SERPL BCP-MCNC: 3.7 G/DL (ref 3.5–5.2)
ALP SERPL-CCNC: 209 UNIT/L (ref 40–150)
ALT SERPL W/O P-5'-P-CCNC: 22 UNIT/L (ref 10–44)
ANION GAP (OHS): 12 MMOL/L (ref 8–16)
APTT PPP: 23.9 SECONDS (ref 21–32)
AST SERPL-CCNC: 21 UNIT/L (ref 11–45)
BASOPHILS # BLD AUTO: 0.02 K/UL
BASOPHILS NFR BLD AUTO: 0.3 %
BILIRUB SERPL-MCNC: 0.1 MG/DL (ref 0.1–1)
BUN SERPL-MCNC: 26 MG/DL (ref 10–30)
CALCIUM SERPL-MCNC: 8.9 MG/DL (ref 8.7–10.5)
CHLORIDE SERPL-SCNC: 109 MMOL/L (ref 95–110)
CO2 SERPL-SCNC: 21 MMOL/L (ref 23–29)
CREAT SERPL-MCNC: 1.4 MG/DL (ref 0.5–1.4)
ERYTHROCYTE [DISTWIDTH] IN BLOOD BY AUTOMATED COUNT: 18.2 % (ref 11.5–14.5)
GFR SERPLBLD CREATININE-BSD FMLA CKD-EPI: 35 ML/MIN/1.73/M2
GLUCOSE SERPL-MCNC: 113 MG/DL (ref 70–110)
HCT VFR BLD AUTO: 30.7 % (ref 37–48.5)
HGB BLD-MCNC: 9.3 GM/DL (ref 12–16)
IMM GRANULOCYTES # BLD AUTO: 0.04 K/UL (ref 0–0.04)
IMM GRANULOCYTES NFR BLD AUTO: 0.6 % (ref 0–0.5)
INR PPP: 0.9 (ref 0.8–1.2)
LYMPHOCYTES # BLD AUTO: 1.6 K/UL (ref 1–4.8)
MCH RBC QN AUTO: 25 PG (ref 27–31)
MCHC RBC AUTO-ENTMCNC: 30.3 G/DL (ref 32–36)
MCV RBC AUTO: 83 FL (ref 82–98)
NUCLEATED RBC (/100WBC) (OHS): 0 /100 WBC
PLATELET # BLD AUTO: 270 K/UL (ref 150–450)
PMV BLD AUTO: 9.5 FL (ref 9.2–12.9)
POTASSIUM SERPL-SCNC: 4.7 MMOL/L (ref 3.5–5.1)
PROT SERPL-MCNC: 8.1 GM/DL (ref 6–8.4)
PROTHROMBIN TIME: 10.5 SECONDS (ref 9–12.5)
RBC # BLD AUTO: 3.72 M/UL (ref 4–5.4)
RELATIVE EOSINOPHIL (OHS): 3.1 %
RELATIVE LYMPHOCYTE (OHS): 23.3 % (ref 18–48)
RELATIVE MONOCYTE (OHS): 10.2 % (ref 4–15)
RELATIVE NEUTROPHIL (OHS): 62.5 % (ref 38–73)
SODIUM SERPL-SCNC: 142 MMOL/L (ref 136–145)
WBC # BLD AUTO: 6.86 K/UL (ref 3.9–12.7)

## 2025-06-08 PROCEDURE — 93010 ELECTROCARDIOGRAM REPORT: CPT | Mod: GW,,, | Performed by: INTERNAL MEDICINE

## 2025-06-08 PROCEDURE — 93005 ELECTROCARDIOGRAM TRACING: CPT

## 2025-06-08 PROCEDURE — 81003 URINALYSIS AUTO W/O SCOPE: CPT | Performed by: STUDENT IN AN ORGANIZED HEALTH CARE EDUCATION/TRAINING PROGRAM

## 2025-06-08 PROCEDURE — 85610 PROTHROMBIN TIME: CPT | Performed by: STUDENT IN AN ORGANIZED HEALTH CARE EDUCATION/TRAINING PROGRAM

## 2025-06-08 PROCEDURE — 99285 EMERGENCY DEPT VISIT HI MDM: CPT | Mod: 25

## 2025-06-08 PROCEDURE — 80053 COMPREHEN METABOLIC PANEL: CPT | Performed by: STUDENT IN AN ORGANIZED HEALTH CARE EDUCATION/TRAINING PROGRAM

## 2025-06-08 PROCEDURE — 85025 COMPLETE CBC W/AUTO DIFF WBC: CPT | Performed by: STUDENT IN AN ORGANIZED HEALTH CARE EDUCATION/TRAINING PROGRAM

## 2025-06-08 PROCEDURE — 25000003 PHARM REV CODE 250: Performed by: STUDENT IN AN ORGANIZED HEALTH CARE EDUCATION/TRAINING PROGRAM

## 2025-06-08 PROCEDURE — 85730 THROMBOPLASTIN TIME PARTIAL: CPT | Performed by: STUDENT IN AN ORGANIZED HEALTH CARE EDUCATION/TRAINING PROGRAM

## 2025-06-08 RX ORDER — QUETIAPINE FUMARATE 25 MG/1
25 TABLET, FILM COATED ORAL ONCE
Status: COMPLETED | OUTPATIENT
Start: 2025-06-08 | End: 2025-06-08

## 2025-06-08 RX ADMIN — QUETIAPINE FUMARATE 25 MG: 25 TABLET ORAL at 10:06

## 2025-06-09 VITALS
DIASTOLIC BLOOD PRESSURE: 86 MMHG | HEIGHT: 60 IN | HEART RATE: 76 BPM | SYSTOLIC BLOOD PRESSURE: 180 MMHG | OXYGEN SATURATION: 94 % | RESPIRATION RATE: 22 BRPM | BODY MASS INDEX: 30.43 KG/M2 | TEMPERATURE: 98 F | WEIGHT: 155 LBS

## 2025-06-09 LAB
BILIRUB UR QL STRIP.AUTO: NEGATIVE
CLARITY UR: CLEAR
COLOR UR AUTO: COLORLESS
GLUCOSE UR QL STRIP: NEGATIVE
HGB UR QL STRIP: NEGATIVE
HOLD SPECIMEN: NORMAL
KETONES UR QL STRIP: NEGATIVE
LEUKOCYTE ESTERASE UR QL STRIP: NEGATIVE
NITRITE UR QL STRIP: NEGATIVE
PH UR STRIP: 6 [PH]
PROT UR QL STRIP: ABNORMAL
SP GR UR STRIP: 1.01
UROBILINOGEN UR STRIP-ACNC: NEGATIVE EU/DL

## 2025-06-09 RX ORDER — TALC
3 POWDER (GRAM) TOPICAL NIGHTLY
Qty: 30 TABLET | Refills: 1 | Status: SHIPPED | OUTPATIENT
Start: 2025-06-09 | End: 2025-08-08

## 2025-06-09 NOTE — DISCHARGE INSTRUCTIONS
Start melatonin for sleep at night.  If this does not improve her sleep difficulties and mild agitation you should contact her regular provider for further management.  Return if she develops worsening altered mental status, nausea, vomiting, complaints of headaches, seizure activity, weakness in 1 part of her body.  Thank you.

## 2025-06-09 NOTE — ED PROVIDER NOTES
"Encounter Date: 6/8/2025    SCRIBE #1 NOTE: I, Hansa Rahman, am scribing for, and in the presence of,  Grupo Campuzano MD. I have scribed the following portions of the note - Other sections scribed: HPI, ROS, Physical Exam.       History     Chief Complaint   Patient presents with    Altered Mental Status     BIB [facility] 08 from [facility] for AMS, per EMS the staff reports the pt has been becoming increasingly confused at the facility being resistant to staff. Pt currently calm and cooperative  in triage.      Jossie Morejon is a 89 y.o. female, with a PMHx of alzheimers, breast cancer, CKD, anemia, anxiety, HTN, DM, who presents to the ED via EMS from [facility] following a fall. Independent historian, EMS personnel, note that she has been at baseline cognition but more aggravated, angry, and anxious than usual. She has been refusing medications, not sleeping, and afraid people are trying to hurt her. Patient notes she was trying to get out of bed when she fell and hit the back of her head "hard".  Staff wanted her sent here for a trauma evaluation.  Denies LOC. She endorses pain upon impact but denies any current headache. Endorses recurrent minor falls.  No other exacerbating or alleviating factors. Denies cough or other associated symptoms.  Patient is currently taking morphine, ativan PRN, Coreg, and carBAMazepine.  Per staff at the facility as well as family at bedside the patient is at her baseline mental status.    Per chart review, last CT Head 02/26/2025, Impression: Allowing for extensive motion artifact, no convincing acute intracranial abnormalities noting sequela of chronic microvascular ischemic change and senescent change. Sinus disease. Last Chest X-Ray 02/25/2025, Interstitial findings may reflect edema noting shallow inspiratory effort.  There is left basilar subsegmental atelectasis.  No convincing large focal consolidation at this time.  Follow-up advised.              The " history is provided by the patient, the EMS personnel and medical records. No  was used.     Review of patient's allergies indicates:  No Known Allergies  Past Medical History:   Diagnosis Date    Alzheimer disease     Breast cancer     CKD (chronic kidney disease)     Diabetes mellitus type II     Dyslipidemia     Dyslipidemia     Essential hypertension, benign     History of CVA (cerebrovascular accident)     Hypercholesteremia     Microalbuminuria     Mild anemia     Seizure disorder      No past surgical history on file.  Family History   Problem Relation Name Age of Onset    Diabetes Mother       Social History[1]  Review of Systems   Constitutional:  Positive for fever. Negative for chills.   HENT:  Negative for congestion and sore throat.    Eyes:  Negative for pain.   Respiratory:  Negative for cough, shortness of breath and wheezing.    Cardiovascular:  Negative for chest pain.   Gastrointestinal:  Negative for abdominal pain.   Genitourinary:  Negative for flank pain.   Musculoskeletal:  Negative for myalgias.   Skin:  Negative for color change.   Neurological:  Negative for weakness and headaches.   Hematological:  Does not bruise/bleed easily.   Psychiatric/Behavioral:  Positive for agitation and sleep disturbance. Negative for suicidal ideas. The patient is nervous/anxious.    All other systems reviewed and are negative.      Physical Exam     Initial Vitals [06/08/25 2218]   BP Pulse Resp Temp SpO2   (!) 144/72 66 (!) 22 98.1 °F (36.7 °C) 100 %      MAP       --         Physical Exam    Nursing note and vitals reviewed.  Constitutional: She appears well-developed and well-nourished.  Non-toxic appearance. She does not appear ill.   HENT:   Head: Normocephalic. Mouth/Throat: Mucous membranes are normal.   No obvious head trauma.    Eyes: EOM are normal.   Neck: Neck supple.   Cardiovascular:  Normal rate and regular rhythm.           Pulmonary/Chest: Effort normal and breath sounds  normal. No respiratory distress.   Abdominal: Abdomen is soft. Bowel sounds are normal. She exhibits no distension. There is no abdominal tenderness.   Musculoskeletal:         General: Normal range of motion.      Cervical back: Neck supple.     Neurological: She is alert.   Patient is oriented to place and self, not oriented to time; pleasant but mildly agitated and confused; suspect this is her baseline    Skin: Skin is warm and dry.   Psychiatric: She has a normal mood and affect.   Cognition at baseline.          ED Course   Procedures  Labs Reviewed   COMPREHENSIVE METABOLIC PANEL - Abnormal       Result Value    Sodium 142      Potassium 4.7      Chloride 109      CO2 21 (*)     Glucose 113 (*)     BUN 26      Creatinine 1.4      Calcium 8.9      Protein Total 8.1      Albumin 3.7      Bilirubin Total 0.1       (*)     AST 21      ALT 22      Anion Gap 12      eGFR 35 (*)    URINALYSIS, REFLEX TO URINE CULTURE - Abnormal    Color, UA Colorless (*)     Appearance, UA Clear      pH, UA 6.0      Spec Grav UA 1.010      Protein, UA Trace (*)     Glucose, UA Negative      Ketones, UA Negative      Bilirubin, UA Negative      Blood, UA Negative      Nitrites, UA Negative      Urobilinogen, UA Negative      Leukocyte Esterase, UA Negative     CBC WITH DIFFERENTIAL - Abnormal    WBC 6.86      RBC 3.72 (*)     HGB 9.3 (*)     HCT 30.7 (*)     MCV 83      MCH 25.0 (*)     MCHC 30.3 (*)     RDW 18.2 (*)     Platelet Count 270      MPV 9.5      Nucleated RBC 0      Neut % 62.5      Lymph % 23.3      Mono % 10.2      Eos % 3.1      Basophil % 0.3      Imm Grans % 0.6 (*)     Neut # 4.29      Lymph # 1.60      Mono # 0.70      Eos # 0.21      Baso # 0.02      Imm Grans # 0.04     PROTIME-INR - Normal    PT 10.5      INR 0.9     APTT - Normal    PTT 23.9     CBC W/ AUTO DIFFERENTIAL    Narrative:     The following orders were created for panel order CBC auto differential.  Procedure                                Abnormality         Status                     ---------                               -----------         ------                     CBC with Differential[[id number]]       Abnormal            Final result                 Please view results for these tests on the individual orders.   [facility] URINE HOLD          Imaging Results              CT Head Without Contrast (Final result)  Result time 06/08/25 23:54:05      Final result by Jg Franz MD (06/08/25 23:54:05)                   Impression:    IMPRESSION:  1.  No acute intracranial abnormality.  2.  Moderate chronic microvascular ischemic changes and chronic lacunar infarct in the left thalamus.    -Electronically Signed By: Jg Franz MD   -Electronically Signed On:  6/8/2025 11:54 PM      Report Ends               Narrative:    EXAM: CT HEAD WITHOUT CONTRAST    HISTORY: fall, head trauma    TECHNIQUE: Noncontrast CT the brain was performed in the axial plane from the foramen magnum to the vertex.  All CT scans are performed using dose optimization techniques as appropriate to a performed exam including automated exposure control and/or standardized protocols for targeted exams where dose is matched to indication/reason for exam/patient size.    COMPARISON: None.    FINDINGS:     Moderate generalized cerebral volume loss.    Gray-white differentiation is maintained with no CT evidence of acute infarct. Chronic lacunar infarct in the left thalamus    No intracranial hemorrhage, extra-axial fluid collection, vasogenic edema, mass effect or midline shift.     Patchy hypodensity in the periventricular and deep cerebral white matter suggestive of moderate chronic microvascular ischemic changes. Intracranial atherosclerosis.    No hydrocephalus.     The visualized paranasal sinuses and mastoid air cells are clear. Bones are unremarkable. Visualized orbits are unremarkable.                                       X-Ray Chest 1 View (Final result)  Result time  06/08/25 23:34:23      Final result by Kitty Ovalles MD (06/08/25 23:34:23)                   Impression:      No acute intrathoracic abnormality.  Mild cardiomegaly.      Electronically signed by: Kitty Ovalles  Date:    06/08/2025  Time:    23:34               Narrative:    EXAMINATION:  CHEST ONE VIEW    CLINICAL HISTORY:  Unspecified fall, initial encounter    TECHNIQUE:  One view of the chest.    COMPARISON:  02/25/2025    FINDINGS:  The cardiac silhouette is mildly enlarged.  Vascular calcification is seen at the aortic knob.  There is no focal consolidation, pneumothorax, or pleural effusion.  The bones are diffusely osteopenic.                                       Medications   QUEtiapine tablet 25 mg (25 mg Oral Given 6/8/25 9246)     Medical Decision Making  Amount and/or Complexity of Data Reviewed  Independent Historian: EMS     Details: See HPI.   External Data Reviewed: notes.     Details: See HPI.   Labs: ordered. Decision-making details documented in ED Course.  Radiology: ordered. Decision-making details documented in ED Course.  ECG/medicine tests: ordered. Decision-making details documented in ED Course.    Risk  Prescription drug management.    Vitals unremarkable   Patient is well-appearing and in no acute distress; suspect she is at her baseline mental status  CT head is unremarkable  Chest x-ray is unremarkable   Basic labs reviewed and are unremarkable  UA without evidence of infection  Given 25 of Seroquel due to mild agitation; patient is resting comfortably afterwards  Family at bedside is amenable to discharge on melatonin   They do not want the patient to be discharged with the Seroquel because they are worried the facility we will give her to her inappropriately and he would like to start with a melatonin as a sleep aid  Prescribed melatonin   Advised to follow up with the regular physician for further evaluation of her agitation, sleep difficulties  Family verbalized  understanding agreement with the plan   Patient is stable for discharge    I discussed with the patient/family the diagnosis, treatment plan, indications for return to the emergency department, and for expected follow-up. The patient/family verbalized an understanding. The patient/family is asked if there are any questions or concerns. We discuss the case, until all issues are addressed to the patient/familys satisfaction. Patient/family understands and is agreeable to the plan.   Grupo Campuzano    DISCLAIMER: This note was prepared with [email or web address] voice recognition transcription software.               Scribe Attestation:   Scribe #1: I performed the above scribed service and the documentation accurately describes the services I performed. I attest to the accuracy of the note.                             I, Grupo Campuzano MD, personally performed the services described in this documentation. All medical record entries made by the scribe were at my direction and in my presence. I have reviewed the chart and agree that the record reflects my personal performance and is accurate and complete.      DISCLAIMER: This note was prepared with [email or web address] voice recognition transcription software. Garbled syntax, mangled pronouns, and other bizarre constructions may be attributed to that software system.     Clinical Impression:  Final diagnoses:  [W19.XXXA] Fall                       [1]   Social History  Tobacco Use    Smoking status: Never    Smokeless tobacco: Never   Substance Use Topics    Alcohol use: No    Drug use: No        Grupo Campuzano MD  06/09/25 0049

## 2025-06-10 LAB
OHS QRS DURATION: 86 MS
OHS QTC CALCULATION: 448 MS